# Patient Record
Sex: FEMALE | Race: BLACK OR AFRICAN AMERICAN
[De-identification: names, ages, dates, MRNs, and addresses within clinical notes are randomized per-mention and may not be internally consistent; named-entity substitution may affect disease eponyms.]

---

## 2018-01-19 ENCOUNTER — HOSPITAL ENCOUNTER (OUTPATIENT)
Dept: HOSPITAL 17 - PHED | Age: 73
Setting detail: OBSERVATION
LOS: 3 days | Discharge: HOME HEALTH | End: 2018-01-22
Attending: HOSPITALIST | Admitting: HOSPITALIST
Payer: MEDICARE

## 2018-01-19 VITALS
HEART RATE: 106 BPM | DIASTOLIC BLOOD PRESSURE: 67 MMHG | SYSTOLIC BLOOD PRESSURE: 108 MMHG | TEMPERATURE: 99.7 F | OXYGEN SATURATION: 97 % | RESPIRATION RATE: 20 BRPM

## 2018-01-19 VITALS
HEART RATE: 92 BPM | SYSTOLIC BLOOD PRESSURE: 115 MMHG | OXYGEN SATURATION: 94 % | RESPIRATION RATE: 20 BRPM | DIASTOLIC BLOOD PRESSURE: 67 MMHG

## 2018-01-19 VITALS
SYSTOLIC BLOOD PRESSURE: 147 MMHG | DIASTOLIC BLOOD PRESSURE: 84 MMHG | TEMPERATURE: 99.1 F | RESPIRATION RATE: 16 BRPM | HEART RATE: 100 BPM | OXYGEN SATURATION: 97 %

## 2018-01-19 VITALS
SYSTOLIC BLOOD PRESSURE: 121 MMHG | HEART RATE: 98 BPM | OXYGEN SATURATION: 99 % | DIASTOLIC BLOOD PRESSURE: 78 MMHG | TEMPERATURE: 98.6 F | RESPIRATION RATE: 20 BRPM

## 2018-01-19 VITALS
SYSTOLIC BLOOD PRESSURE: 104 MMHG | DIASTOLIC BLOOD PRESSURE: 60 MMHG | RESPIRATION RATE: 20 BRPM | OXYGEN SATURATION: 98 % | HEART RATE: 109 BPM | TEMPERATURE: 97.8 F

## 2018-01-19 VITALS — OXYGEN SATURATION: 96 %

## 2018-01-19 VITALS
HEART RATE: 102 BPM | RESPIRATION RATE: 20 BRPM | SYSTOLIC BLOOD PRESSURE: 114 MMHG | TEMPERATURE: 99.8 F | OXYGEN SATURATION: 98 % | DIASTOLIC BLOOD PRESSURE: 65 MMHG

## 2018-01-19 VITALS — DIASTOLIC BLOOD PRESSURE: 58 MMHG | SYSTOLIC BLOOD PRESSURE: 106 MMHG

## 2018-01-19 VITALS — HEART RATE: 102 BPM

## 2018-01-19 VITALS — HEIGHT: 64 IN | WEIGHT: 182.1 LBS | BODY MASS INDEX: 31.09 KG/M2

## 2018-01-19 DIAGNOSIS — Z79.899: ICD-10-CM

## 2018-01-19 DIAGNOSIS — R73.9: ICD-10-CM

## 2018-01-19 DIAGNOSIS — E86.0: ICD-10-CM

## 2018-01-19 DIAGNOSIS — I10: ICD-10-CM

## 2018-01-19 DIAGNOSIS — N20.0: ICD-10-CM

## 2018-01-19 DIAGNOSIS — D63.8: ICD-10-CM

## 2018-01-19 DIAGNOSIS — G93.41: ICD-10-CM

## 2018-01-19 DIAGNOSIS — B96.4: ICD-10-CM

## 2018-01-19 DIAGNOSIS — K59.00: ICD-10-CM

## 2018-01-19 DIAGNOSIS — R94.31: ICD-10-CM

## 2018-01-19 DIAGNOSIS — E78.00: ICD-10-CM

## 2018-01-19 DIAGNOSIS — R60.9: ICD-10-CM

## 2018-01-19 DIAGNOSIS — D25.9: ICD-10-CM

## 2018-01-19 DIAGNOSIS — R11.0: ICD-10-CM

## 2018-01-19 DIAGNOSIS — N39.0: Primary | ICD-10-CM

## 2018-01-19 LAB
% SATURATION IRON PROFILE: 11.6 % (ref 20–50)
ALBUMIN SERPL-MCNC: 2.4 GM/DL (ref 3.4–5)
ALP SERPL-CCNC: 82 U/L (ref 45–117)
ALT SERPL-CCNC: 14 U/L (ref 10–53)
APAP SERPL-MCNC: (no result) MCG/ML (ref 10–30)
AST SERPL-CCNC: 24 U/L (ref 15–37)
BACTERIA #/AREA URNS HPF: (no result) /HPF
BASOPHILS # BLD AUTO: 0 TH/MM3 (ref 0–0.2)
BASOPHILS NFR BLD: 0.4 % (ref 0–2)
BILIRUB SERPL-MCNC: 0.4 MG/DL (ref 0.2–1)
BUN SERPL-MCNC: 17 MG/DL (ref 7–18)
CALCIUM SERPL-MCNC: 10.6 MG/DL (ref 8.5–10.1)
CHLORIDE SERPL-SCNC: 106 MEQ/L (ref 98–107)
COLOR UR: YELLOW
CREAT SERPL-MCNC: 0.92 MG/DL (ref 0.5–1)
EOSINOPHIL # BLD: 0.1 TH/MM3 (ref 0–0.4)
EOSINOPHIL NFR BLD: 0.8 % (ref 0–4)
ERYTHROCYTE [DISTWIDTH] IN BLOOD BY AUTOMATED COUNT: 16.2 % (ref 11.6–17.2)
FERRITIN SERPL-MCNC: 703 NG/ML (ref 8–252)
GFR SERPLBLD BASED ON 1.73 SQ M-ARVRAT: 72 ML/MIN (ref 89–?)
GLUCOSE SERPL-MCNC: 147 MG/DL (ref 74–106)
GLUCOSE UR STRIP-MCNC: (no result) MG/DL
HCO3 BLD-SCNC: 28.1 MEQ/L (ref 21–32)
HCT VFR BLD CALC: 28.6 % (ref 35–46)
HGB BLD-MCNC: 8.9 GM/DL (ref 11.6–15.3)
HGB UR QL STRIP: (no result)
IRON (FE): 23 MCG/DL (ref 50–170)
KETONES UR STRIP-MCNC: (no result) MG/DL
LIPASE: 123 U/L (ref 73–393)
LYMPHOCYTES # BLD AUTO: 1 TH/MM3 (ref 1–4.8)
LYMPHOCYTES NFR BLD AUTO: 12.2 % (ref 9–44)
MCH RBC QN AUTO: 23 PG (ref 27–34)
MCHC RBC AUTO-ENTMCNC: 31.1 % (ref 32–36)
MCV RBC AUTO: 74.2 FL (ref 80–100)
MONOCYTE #: 0.6 TH/MM3 (ref 0–0.9)
MONOCYTES NFR BLD: 8.2 % (ref 0–8)
NEUTROPHILS # BLD AUTO: 6.2 TH/MM3 (ref 1.8–7.7)
NEUTROPHILS NFR BLD AUTO: 78.4 % (ref 16–70)
NITRITE UR QL STRIP: (no result)
PLATELET # BLD: 374 TH/MM3 (ref 150–450)
PMV BLD AUTO: 7.2 FL (ref 7–11)
PROT SERPL-MCNC: 7.7 GM/DL (ref 6.4–8.2)
RBC # BLD AUTO: 3.86 MIL/MM3 (ref 4–5.3)
RBC #/AREA URNS HPF: (no result) /HPF (ref 0–3)
SODIUM SERPL-SCNC: 140 MEQ/L (ref 136–145)
SP GR UR STRIP: 1.01 (ref 1–1.03)
SQUAMOUS #/AREA URNS HPF: (no result) /HPF (ref 0–5)
TIBC SERPL-MCNC: 199 MCG/DL (ref 250–450)
TROPONIN I SERPL-MCNC: (no result) NG/ML (ref 0.02–0.05)
URINE LEUKOCYTE ESTERASE: (no result)
WBC # BLD AUTO: 7.9 TH/MM3 (ref 4–11)

## 2018-01-19 PROCEDURE — 84443 ASSAY THYROID STIM HORMONE: CPT

## 2018-01-19 PROCEDURE — 93970 EXTREMITY STUDY: CPT

## 2018-01-19 PROCEDURE — 83690 ASSAY OF LIPASE: CPT

## 2018-01-19 PROCEDURE — 83540 ASSAY OF IRON: CPT

## 2018-01-19 PROCEDURE — 83550 IRON BINDING TEST: CPT

## 2018-01-19 PROCEDURE — 51702 INSERT TEMP BLADDER CATH: CPT

## 2018-01-19 PROCEDURE — 87086 URINE CULTURE/COLONY COUNT: CPT

## 2018-01-19 PROCEDURE — 97110 THERAPEUTIC EXERCISES: CPT

## 2018-01-19 PROCEDURE — 96361 HYDRATE IV INFUSION ADD-ON: CPT

## 2018-01-19 PROCEDURE — G0378 HOSPITAL OBSERVATION PER HR: HCPCS

## 2018-01-19 PROCEDURE — 82948 REAGENT STRIP/BLOOD GLUCOSE: CPT

## 2018-01-19 PROCEDURE — 74177 CT ABD & PELVIS W/CONTRAST: CPT

## 2018-01-19 PROCEDURE — 96376 TX/PRO/DX INJ SAME DRUG ADON: CPT

## 2018-01-19 PROCEDURE — 74018 RADEX ABDOMEN 1 VIEW: CPT

## 2018-01-19 PROCEDURE — 87186 SC STD MICRODIL/AGAR DIL: CPT

## 2018-01-19 PROCEDURE — 80048 BASIC METABOLIC PNL TOTAL CA: CPT

## 2018-01-19 PROCEDURE — 84484 ASSAY OF TROPONIN QUANT: CPT

## 2018-01-19 PROCEDURE — 82550 ASSAY OF CK (CPK): CPT

## 2018-01-19 PROCEDURE — 93005 ELECTROCARDIOGRAM TRACING: CPT

## 2018-01-19 PROCEDURE — 96366 THER/PROPH/DIAG IV INF ADDON: CPT

## 2018-01-19 PROCEDURE — 71046 X-RAY EXAM CHEST 2 VIEWS: CPT

## 2018-01-19 PROCEDURE — 97166 OT EVAL MOD COMPLEX 45 MIN: CPT

## 2018-01-19 PROCEDURE — 99285 EMERGENCY DEPT VISIT HI MDM: CPT

## 2018-01-19 PROCEDURE — 97535 SELF CARE MNGMENT TRAINING: CPT

## 2018-01-19 PROCEDURE — 87077 CULTURE AEROBIC IDENTIFY: CPT

## 2018-01-19 PROCEDURE — 96365 THER/PROPH/DIAG IV INF INIT: CPT

## 2018-01-19 PROCEDURE — 80053 COMPREHEN METABOLIC PANEL: CPT

## 2018-01-19 PROCEDURE — 96367 TX/PROPH/DG ADDL SEQ IV INF: CPT

## 2018-01-19 PROCEDURE — 82552 ASSAY OF CPK IN BLOOD: CPT

## 2018-01-19 PROCEDURE — 96372 THER/PROPH/DIAG INJ SC/IM: CPT

## 2018-01-19 PROCEDURE — 97530 THERAPEUTIC ACTIVITIES: CPT

## 2018-01-19 PROCEDURE — 97162 PT EVAL MOD COMPLEX 30 MIN: CPT

## 2018-01-19 PROCEDURE — 83036 HEMOGLOBIN GLYCOSYLATED A1C: CPT

## 2018-01-19 PROCEDURE — 81001 URINALYSIS AUTO W/SCOPE: CPT

## 2018-01-19 PROCEDURE — 80307 DRUG TEST PRSMV CHEM ANLYZR: CPT

## 2018-01-19 PROCEDURE — 82728 ASSAY OF FERRITIN: CPT

## 2018-01-19 PROCEDURE — 85025 COMPLETE CBC W/AUTO DIFF WBC: CPT

## 2018-01-19 PROCEDURE — 70450 CT HEAD/BRAIN W/O DYE: CPT

## 2018-01-19 RX ADMIN — HEPARIN SODIUM SCH UNITS: 10000 INJECTION, SOLUTION INTRAVENOUS; SUBCUTANEOUS at 20:59

## 2018-01-19 RX ADMIN — INSULIN ASPART SCH: 100 INJECTION, SOLUTION INTRAVENOUS; SUBCUTANEOUS at 21:00

## 2018-01-19 RX ADMIN — INSULIN ASPART SCH: 100 INJECTION, SOLUTION INTRAVENOUS; SUBCUTANEOUS at 17:00

## 2018-01-19 RX ADMIN — PHENYTOIN SODIUM SCH MLS/HR: 50 INJECTION INTRAMUSCULAR; INTRAVENOUS at 04:51

## 2018-01-19 RX ADMIN — PHENYTOIN SODIUM SCH MLS/HR: 50 INJECTION INTRAMUSCULAR; INTRAVENOUS at 05:15

## 2018-01-19 RX ADMIN — Medication SCH ML: at 20:59

## 2018-01-19 RX ADMIN — HEPARIN SODIUM SCH UNITS: 10000 INJECTION, SOLUTION INTRAVENOUS; SUBCUTANEOUS at 14:00

## 2018-01-19 RX ADMIN — STANDARDIZED SENNA CONCENTRATE AND DOCUSATE SODIUM SCH TAB: 8.6; 5 TABLET, FILM COATED ORAL at 20:59

## 2018-01-19 RX ADMIN — Medication SCH ML: at 09:00

## 2018-01-19 RX ADMIN — INSULIN ASPART SCH: 100 INJECTION, SOLUTION INTRAVENOUS; SUBCUTANEOUS at 12:00

## 2018-01-19 NOTE — RADRPT
EXAM DATE/TIME:  01/19/2018 04:09 

 

[facility] COMPARISON:     

No previous studies available for comparison.

 

 

INDICATIONS :     

Altered mental status and generalized weakness

                      

 

RADIATION DOSE:     

57.79 CTDIvol (mGy) 

 

 

 

MEDICAL HISTORY :     

Hypertension.  

 

SURGICAL HISTORY :      

None. 

 

ENCOUNTER:      

Initial

 

ACUITY:      

1 day

 

PAIN SCALE:      

0/10

 

LOCATION:        

cranial 

 

TECHNIQUE:     

Multiple contiguous axial images were obtained of the head.  Using automated exposure control and adj
ustment of the mA and/or kV according to patient size, radiation dose was kept as low as reasonably a
chievable to obtain optimal diagnostic quality images.   DICOM format image data is available electro
nically for review and comparison.  

 

FINDINGS:     

 

CEREBRUM:     

Mild to moderate diffuse volume loss. The ventricles are normal for age.  No evidence of midline shif
t, mass lesion, hemorrhage or acute infarction.  No extra-axial fluid collections are seen.

 

POSTERIOR FOSSA:     

The cerebellum and brainstem are intact.  The 4th ventricle is midline.  The cerebellopontine angle i
s unremarkable.

 

EXTRACRANIAL:     

The visualized portion of the orbits is intact.

 

SKULL:     

The calvaria is intact.  No evidence of skull fracture.

 

CONCLUSION:     

1. Senescent changes without acute intracranial abnormality.

 

 

 

 Mario Del Rosario MD on January 19, 2018 at 4:28           

Board Certified Radiologist.

 This report was verified electronically.

## 2018-01-19 NOTE — PD
HPI


Chief Complaint:  General Weakness


Time Seen by Provider:  03:17


Travel History


International Travel<30 days:  No


Contact w/Intl Traveler<30days:  No


Traveled to known affect area:  No





History of Present Illness


HPI


 The patient is a 72-year-old female that has a history of urinary tract 

infections, specifically pyelonephritis, who complains of dysuria frequency and 

urgency.  She is a poor historian and said she has had this problem since age 

3.  She also states she has flank pain and then she denies flank pain.  She 

does not know if she has had a fever or not.





PFSH


Past Medical History


High Cholesterol:  Yes


Diminished Hearing:  No


Hypertension:  Yes


Menopausal:  Yes


Tubal Ligation:  Yes





Social History


Alcohol Use:  No


Tobacco Use:  No


Substance Use:  No





Allergies-Medications


(Allergen,Severity, Reaction):  


Coded Allergies:  


     No Known Allergies (Unverified  Adverse Reaction, Unknown, 1/19/18)


Reported Meds & Prescriptions





Reported Meds & Active Scripts


Active


Reported


Lisinopril 20 Mg Tab 20 Mg PO DAILY


Norco (Hydrocodone-Acetaminophen) 5 Mg-325 Mg Tab 1 Tab PO Q4H PRN


Atorvastatin (Atorvastatin Calcium) 20 Mg Tab 20 Mg PO HS


Amlodipine (Amlodipine Besylate) 5 Mg Tab 5 Mg PO DAILY








Review of Systems


ROS Limitations:  Poor Historian





Physical Exam


Narrative


GENERAL: Well-nourished, well-developed patient.The patient is somewhat 

lethargic and answers questions slowly.  She does not know what state she is in 

but says she is in [place].  She does know the president.  It took her a long 

time to think of the year but she ultimately got that right.  Her vital signs 

show pulse of 100, blood pressure 147/84 but otherwise normal.  The patient 

appears moderately dehydrated.  She is incontinent of urine.  SHe smells of 

urine.


SKIN: Focused skin assessment warm/dry.  No skin rash is seen.  The patient 

exhibits suboptimal hygiene.


HEAD: Normocephalic.


EYES: No scleral icterus. No injection or drainage. 


NECK: Supple, trachea midline. No JVD or lymphadenopathy.


CARDIOVASCULAR: Regular rate and rhythm without murmurs, gallops, or rubs. 


RESPIRATORY: Breath sounds equal bilaterally. No accessory muscle use.


GASTROINTESTINAL: Abdomen soft, non-tender, nondistended.  No guarding or 

rebound is present.


MUSCULOSKELETAL: No cyanosis, or edema. 


BACK: Nontender without obvious deformity. No CVA tenderness. 


ENT: The tongue appears somewhat dry, tympanic membranes are clear.  The throat 

is clear.


RECTAL EXAM: No masses or tenderness, stool is brown and guaiac-negative.





Data


Data


Last Documented VS





Vital Signs








  Date Time  Temp Pulse Resp B/P (MAP) Pulse Ox O2 Delivery O2 Flow Rate FiO2


 


1/19/18 03:38  87 20  97   


 


1/19/18 02:55      Room Air  


 


1/19/18 02:54 99.1   147/84 (105)    








Orders





 Orders


Complete Blood Count With Diff (1/19/18 02:53)


Comprehensive Metabolic Panel (1/19/18 02:53)


Urinalysis - C+S If Indicated (1/19/18 02:53)


Iv Access Insert/Monitor (1/19/18 02:53)


Oxygen Administration (1/19/18 02:53)


Oximetry (1/19/18 02:53)


Urine Culture (1/19/18 02:55)


Lipase (1/19/18 02:53)


Thyroid Stimulating Hormone (1/19/18 02:53)


Electrocardiogram (1/19/18 03:53)


Chest, Pa & Lat (1/19/18 03:53)


Ct Brain W/O Iv Contrast(Rout) (1/19/18 03:53)


Urinary Catheter Insert/Apply (1/19/18 03:53)


Drug Screen, Random Urine (1/19/18 03:53)


Tylenol (Acetaminophen) (1/19/18 03:20)


Alcohol (Ethanol) (1/19/18 03:20)


Creatine Kinase (Cpk) (1/19/18 03:20)


Troponin I (1/19/18 03:20)


CKMB (1/19/18 03:20)


CKMB% (1/19/18 03:20)


Levofloxacin 750 Mg Premix Inj (Levaquin (1/19/18 04:30)


Sodium Chlor 0.9% 1000 Ml Inj (Ns 1000 M (1/19/18 04:45)





Labs





Laboratory Tests








Test


  1/19/18


02:55 1/19/18


03:20


 


Urine Color YELLOW  


 


Urine Turbidity CLOUDY  


 


Urine pH 8.5  


 


Urine Specific Gravity 1.013  


 


Urine Protein 100 mg/dL  


 


Urine Glucose (UA) NEG mg/dL  


 


Urine Ketones NEG mg/dL  


 


Urine Occult Blood MOD  


 


Urine Nitrite NEG  


 


Urine Bilirubin NEG  


 


Urine Leukocyte Esterase LARGE  


 


Urine RBC 4-9 /hpf  


 


Urine WBC 20-24 /hpf  


 


Urine Squamous Epithelial


Cells 0-5 /hpf 


  


 


 


Urine Bacteria MANY /hpf  


 


Microscopic Urinalysis Comment


  CATH-CULTURE


IND 


 


 


Urine Opiates Screen NEG  


 


Urine Barbiturates Screen NEG  


 


Urine Amphetamines Screen NEG  


 


Urine Benzodiazepines Screen NEG  


 


Urine Cocaine Screen NEG  


 


Urine Cannabinoids Screen NEG  


 


White Blood Count  7.9 TH/MM3 


 


Red Blood Count  3.86 MIL/MM3 


 


Hemoglobin  8.9 GM/DL 


 


Hematocrit  28.6 % 


 


Mean Corpuscular Volume  74.2 FL 


 


Mean Corpuscular Hemoglobin  23.0 PG 


 


Mean Corpuscular Hemoglobin


Concent 


  31.1 % 


 


 


Red Cell Distribution Width  16.2 % 


 


Platelet Count  374 TH/MM3 


 


Mean Platelet Volume  7.2 FL 


 


Neutrophils (%) (Auto)  78.4 % 


 


Lymphocytes (%) (Auto)  12.2 % 


 


Monocytes (%) (Auto)  8.2 % 


 


Eosinophils (%) (Auto)  0.8 % 


 


Basophils (%) (Auto)  0.4 % 


 


Neutrophils # (Auto)  6.2 TH/MM3 


 


Lymphocytes # (Auto)  1.0 TH/MM3 


 


Monocytes # (Auto)  0.6 TH/MM3 


 


Eosinophils # (Auto)  0.1 TH/MM3 


 


Basophils # (Auto)  0.0 TH/MM3 


 


CBC Comment  AUTO DIFF 


 


Differential Comment


  


  AUTO DIFF


CONFIRMED


 


Platelet Estimate  NORMAL 


 


Platelet Morphology Comment  NORMAL 


 


Blood Urea Nitrogen  17 MG/DL 


 


Creatinine  0.92 MG/DL 


 


Random Glucose  147 MG/DL 


 


Total Protein  7.7 GM/DL 


 


Albumin  2.4 GM/DL 


 


Calcium Level  10.6 MG/DL 


 


Alkaline Phosphatase  82 U/L 


 


Aspartate Amino Transf


(AST/SGOT) 


  24 U/L 


 


 


Alanine Aminotransferase


(ALT/SGPT) 


  14 U/L 


 


 


Total Bilirubin  0.4 MG/DL 


 


Sodium Level  140 MEQ/L 


 


Potassium Level  3.8 MEQ/L 


 


Chloride Level  106 MEQ/L 


 


Carbon Dioxide Level  28.1 MEQ/L 


 


Anion Gap  6 MEQ/L 


 


Estimat Glomerular Filtration


Rate 


  72 ML/MIN 


 


 


Total Creatine Kinase  470 U/L 


 


Creatine Kinase MB  2.9 NG/ML 


 


Creatine Kinase MB %  0.6 % 


 


Troponin I


  


  LESS THAN 0.02


NG/ML


 


Lipase  123 U/L 


 


Thyroid Stimulating Hormone


3rd Gen 


  0.964 uIU/ML 


 


 


Ethyl Alcohol Level


  


  LESS THAN 3


MG/DL











MDM


Medical Decision Making


Medical Screen Exam Complete:  Yes


Emergency Medical Condition:  Yes


Medical Record Reviewed:  Yes


Interpretation(s)


EKG shows sinus rhythm with a rate of 90 and no acute ST elevation or 

depression.  The CT scanning of the brain shows senescent changes without acute 

intercranial abnormality.  The complete metabolic profile shows a glucose of 147

, it GFR of 72, calcium 10.6 and albumin of 2.4 but is otherwise unremarkable.  

The TSH is normal and the troponin I is normal.


Differential Diagnosis


Urinary tract infection-pyelonephritis versus cystitis, nephrolithiasis, 

electrolyte disorder, cholecystitis, appendicitis, colitis, acute coronary 

syndrome, dehydration, altered mental status, anemia, electrolyte disorder, hypo

-/hyperglycemia


Narrative Course


The patient has altered mental status.  This may be due to your her urinary 

tract infection.  She also has mild dehydration and anemia.  A rectal exam was 

done which showed no blood in the stool however.  The patient is too weak and 

somewhat confused and will be admitted to Dr. Steven vivas.





Sepsis Criteria


SIRS Criteria (2 or more):  Heart rate over 90


Sepsis Criteria (SIRS+source):  Infect source susp/known





Diagnosis





 Primary Impression:  


 Altered mental status


 Additional Impressions:  


 Urinary tract infection


 Mild dehydration


 Anemia





Admitting Information


Admitting Physician Requests:  Admit











Nir Hudson MD Jan 19, 2018 03:31

## 2018-01-19 NOTE — RADRPT
EXAM DATE/TIME:  01/19/2018 16:36 

 

[facility] COMPARISON:     

CHEST PA & LAT, January 19, 2018, 4:17.

 

                     

INDICATIONS :     

Abdominal distention. 

                     

 

MEDICAL HISTORY :            

Hypercholesterolemia. Hypertension. Pylonephritis. Syncope. Chronic UTIs, Back pain   

 

SURGICAL HISTORY :     

Tubal ligation.   

 

ENCOUNTER:     

Initial                                        

 

ACUITY:     

1 day      

 

PAIN SCORE:     

2/10

 

LOCATION:     

Bilateral  abdomen

 

FINDINGS:     

The examination demonstrates a staghorn calculus filling the collecting system of the right kidney.

 

There is some residual oral contrast within small bowel versus a heterogeneously calcified mass in th
e lower and mid abdomen. If the patient has not had prior oral contrast CT imaging through the abdome
n is warranted for further assessment.

 

There is mild gaseous distention of the stomach. There is gas and stool down to the rectum. No findin
gs to indicate bowel obstruction are seen.

 

CONCLUSION:     

1. Staghorn calculus filling the right kidney.

2. Possible calcified mass in the midabdomen and pelvis please see above discussion. 

 

 

 Antonio Santos MD on January 19, 2018 at 17:00           

Board Certified Radiologist.

 This report was verified electronically.

## 2018-01-19 NOTE — HHI.HP
__________________________________________________





HPI


Service


[facility]


Primary Care Physician


Juancho Rosales, DO


Admission Diagnosis





altered mental status, urinary tract infection, mild dehydration, an


Diagnoses:  


Chief Complaint:  


altered Mental status


Travel History


International Travel<30 Days:  No


Contact w/Intl Traveler <30 Da:  No


Traveled to Known Affected Are:  No


History of Present Illness


This is a 71-year-old female with past medical history of hypertension, 

hyperlipidemia, previous pyelonephritis who was brought in because of complaint 

of dysuria and urgency.  The patient is a very poor historian but she states 

she has had this problem since age 3.  The patient denies any chest pain, 

shortness of breath, states she has not had fevers but felt warm at home but 

denies chills.  As per ED medical records the patient was lethargic when 

examined initially.  The patient also complains of bilateral lower extremity 

swelling however she states that her right lower extremity is more swollen than 

the left.  She has a state that she has been in bed for the past 2 days with 

generalized weakness unable to ambulate.





Review of Systems


As per history of present illness, other systems reviewed and negative.





Past Family Social History


Past Medical History


Hypertension, hyperlipidemia, pyelonephritis.


Past Surgical History


Tubal ligation


Reported Medications





Reported Meds & Active Scripts


Active


Reported


Lisinopril 20 Mg Tab 20 Mg PO DAILY


Norco (Hydrocodone-Acetaminophen) 5 Mg-325 Mg Tab 1 Tab PO Q4H PRN


Atorvastatin (Atorvastatin Calcium) 20 Mg Tab 20 Mg PO HS


Amlodipine (Amlodipine Besylate) 5 Mg Tab 5 Mg PO DAILY


Allergies:  


Coded Allergies:  


     No Known Allergies (Unverified  Allergy, Unknown, 18)


Active Ordered Medications





Current Medications








 Medications


  (Trade)  Dose


 Ordered  Sig/[place]


 Route  Start Time


 Stop Time Status Last Admin


 


  (NS Flush)  2 ml  UNSCH  PRN


 IV FLUSH  18 05:15


     


 


 


  (NS Flush)  2 ml  BID


 IV FLUSH  18 09:00


     


 


 


  (Narcan Inj)  0.4 mg  UNSCH  PRN


 IV PUSH  18 05:15


     


 


 


 Ceftriaxone


 Sodium 1000 mg/


 Sodium Chloride  100 ml @ 


 200 mls/hr  Q24H


 IV  18 09:00


    18 09:32


 








Family History


Unable to obtain due to patient's mental status.


Social History


Patient denies smoking, drinking alcohol, use of illicit drugs.





Physical Exam


Vital Signs





Vital Signs








  Date Time  Temp Pulse Resp B/P (MAP) Pulse Ox O2 Delivery O2 Flow Rate FiO2


 


18 08:00 98.6 98 20 121/78 (92) 99   


 


18 06:40  86 20 106/58 (74) 96   


 


18 03:57  92 20 115/67 (83) 94 Nasal Cannula 2.00 


 


18 03:38  87 20  97   


 


18 02:55     96 Room Air  


 


18 02:55     96 Room Air  


 


18 02:54 99.1 100 16 147/84 (105) 97   








Physical Exam


GENERAL: This is a well-nourished, well-developed patient, in no apparent 

distress.


SKIN: No rashes, ecchymoses or lesions. Cool and dry.


HEAD: Atraumatic. Normocephalic. No temporal or scalp tenderness.


EYES: Pupils equal round and reactive. Extraocular motions intact. No scleral 

icterus. No injection or drainage. 


ENT: Nose without bleeding, purulent drainage or septal hematoma. Throat 

without erythema, tonsillar hypertrophy or exudate. Uvula midline. Airway 

patent.


NECK: Trachea midline. No JVD or lymphadenopathy. Supple, nontender, no 

meningeal signs.


CARDIOVASCULAR: Regular rate and rhythm without murmurs, gallops, or rubs. 


RESPIRATORY: Clear to auscultation. Breath sounds equal bilaterally. No wheezes

, rales, or rhonchi.  


GASTROINTESTINAL: Abdomen soft, tender to palpation of suprapubic region, 

nondistended. No hepato-splenomegaly, or palpable masses. No guarding.  Right 

CVA tenderness present.


MUSCULOSKELETAL: Extremities without clubbing, cyanosis, or edema. No joint 

tenderness, effusion, or edema noted. No calf tenderness. Negative Homans sign 

bilaterally.


NEUROLOGICAL: Awake and alert oriented 3, however slow answering questions. 

Cranial nerves II through XII intact.  Motor and sensory grossly within normal 

limits.  3 out of 5 muscle strength in all muscle groups.  Normal speech.


Laboratory





Laboratory Tests








Test


  18


02:55 18


03:20


 


Urine Color YELLOW  


 


Urine Turbidity CLOUDY  


 


Urine pH 8.5  


 


Urine Specific Gravity 1.013  


 


Urine Protein 100  


 


Urine Glucose (UA) NEG  


 


Urine Ketones NEG  


 


Urine Occult Blood MOD  


 


Urine Nitrite NEG  


 


Urine Bilirubin NEG  


 


Urine Leukocyte Esterase LARGE  


 


Urine RBC 4-9  


 


Urine WBC 20-24  


 


Urine Squamous Epithelial


Cells 0-5 


  


 


 


Urine Bacteria MANY  


 


Microscopic Urinalysis Comment


  CATH-CULTURE


IND 


 


 


Urine Opiates Screen NEG  


 


Urine Barbiturates Screen NEG  


 


Urine Amphetamines Screen NEG  


 


Urine Benzodiazepines Screen NEG  


 


Urine Cocaine Screen NEG  


 


Urine Cannabinoids Screen NEG  


 


White Blood Count  7.9 


 


Red Blood Count  3.86 


 


Hemoglobin  8.9 


 


Hematocrit  28.6 


 


Mean Corpuscular Volume  74.2 


 


Mean Corpuscular Hemoglobin  23.0 


 


Mean Corpuscular Hemoglobin


Concent 


  31.1 


 


 


Red Cell Distribution Width  16.2 


 


Platelet Count  374 


 


Mean Platelet Volume  7.2 


 


Neutrophils (%) (Auto)  78.4 


 


Lymphocytes (%) (Auto)  12.2 


 


Monocytes (%) (Auto)  8.2 


 


Eosinophils (%) (Auto)  0.8 


 


Basophils (%) (Auto)  0.4 


 


Neutrophils # (Auto)  6.2 


 


Lymphocytes # (Auto)  1.0 


 


Monocytes # (Auto)  0.6 


 


Eosinophils # (Auto)  0.1 


 


Basophils # (Auto)  0.0 


 


CBC Comment  AUTO DIFF 


 


Differential Comment


  


  AUTO DIFF


CONFIRMED


 


Platelet Estimate  NORMAL 


 


Platelet Morphology Comment  NORMAL 


 


Blood Urea Nitrogen  17 


 


Creatinine  0.92 


 


Random Glucose  147 


 


Total Protein  7.7 


 


Albumin  2.4 


 


Calcium Level  10.6 


 


Alkaline Phosphatase  82 


 


Aspartate Amino Transf


(AST/SGOT) 


  24 


 


 


Alanine Aminotransferase


(ALT/SGPT) 


  14 


 


 


Total Bilirubin  0.4 


 


Sodium Level  140 


 


Potassium Level  3.8 


 


Chloride Level  106 


 


Carbon Dioxide Level  28.1 


 


Anion Gap  6 


 


Estimat Glomerular Filtration


Rate 


  72 


 


 


Total Creatine Kinase  470 


 


Creatine Kinase MB  2.9 


 


Creatine Kinase MB %  0.6 


 


Troponin I  LESS THAN 0.02 


 


Lipase  123 


 


Thyroid Stimulating Hormone


3rd Gen 


  0.964 


 


 


Acetaminophen Level  LESS THAN 2.0 


 


Ethyl Alcohol Level  LESS THAN 3 














 Date/Time


Source Procedure


Growth Status


 


 


 18 02:55


Urine Catheterized Urine Urine Culture


Pending Received








Result Diagram:  


180                                                                   

             18





Imaging





Last Impressions








Head CT 18 Signed





Impressions: 





 Service Date/Time:  2018 04:09 - CONCLUSION:  1. Senescent 





 changes without acute intracranial abnormality.     Mario Del Rosario MD 


 


Chest X-Ray 18 Signed





Impressions: 





 Service Date/Time:  2018 04:17 - CONCLUSION:  1. No acute 





 cardiopulmonary disease.     Mario Del Rosario MD 








Reviewed by me Caprini VTE Risk Assessment


Caprini VTE Risk Assessment:  Mod/High Risk (score >= 2)


Caprini Risk Assessment Model











 Point Value = 1          Point Value = 2  Point Value = 3  Point Value = 5


 


Age 41-60


Minor surgery


BMI > 25 kg/m2


Swollen legs


Varicose veins


Pregnancy or postpartum


History of unexplained or recurrent


   spontaneous 


Oral contraceptives or hormone


   replacement


Sepsis (< 1 month)


Serious lung disease, including


   pneumonia (< 1 month)


Abnormal pulmonary function


Acute myocardial infarction


Congestive heart failure (< 1 month)


History of inflammatory bowel disease


Medical patient at bed rest Age 61-74


Arthroscopic surgery


Major open surgery (> 45 min)


Laparoscopic surgery (> 45 min)


Malignancy


Confined to bed (> 72 hours)


Immobilizing plaster cast


Central venous access Age >= 75


History of VTE


Family history of VTE


Factor V Leiden


Prothrombin 12259T


Lupus anticoagulant


Anticardiolipin antibodies


Elevated serum homocysteine


Heparin-induced thrombocytopenia


Other congenital or acquired


   thrombophilia Stroke (< 1 month)


Elective arthroplasty


Hip, pelvis, or leg fracture


Acute spinal cord injury (< 1 month)








Prophylaxis Regimen











   Total Risk


Factor Score Risk Level Prophylaxis Regimen


 


0-1      Low Early ambulation


 


2 Moderate Order ONE of the following:


*Sequential Compression Device (SCD)


*Heparin 5000 units SQ BID


 


3-4 Higher Order ONE of the following medications:


*Heparin 5000 units SQ TID


*Enoxaparin/Lovenox 40 mg SQ daily (WT < 150 kg, CrCl > 30 mL/min)


*Enoxaparin/Lovenox 30 mg SQ daily (WT < 150 kg, CrCl > 10-29 mL/min)


*Enoxaparin/Lovenox 30 mg SQ BID (WT < 150 kg, CrCl > 30 mL/min)


AND/OR


*Sequential Compression Device (SCD)


 


5 or more Highest Order ONE of the following medications:


*Heparin 5000 units SQ TID (Preferred with Epidurals)


*Enoxaparin/Lovenox 40 mg SQ daily (WT < 150 kg, CrCl > 30 mL/min)


*Enoxaparin/Lovenox 30 mg SQ daily (WT < 150 kg, CrCl > 10-29 mL/min)


*Enoxaparin/Lovenox 30 mg SQ BID (WT < 150 kg, CrCl > 30 mL/min)


AND


*Sequential Compression Device (SCD)











Assessment and Plan


Problem List:  


(1) Encephalopathy acute


ICD Code:  G93.40 - Encephalopathy, unspecified


Plan:  Patient presented lethargic and with altered mental status.


Change in mental status likely secondary to metabolic encephalopathy secondary 

to urinary tract infection.


Urine analysis positive, urine culture pending.


Start IV Rocephin in the emergency department.  I will continue.





(2) Urinary tract infection


ICD Code:  N39.0 - Urinary tract infection, site not specified


Status:  Acute


Plan:  Patient has had.  His episodes of a urinary tract infection.


Continue Rocephin.





(3) Anemia


ICD Code:  D64.9 - Anemia, unspecified


Status:  Acute


Plan:  Check iron studies


Monitor hemoglobin


Check stool guaiac.





(4) Hyperglycemia


ICD Code:  R73.9 - Hyperglycemia, unspecified


Plan:  No reported history of diabetes mellitus.


At the secondary to stress and infection.


Place on SSI with insulin NovoLog and check hemoglobin A1c.





(5) Edema


ICD Code:  R60.9 - Edema, unspecified


Plan:  Edema bilateral lower extremities.


Check venous Doppler to rule out DVT since patient has been in bed for the past 

couple days.





Assessment and Plan


DVT prophylaxis: SCDs, heparin subcutaneously.


Discussed Condition With


Patient, RN.





Problem Qualifiers





(1) Anemia:  


Qualified Codes:  D64.9 - Anemia, unspecified








Norberto Maza MD 2018 10:22

## 2018-01-19 NOTE — EKG
Date Performed: 01/19/2018       Time Performed: 03:59:09

 

PTAGE:      72 years

 

EKG:      Sinus rhythm 

 

. Lead V3 is not available. NONSPECIFIC T-WAVE ABNORMALITY BORDERLINE ECG WARNING: DATA QUALITY MAY A
FFECT INTERPRETATION 

 

NO PREVIOUS TRACING            

 

DOCTOR:   Mike Pike  Interpretating Date/Time  01/19/2018 14:00:16

## 2018-01-19 NOTE — RADRPT
EXAM DATE/TIME:  01/19/2018 11:52 

 

[facility] COMPARISON:     

No previous studies available for comparison.

        

 

 

INDICATIONS :                

Bilateral leg swelling.

            

 

MEDICAL HISTORY :     

Hypercholesterolemia. Hypertension.  Pylonephritis. Syncope. Chronic UTIs. Back pain.

 

SURGICAL HISTORY :     

Tubal ligation. 

 

ENCOUNTER:     

Initial

 

ACUITY:     

2 day

 

PAIN SCORE:      

2/10

 

LOCATION:      

Bilateral  legs.

                       

 

TECHNIQUE:     

Venous ultrasound of the left and right leg was performed from the inguinal ligament to the proximal 
calf.  Real-time, color Doppler and spectral tracing, compression and augmentation techniques were us
ed.  

 

FINDINGS:     

 

RIGHT LEG:     

There is normal compressibility of the deep venous system from the inguinal region to the proximal ca
lf.  No echogenic clot is seen in the lumen of the common femoral, femoral, popliteal, and posterior 
tibial veins.  There is a normal response of the venous system to proximal and distal augmentation an
d respiration.  

 

LEFT LEG:     

There is normal compressibility of the deep venous system from the inguinal region to the proximal ca
lf.  No echogenic clot is seen in the lumen of the common femoral, femoral, popliteal, and posterior 
tibial veins.  There is a normal response of the venous system to proximal and distal augmentation an
d respiration.  

 

CONCLUSION:     

Negative exam with no evidence of deep venous thrombosis.

 

 

 

 Kamar Zimmeramn MD on January 19, 2018 at 12:49           

Board Certified Radiologist.

 This report was verified electronically.

## 2018-01-19 NOTE — RADRPT
EXAM DATE/TIME:  01/19/2018 04:17 

 

[facility] COMPARISON:     

No previous studies available for comparison.

 

                     

INDICATIONS :     

Syncopal episode today

                     

 

MEDICAL HISTORY :     

Hypertension.          

 

SURGICAL HISTORY :     

None.   

 

ENCOUNTER:     

Initial                                        

 

ACUITY:     

1 day      

 

PAIN SCORE:     

Non-responsive.

 

LOCATION:     

Bilateral chest 

 

FINDINGS:     

PA and wheelchair lateral views of the chest demonstrate the lungs to be symmetrically aerated withou
t evidence of mass, infiltrate or effusion.  The cardiomediastinal contours are unremarkable.  Osseou
s structures are intact.

 

CONCLUSION:     

1. No acute cardiopulmonary disease.

 

 

 

 Mario Del Rosario MD on January 19, 2018 at 5:09           

Board Certified Radiologist.

 This report was verified electronically.

## 2018-01-20 VITALS
HEART RATE: 99 BPM | SYSTOLIC BLOOD PRESSURE: 115 MMHG | DIASTOLIC BLOOD PRESSURE: 70 MMHG | TEMPERATURE: 98.6 F | OXYGEN SATURATION: 96 % | RESPIRATION RATE: 20 BRPM

## 2018-01-20 VITALS
TEMPERATURE: 99 F | SYSTOLIC BLOOD PRESSURE: 130 MMHG | DIASTOLIC BLOOD PRESSURE: 76 MMHG | RESPIRATION RATE: 18 BRPM | HEART RATE: 96 BPM | OXYGEN SATURATION: 100 %

## 2018-01-20 VITALS
TEMPERATURE: 99 F | SYSTOLIC BLOOD PRESSURE: 115 MMHG | OXYGEN SATURATION: 98 % | RESPIRATION RATE: 20 BRPM | HEART RATE: 94 BPM | DIASTOLIC BLOOD PRESSURE: 62 MMHG

## 2018-01-20 VITALS
DIASTOLIC BLOOD PRESSURE: 72 MMHG | HEART RATE: 88 BPM | RESPIRATION RATE: 20 BRPM | TEMPERATURE: 99.2 F | OXYGEN SATURATION: 97 % | SYSTOLIC BLOOD PRESSURE: 106 MMHG

## 2018-01-20 VITALS
RESPIRATION RATE: 20 BRPM | TEMPERATURE: 99.5 F | SYSTOLIC BLOOD PRESSURE: 124 MMHG | DIASTOLIC BLOOD PRESSURE: 63 MMHG | OXYGEN SATURATION: 97 % | HEART RATE: 90 BPM

## 2018-01-20 VITALS
OXYGEN SATURATION: 98 % | SYSTOLIC BLOOD PRESSURE: 102 MMHG | HEART RATE: 91 BPM | RESPIRATION RATE: 20 BRPM | TEMPERATURE: 97.4 F | DIASTOLIC BLOOD PRESSURE: 58 MMHG

## 2018-01-20 LAB
BASOPHILS # BLD AUTO: 0 TH/MM3 (ref 0–0.2)
BASOPHILS NFR BLD: 0.1 % (ref 0–2)
BUN SERPL-MCNC: 15 MG/DL (ref 7–18)
CALCIUM SERPL-MCNC: 9.9 MG/DL (ref 8.5–10.1)
CHLORIDE SERPL-SCNC: 107 MEQ/L (ref 98–107)
CREAT SERPL-MCNC: 0.93 MG/DL (ref 0.5–1)
EOSINOPHIL # BLD: 0.1 TH/MM3 (ref 0–0.4)
EOSINOPHIL NFR BLD: 2.3 % (ref 0–4)
ERYTHROCYTE [DISTWIDTH] IN BLOOD BY AUTOMATED COUNT: 16.7 % (ref 11.6–17.2)
GFR SERPLBLD BASED ON 1.73 SQ M-ARVRAT: 72 ML/MIN (ref 89–?)
GLUCOSE SERPL-MCNC: 137 MG/DL (ref 74–106)
HCO3 BLD-SCNC: 26.8 MEQ/L (ref 21–32)
HCT VFR BLD CALC: 27 % (ref 35–46)
HGB BLD-MCNC: 8.2 GM/DL (ref 11.6–15.3)
LYMPHOCYTES # BLD AUTO: 1.5 TH/MM3 (ref 1–4.8)
LYMPHOCYTES NFR BLD AUTO: 23.3 % (ref 9–44)
MCH RBC QN AUTO: 22.9 PG (ref 27–34)
MCHC RBC AUTO-ENTMCNC: 30.5 % (ref 32–36)
MCV RBC AUTO: 75.1 FL (ref 80–100)
MONOCYTE #: 0.6 TH/MM3 (ref 0–0.9)
MONOCYTES NFR BLD: 9.6 % (ref 0–8)
NEUTROPHILS # BLD AUTO: 4.1 TH/MM3 (ref 1.8–7.7)
NEUTROPHILS NFR BLD AUTO: 64.7 % (ref 16–70)
PLATELET # BLD: 339 TH/MM3 (ref 150–450)
PMV BLD AUTO: 7.6 FL (ref 7–11)
RBC # BLD AUTO: 3.6 MIL/MM3 (ref 4–5.3)
SODIUM SERPL-SCNC: 141 MEQ/L (ref 136–145)
WBC # BLD AUTO: 6.3 TH/MM3 (ref 4–11)

## 2018-01-20 RX ADMIN — INSULIN ASPART SCH: 100 INJECTION, SOLUTION INTRAVENOUS; SUBCUTANEOUS at 20:45

## 2018-01-20 RX ADMIN — Medication SCH ML: at 20:51

## 2018-01-20 RX ADMIN — HYDROCODONE BITARTRATE AND ACETAMINOPHEN PRN TAB: 5; 325 TABLET ORAL at 20:50

## 2018-01-20 RX ADMIN — INSULIN ASPART SCH: 100 INJECTION, SOLUTION INTRAVENOUS; SUBCUTANEOUS at 12:08

## 2018-01-20 RX ADMIN — HYDROCODONE BITARTRATE AND ACETAMINOPHEN PRN TAB: 5; 325 TABLET ORAL at 13:11

## 2018-01-20 RX ADMIN — INSULIN ASPART SCH: 100 INJECTION, SOLUTION INTRAVENOUS; SUBCUTANEOUS at 08:48

## 2018-01-20 RX ADMIN — STANDARDIZED SENNA CONCENTRATE AND DOCUSATE SODIUM SCH TAB: 8.6; 5 TABLET, FILM COATED ORAL at 20:48

## 2018-01-20 RX ADMIN — Medication SCH ML: at 07:40

## 2018-01-20 RX ADMIN — HEPARIN SODIUM SCH UNITS: 10000 INJECTION, SOLUTION INTRAVENOUS; SUBCUTANEOUS at 13:08

## 2018-01-20 RX ADMIN — HEPARIN SODIUM SCH UNITS: 10000 INJECTION, SOLUTION INTRAVENOUS; SUBCUTANEOUS at 04:53

## 2018-01-20 RX ADMIN — INSULIN ASPART SCH: 100 INJECTION, SOLUTION INTRAVENOUS; SUBCUTANEOUS at 17:25

## 2018-01-20 RX ADMIN — STANDARDIZED SENNA CONCENTRATE AND DOCUSATE SODIUM SCH TAB: 8.6; 5 TABLET, FILM COATED ORAL at 07:39

## 2018-01-20 RX ADMIN — HEPARIN SODIUM SCH UNITS: 10000 INJECTION, SOLUTION INTRAVENOUS; SUBCUTANEOUS at 20:48

## 2018-01-20 NOTE — RADRPT
EXAM DATE/TIME:  01/20/2018 15:46 

 

[facility] COMPARISON:     

No previous studies available for comparison.

 

 

INDICATIONS :     

Dysuria, UTI, dehydration, evaluate for a mass.Abdominal distention.

                     

 

IV CONTRAST:     

100 cc Omnipaque 350 (iohexol) IV 

 

 

ORAL CONTRAST:      

Partial prescribed oral contrast ingested.

                     

 

RADIATION DOSE:     

18.53 CTDIvol (mGy) 

 

 

MEDICAL HISTORY :     

Cardiovascular disease. Hypertension. Hypercholesterolemia.Hyperlipedema, syncope, pylonephritis

 

SURGICAL HISTORY :      

Tubal ligation. 

 

ENCOUNTER:      

Initial

 

ACUITY:      

3 days

 

PAIN SCALE:      

0/10

 

LOCATION:         

abdominal.

 

TECHNIQUE:     

Volumetric scanning of the abdomen and pelvis was performed.  Using automated exposure control and ad
justment of the mA and/or kV according to patient size, radiation dose was kept as low as reasonably 
achievable to obtain optimal diagnostic quality images.  DICOM format image data is available electro
nically for review and comparison.  

 

FINDINGS:     

 

LOWER LUNGS:     

The visualized lower lungs are clear.

 

LIVER:     

Homogeneous density without lesion.  There is no dilation of the biliary tree.  No calcified gallston
es. Numerous subcentimeter low-density lesions.

 

SPLEEN:     

Normal size without lesion.

 

PANCREAS:     

Within normal limits.

 

KIDNEYS:     

Normal in size and shape.  There is no mass, stone or hydronephrosis. A large right-sided staghorn ca
lculus measuring 5.9 x 3.5 cm. Several punctate left-sided renal calculi. Numerous low-density lesion
s within the both kidneys likely cysts. Indeterminate intermediate density lesion left mid kidney armani
sures 4.4 x 4.1 cm. Similar lesion upper pole right kidney measures 10.9 cm.

 

ADRENAL GLANDS:     

Within normal limits.

 

VASCULAR:     

There is no aortic aneurysm.

 

BOWEL/MESENTERY:     

The stomach, small bowel, and colon demonstrate no acute abnormality.  There is no free intraperitone
al air or fluid.

 

ABDOMINAL WALL:     

Within normal limits.

 

RETROPERITONEUM:     

There is no lymphadenopathy. 

 

BLADDER:     

No wall thickening or mass. 

 

REPRODUCTIVE:     

Multiple large uterine leiomyomas some of which are exophytic in location extending superiorly into t
he lower abdomen.

 

INGUINAL:     

There is no lymphadenopathy or hernia. 

 

MUSCULOSKELETAL:     

Within normal limits for patient age. 

 

CONCLUSION:     

1. Large staghorn calculus right kidney.

2. Numerous low-density lesions both kidneys likely cysts.

3. Intermediate attenuation lesion left mid kidney and right upper pole measuring 2.9 cm is indetermi
kezia measuring 4.4 cm. Solid lesions cannot be excluded. Nonemergent outpatient contrasted MRI is rec
ommended.

4. Numerous large masses projecting from the uterus into the lower abdomen likely calcified leiomyoma
s.

 

 

 

 Urban Hdez MD on January 20, 2018 at 15:56           

Board Certified Radiologist.

 This report was verified electronically.

## 2018-01-20 NOTE — HHI.PR
Subjective


Remarks


Patient seen today in follow-up for urinary tract infection and for weakness.  

Patient will benefit from rehabilitation at discharge.  Care plan discussed 

with nursing team, family and case management.  Patient's at baseline mentally 

per family.





Objective


Vitals





Vital Signs








  Date Time  Temp Pulse Resp B/P (MAP) Pulse Ox O2 Delivery O2 Flow Rate FiO2


 


1/20/18 08:45   18     


 


1/20/18 08:00 99.2 88 20 106/72 (83) 97   


 


1/20/18 04:00 99.0 94 20 115/62 (79) 98   


 


1/20/18 00:00 97.4 91 20 102/58 (73) 98   


 


1/19/18 20:30  102      


 


1/19/18 20:00 99.8 102 20 114/65 (81) 98   


 


1/19/18 15:58 99.7 106 20 108/67 (81) 97   


 


1/19/18 12:00 97.8 109 20 104/60 (75) 98   














I/O      


 


 1/19/18 1/19/18 1/19/18 1/20/18 1/20/18 1/20/18





 07:00 15:00 23:00 07:00 15:00 23:00


 


Intake Total  1520 ml 100 ml 1720 ml  


 


Output Total  825 ml    


 


Balance  695 ml 100 ml 1720 ml  


 


      


 


Intake Oral  1520 ml  720 ml  


 


IV Total   100 ml 1000 ml  


 


Output Urine Total  825 ml    


 


# Voids    5  


 


# Bowel Movements   1   








Result Diagram:  


1/20/18 0615                                                                   

             1/20/18 0615





Imaging





Last Impressions








Head CT 1/19/18 0353 Signed





Impressions: 





 Service Date/Time:  Friday, January 19, 2018 04:09 - CONCLUSION:  1. Senescent 





 changes without acute intracranial abnormality.     Mario Del Rosario MD 


 


Chest X-Ray 1/19/18 0353 Signed





Impressions: 





 Service Date/Time:  Friday, January 19, 2018 04:17 - CONCLUSION:  1. No acute 





 cardiopulmonary disease.     Mario Del Rosario MD 


 


Lower Extremity Ultrasound 1/19/18 0000 Signed





Impressions: 





 Service Date/Time:  Friday, January 19, 2018 11:52 - CONCLUSION:  Negative 

exam 





 with no evidence of deep venous thrombosis.     Kamar Zimmerman MD 


 


Abdomen X-Ray 1/19/18 0000 Signed





Impressions: 





 Service Date/Time:  Friday, January 19, 2018 16:36 - CONCLUSION:  1. Staghorn 





 calculus filling the right kidney. 2. Possible calcified mass in the 

midabdomen 





 and pelvis please see above discussion.     Antonio Santos MD 








Objective Remarks


GENERAL: This is a well-nourished, well-developed patient, in no apparent 

distress.


CARDIOVASCULAR: Regular rate and rhythm without murmurs, gallops, or rubs. 


RESPIRATORY: Clear to auscultation. Breath sounds equal bilaterally. No wheezes

, rales, or rhonchi.  


GASTROINTESTINAL: Abdomen soft, non-tender, nondistended. Normal active bowel 

sounds


MUSCULOSKELETAL: Extremities without clubbing, cyanosis, there is +2 edema


NEURO:  Burn on the left leg resolved Alert & Oriented x4 to person, place, time

, situation.  3 out of 5 muscle strength bilaterally





A/P


Problem List:  


(1) Encephalopathy acute


ICD Code:  G93.40 - Encephalopathy, unspecified


Plan:  Resolved, patient at baseline at this time as per family in the room.  

She would like to consider rehabilitation.





(2) Urinary tract infection


ICD Code:  N39.0 - Urinary tract infection, site not specified


Status:  Acute


Plan:  Cultures pending, continue empiric Rocephin





(3) Anemia


ICD Code:  D64.9 - Anemia, unspecified


Status:  Acute


Plan:  Anemia of chronic disease


Abdominal images warranted further imaging of the abdomen and pelvis his 

abdomen has been more distended than usual


CT abdomen pelvis with contrast pending





(4) Hyperglycemia


ICD Code:  R73.9 - Hyperglycemia, unspecified


Plan:  I will continue to follow on sliding scale with insulin, hemoglobin A1c 

pending





(5) Edema


ICD Code:  R60.9 - Edema, unspecified


Plan:  Negative ultrasound for DVT





Discharge Planning


Likely discharge in a.m. to rehabilitation pending rehabilitation eval and CT 

abdomen pelvis





Problem Qualifiers





(1) Anemia:  


Qualified Codes:  D64.9 - Anemia, unspecified








Bren Livingston MD Jan 20, 2018 11:40

## 2018-01-21 VITALS
SYSTOLIC BLOOD PRESSURE: 133 MMHG | TEMPERATURE: 100 F | HEART RATE: 93 BPM | DIASTOLIC BLOOD PRESSURE: 85 MMHG | OXYGEN SATURATION: 97 % | RESPIRATION RATE: 18 BRPM

## 2018-01-21 VITALS
DIASTOLIC BLOOD PRESSURE: 60 MMHG | TEMPERATURE: 97.4 F | HEART RATE: 80 BPM | SYSTOLIC BLOOD PRESSURE: 114 MMHG | OXYGEN SATURATION: 97 % | RESPIRATION RATE: 20 BRPM

## 2018-01-21 VITALS
HEART RATE: 97 BPM | DIASTOLIC BLOOD PRESSURE: 75 MMHG | RESPIRATION RATE: 17 BRPM | TEMPERATURE: 98.5 F | SYSTOLIC BLOOD PRESSURE: 119 MMHG | OXYGEN SATURATION: 96 %

## 2018-01-21 VITALS
OXYGEN SATURATION: 98 % | SYSTOLIC BLOOD PRESSURE: 134 MMHG | HEART RATE: 88 BPM | DIASTOLIC BLOOD PRESSURE: 76 MMHG | TEMPERATURE: 98.3 F | RESPIRATION RATE: 18 BRPM

## 2018-01-21 VITALS
SYSTOLIC BLOOD PRESSURE: 121 MMHG | HEART RATE: 89 BPM | DIASTOLIC BLOOD PRESSURE: 79 MMHG | TEMPERATURE: 99.8 F | OXYGEN SATURATION: 96 % | RESPIRATION RATE: 20 BRPM

## 2018-01-21 LAB
BASOPHILS # BLD AUTO: 0 TH/MM3 (ref 0–0.2)
BASOPHILS NFR BLD: 0.4 % (ref 0–2)
BUN SERPL-MCNC: 10 MG/DL (ref 7–18)
CALCIUM SERPL-MCNC: 10.4 MG/DL (ref 8.5–10.1)
CHLORIDE SERPL-SCNC: 105 MEQ/L (ref 98–107)
CREAT SERPL-MCNC: 0.8 MG/DL (ref 0.5–1)
EOSINOPHIL # BLD: 0.2 TH/MM3 (ref 0–0.4)
EOSINOPHIL NFR BLD: 3.6 % (ref 0–4)
ERYTHROCYTE [DISTWIDTH] IN BLOOD BY AUTOMATED COUNT: 16.5 % (ref 11.6–17.2)
GFR SERPLBLD BASED ON 1.73 SQ M-ARVRAT: 85 ML/MIN (ref 89–?)
GLUCOSE SERPL-MCNC: 125 MG/DL (ref 74–106)
HBA1C MFR BLD: 5.1 % (ref 4.3–6)
HCO3 BLD-SCNC: 27.3 MEQ/L (ref 21–32)
HCT VFR BLD CALC: 26.4 % (ref 35–46)
HGB BLD-MCNC: 8.3 GM/DL (ref 11.6–15.3)
LYMPHOCYTES # BLD AUTO: 1.5 TH/MM3 (ref 1–4.8)
LYMPHOCYTES NFR BLD AUTO: 25.9 % (ref 9–44)
MCH RBC QN AUTO: 23.3 PG (ref 27–34)
MCHC RBC AUTO-ENTMCNC: 31.6 % (ref 32–36)
MCV RBC AUTO: 73.7 FL (ref 80–100)
MONOCYTE #: 0.5 TH/MM3 (ref 0–0.9)
MONOCYTES NFR BLD: 9 % (ref 0–8)
NEUTROPHILS # BLD AUTO: 3.8 TH/MM3 (ref 1.8–7.7)
NEUTROPHILS NFR BLD AUTO: 61.1 % (ref 16–70)
PLATELET # BLD: 330 TH/MM3 (ref 150–450)
PMV BLD AUTO: 7.9 FL (ref 7–11)
RBC # BLD AUTO: 3.58 MIL/MM3 (ref 4–5.3)
SODIUM SERPL-SCNC: 140 MEQ/L (ref 136–145)
WBC # BLD AUTO: 6 TH/MM3 (ref 4–11)

## 2018-01-21 RX ADMIN — HYDROCODONE BITARTRATE AND ACETAMINOPHEN PRN TAB: 5; 325 TABLET ORAL at 10:48

## 2018-01-21 RX ADMIN — INSULIN ASPART SCH: 100 INJECTION, SOLUTION INTRAVENOUS; SUBCUTANEOUS at 21:08

## 2018-01-21 RX ADMIN — Medication SCH ML: at 21:09

## 2018-01-21 RX ADMIN — HEPARIN SODIUM SCH UNITS: 10000 INJECTION, SOLUTION INTRAVENOUS; SUBCUTANEOUS at 05:29

## 2018-01-21 RX ADMIN — INSULIN ASPART SCH: 100 INJECTION, SOLUTION INTRAVENOUS; SUBCUTANEOUS at 13:36

## 2018-01-21 RX ADMIN — HYDROCODONE BITARTRATE AND ACETAMINOPHEN PRN TAB: 5; 325 TABLET ORAL at 03:50

## 2018-01-21 RX ADMIN — Medication SCH ML: at 07:50

## 2018-01-21 RX ADMIN — HEPARIN SODIUM SCH UNITS: 10000 INJECTION, SOLUTION INTRAVENOUS; SUBCUTANEOUS at 13:35

## 2018-01-21 RX ADMIN — STANDARDIZED SENNA CONCENTRATE AND DOCUSATE SODIUM SCH TAB: 8.6; 5 TABLET, FILM COATED ORAL at 07:50

## 2018-01-21 RX ADMIN — HEPARIN SODIUM SCH UNITS: 10000 INJECTION, SOLUTION INTRAVENOUS; SUBCUTANEOUS at 21:09

## 2018-01-21 RX ADMIN — INSULIN ASPART SCH: 100 INJECTION, SOLUTION INTRAVENOUS; SUBCUTANEOUS at 07:50

## 2018-01-21 RX ADMIN — STANDARDIZED SENNA CONCENTRATE AND DOCUSATE SODIUM SCH TAB: 8.6; 5 TABLET, FILM COATED ORAL at 21:09

## 2018-01-21 RX ADMIN — INSULIN ASPART SCH: 100 INJECTION, SOLUTION INTRAVENOUS; SUBCUTANEOUS at 17:57

## 2018-01-21 NOTE — HHI.PR
Subjective


Remarks


Patient seen and evaluated today in follow-up for weakness with urinary tract 

infection.  Good bowel movement overnight.


Abdominal discomfort is improved.


I did discuss with the patient findings of CT scan





Objective


Vitals





Vital Signs








  Date Time  Temp Pulse Resp B/P (MAP) Pulse Ox O2 Delivery O2 Flow Rate FiO2


 


1/21/18 08:00 98.3 88 18 134/76 (95) 98   


 


1/21/18 00:00 97.4 80 20 114/60 (78) 97   


 


1/20/18 20:00 99.5 90 20 124/63 (83) 97   


 


1/20/18 16:00 99.0 96 18 130/76 (94) 100   


 


1/20/18 14:19   18     


 


1/20/18 12:00 98.6 99 20 115/70 (85) 96   














I/O      


 


 1/20/18 1/20/18 1/20/18 1/21/18 1/21/18 1/21/18





 07:00 15:00 23:00 07:00 15:00 23:00


 


Intake Total 1720 ml 625 ml  120 ml  


 


Output Total   250 ml   


 


Balance 1720 ml 625 ml -250 ml 120 ml  


 


      


 


Intake Oral 720 ml 625 ml  120 ml  


 


IV Total 1000 ml     


 


Output Urine Total   250 ml   


 


# Voids 5 2 2 4  


 


# Bowel Movements  0    








Result Diagram:  


1/21/18 0630                                                                   

             1/21/18 0630





Imaging





Last Impressions








Abdomen/Pelvis CT 1/20/18 0000 Signed





Impressions: 





 Service Date/Time:  Saturday, January 20, 2018 15:46 - CONCLUSION:  1. Large 





 staghorn calculus right kidney. 2. Numerous low-density lesions both kidneys 





 likely cysts. 3. Intermediate attenuation lesion left mid kidney and right 

upper 





 pole measuring 2.9 cm is indeterminate measuring 4.4 cm. Solid lesions cannot 

be 





 excluded. Nonemergent outpatient contrasted MRI is recommended. 4. Numerous 





 large masses projecting from the uterus into the lower abdomen likely 

calcified 





 leiomyomas.     Urban Hdez MD 


 


Head CT 1/19/18 0353 Signed





Impressions: 





 Service Date/Time:  Friday, January 19, 2018 04:09 - CONCLUSION:  1. Senescent 





 changes without acute intracranial abnormality.     Mario Del Rosario MD 


 


Chest X-Ray 1/19/18 0353 Signed





Impressions: 





 Service Date/Time:  Friday, January 19, 2018 04:17 - CONCLUSION:  1. No acute 





 cardiopulmonary disease.     Mario Del Rosario MD 


 


Lower Extremity Ultrasound 1/19/18 0000 Signed





Impressions: 





 Service Date/Time:  Friday, January 19, 2018 11:52 - CONCLUSION:  Negative 

exam 





 with no evidence of deep venous thrombosis.     Kamar Zimmerman MD 


 


Abdomen X-Ray 1/19/18 0000 Signed





Impressions: 





 Service Date/Time:  Friday, January 19, 2018 16:36 - CONCLUSION:  1. Staghorn 





 calculus filling the right kidney. 2. Possible calcified mass in the 

midabdomen 





 and pelvis please see above discussion.     Antonio Santos MD 








Objective Remarks


GENERAL: This is a well-nourished, well-developed patient, in no apparent 

distress.


CARDIOVASCULAR: Regular rate and rhythm without murmurs, gallops, or rubs. 


RESPIRATORY: Clear to auscultation. Breath sounds equal bilaterally. No wheezes

, rales, or rhonchi.  


GASTROINTESTINAL: Abdomen soft, non-tender, nondistended. Normal active bowel 

sounds


MUSCULOSKELETAL: Extremities without clubbing, cyanosis, there is +2 edema


NEURO:  Burn on the left leg resolved Alert & Oriented x4 to person, place, time

, situation.  3 out of 5 muscle strength bilaterally





A/P


Problem List:  


(1) Encephalopathy acute


ICD Code:  G93.40 - Encephalopathy, unspecified


Plan:  Resolved, patient at baseline at this time as per family in the room. 


Likely due to dehydration and urinary tract infection





(2) Urinary tract infection


ICD Code:  N39.0 - Urinary tract infection, site not specified


Status:  Acute


Plan:  Continue empiric Rocephin, gram-negative rods pending in cultures





(3) Anemia


ICD Code:  D64.9 - Anemia, unspecified


Status:  Acute


Plan:  Anemia of chronic disease


Abdominal images warranted further imaging of the abdomen and pelvis his 

abdomen has been more distended than usual


CT abdomen pelvis with contrast shows calcified fibroids and constipation with 

regulations for nonemergent MRI to assess renal findings





(4) Hyperglycemia


ICD Code:  R73.9 - Hyperglycemia, unspecified


Plan:  I will continue to follow on sliding scale with insulin, hemoglobin A1c 

pending





(5) Edema


ICD Code:  R60.9 - Edema, unspecified


Plan:  Negative ultrasound for DVT





Discharge Planning


Likely discharge to skilled nursing facility in am when arrangements made





Problem Qualifiers





(1) Anemia:  


Qualified Codes:  D64.9 - Anemia, unspecified








Bren Livingston MD Jan 21, 2018 09:17

## 2018-01-22 VITALS
DIASTOLIC BLOOD PRESSURE: 86 MMHG | OXYGEN SATURATION: 99 % | SYSTOLIC BLOOD PRESSURE: 148 MMHG | TEMPERATURE: 98 F | RESPIRATION RATE: 12 BRPM | HEART RATE: 88 BPM

## 2018-01-22 VITALS
SYSTOLIC BLOOD PRESSURE: 157 MMHG | HEART RATE: 87 BPM | RESPIRATION RATE: 14 BRPM | TEMPERATURE: 97.6 F | OXYGEN SATURATION: 100 % | DIASTOLIC BLOOD PRESSURE: 95 MMHG

## 2018-01-22 VITALS
DIASTOLIC BLOOD PRESSURE: 80 MMHG | TEMPERATURE: 98.1 F | RESPIRATION RATE: 18 BRPM | OXYGEN SATURATION: 95 % | HEART RATE: 92 BPM | SYSTOLIC BLOOD PRESSURE: 122 MMHG

## 2018-01-22 VITALS — RESPIRATION RATE: 18 BRPM

## 2018-01-22 RX ADMIN — HYDROCODONE BITARTRATE AND ACETAMINOPHEN PRN TAB: 5; 325 TABLET ORAL at 04:16

## 2018-01-22 RX ADMIN — HYDROCODONE BITARTRATE AND ACETAMINOPHEN PRN TAB: 5; 325 TABLET ORAL at 13:06

## 2018-01-22 RX ADMIN — STANDARDIZED SENNA CONCENTRATE AND DOCUSATE SODIUM SCH TAB: 8.6; 5 TABLET, FILM COATED ORAL at 08:12

## 2018-01-22 RX ADMIN — INSULIN ASPART SCH: 100 INJECTION, SOLUTION INTRAVENOUS; SUBCUTANEOUS at 12:31

## 2018-01-22 RX ADMIN — HEPARIN SODIUM SCH UNITS: 10000 INJECTION, SOLUTION INTRAVENOUS; SUBCUTANEOUS at 05:11

## 2018-01-22 RX ADMIN — HEPARIN SODIUM SCH UNITS: 10000 INJECTION, SOLUTION INTRAVENOUS; SUBCUTANEOUS at 13:06

## 2018-01-22 RX ADMIN — Medication SCH ML: at 08:12

## 2018-01-22 RX ADMIN — INSULIN ASPART SCH: 100 INJECTION, SOLUTION INTRAVENOUS; SUBCUTANEOUS at 08:12

## 2018-01-22 NOTE — HHI.PR
Subjective


Remarks


Patient more alert and oriented. 


No fever or chills.


Back at baseline mentation.


Had some nausea but did not vomit, tolerates food.  Has constipation.  No 

abdominal pain.





Objective


Vitals





Vital Signs








  Date Time  Temp Pulse Resp B/P (MAP) Pulse Ox O2 Delivery O2 Flow Rate FiO2


 


1/22/18 08:00 98.0 88 12 148/86 (106) 99   


 


1/22/18 00:00 98.1 92 18 122/80 (94) 95   


 


1/21/18 20:00 98.5 97 17 119/75 (90) 96   


 


1/21/18 16:00 99.8 89 20 121/79 (93) 96   


 


1/21/18 12:00 100.0 93 18 133/85 (101) 97   














I/O      


 


 1/21/18 1/21/18 1/21/18 1/22/18 1/22/18 1/22/18





 07:00 15:00 23:00 07:00 15:00 23:00


 


Intake Total 120 ml 870 ml  280 ml  


 


Balance 120 ml 870 ml  280 ml  


 


      


 


Intake Oral 120 ml 870 ml  180 ml  


 


IV Total    100 ml  


 


# Voids 4 4    


 


# Bowel Movements  0    








Result Diagram:  


1/21/18 0630                                                                   

             1/21/18 0630





Imaging





Last Impressions








Abdomen/Pelvis CT 1/20/18 0000 Signed





Impressions: 





 Service Date/Time:  Saturday, January 20, 2018 15:46 - CONCLUSION:  1. Large 





 staghorn calculus right kidney. 2. Numerous low-density lesions both kidneys 





 likely cysts. 3. Intermediate attenuation lesion left mid kidney and right 

upper 





 pole measuring 2.9 cm is indeterminate measuring 4.4 cm. Solid lesions cannot 

be 





 excluded. Nonemergent outpatient contrasted MRI is recommended. 4. Numerous 





 large masses projecting from the uterus into the lower abdomen likely 

calcified 





 leiomyomas.     Urban Hdez MD 


 


Head CT 1/19/18 0353 Signed





Impressions: 





 Service Date/Time:  Friday, January 19, 2018 04:09 - CONCLUSION:  1. Senescent 





 changes without acute intracranial abnormality.     Mario Del Rosario MD 


 


Chest X-Ray 1/19/18 0353 Signed





Impressions: 





 Service Date/Time:  Friday, January 19, 2018 04:17 - CONCLUSION:  1. No acute 





 cardiopulmonary disease.     Mario Del Rosario MD 


 


Lower Extremity Ultrasound 1/19/18 0000 Signed





Impressions: 





 Service Date/Time:  Friday, January 19, 2018 11:52 - CONCLUSION:  Negative 

exam 





 with no evidence of deep venous thrombosis.     Kamar Zimmerman MD 


 


Abdomen X-Ray 1/19/18 0000 Signed





Impressions: 





 Service Date/Time:  Friday, January 19, 2018 16:36 - CONCLUSION:  1. Staghorn 





 calculus filling the right kidney. 2. Possible calcified mass in the 

midabdomen 





 and pelvis please see above discussion.     Antonio Santos MD 








Objective Remarks


GENERAL: This is a well-nourished, well-developed patient, in no apparent 

distress.


CARDIOVASCULAR: Regular rate and rhythm without murmurs, gallops, or rubs. 


RESPIRATORY: Clear to auscultation. Breath sounds equal bilaterally. No wheezes

, rales, or rhonchi.  


GASTROINTESTINAL: Abdomen soft, non-tender, nondistended. Normal active bowel 

sounds


MUSCULOSKELETAL: Extremities without clubbing, cyanosis, there is +2 edema


NEURO:  Burn on the left leg resolved Alert & Oriented x4 to person, place, time

, situation.  3 out of 5 muscle strength bilaterally








A/P


Problem List:  


(1) Encephalopathy acute


ICD Code:  G93.40 - Encephalopathy, unspecified


(2) Urinary tract infection


ICD Code:  N39.0 - Urinary tract infection, site not specified


Status:  Acute


(3) Anemia


ICD Code:  D64.9 - Anemia, unspecified


Status:  Acute


(4) Hyperglycemia


ICD Code:  R73.9 - Hyperglycemia, unspecified


(5) Edema


ICD Code:  R60.9 - Edema, unspecified


Assessment and Plan


Encephalopathy acute


Resolved, patient at baseline at this time as per family in the room. 


Likely due to dehydration and urinary tract infection





Urinary tract infection


 Continue empiric Rocephin, gram-negative rods pending in cultures





Anemia


Anemia of chronic disease


Abdominal images warranted further imaging of the abdomen and pelvis his 

abdomen has been more distended than usual


CT abdomen pelvis with contrast shows calcified fibroids and constipation with 

regulations for nonemergent MRI to assess renal findings





Hyperglycemia


  I will continue to follow on sliding scale with insulin, hemoglobin A1c 

pending





Edema


Negative ultrasound for DVT





Constipation: laxatives/ stool softeners prn 





Discharge to skilled nursing facility when arrangements made





Problem Qualifiers





(1) Anemia:  


Qualified Codes:  D64.9 - Anemia, unspecified








iMra Cruz MD Jan 22, 2018 09:30

## 2018-01-22 NOTE — HHI.DS
__________________________________________________





Discharge Summary


Admission Date


Jan 19, 2018 at 05:04


Discharge Date:  Jan 22, 2018


Admitting Diagnosis





altered mental status, urinary tract infection, mild dehydration, an





(1) Encephalopathy acute


ICD Code:  G93.40 - Encephalopathy, unspecified


(2) Urinary tract infection


ICD Code:  N39.0 - Urinary tract infection, site not specified


Status:  Acute


(3) Anemia


ICD Code:  D64.9 - Anemia, unspecified


Status:  Acute


(4) Hyperglycemia


ICD Code:  R73.9 - Hyperglycemia, unspecified


(5) Edema


ICD Code:  R60.9 - Edema, unspecified


Procedures


none


Brief History - From Admission


This is a 71-year-old female with past medical history of hypertension, 

hyperlipidemia, previous pyelonephritis who was brought in because of complaint 

of dysuria and urgency.  The patient is a very poor historian but she states 

she has had this problem since age 3.  The patient denies any chest pain, 

shortness of breath, states she has not had fevers but felt warm at home but 

denies chills.  As per ED medical records the patient was lethargic when 

examined initially.  The patient also complains of bilateral lower extremity 

swelling however she states that her right lower extremity is more swollen than 

the left.  She has a state that she has been in bed for the past 2 days with 

generalized weakness unable to ambulate.


CBC/BMP:  


1/21/18 0630                                                                   

             1/21/18 0630





Significant Findings





Laboratory Tests








Test


  1/20/18


06:15 1/21/18


06:30


 


Red Blood Count


  3.60 MIL/MM3


(4.00-5.30) 3.58 MIL/MM3


(4.00-5.30)


 


Hemoglobin


  8.2 GM/DL


(11.6-15.3) 8.3 GM/DL


(11.6-15.3)


 


Hematocrit


  27.0 %


(35.0-46.0) 26.4 %


(35.0-46.0)


 


Mean Corpuscular Volume


  75.1 FL


(80.0-100.0) 73.7 FL


(80.0-100.0)


 


Mean Corpuscular Hemoglobin


  22.9 PG


(27.0-34.0) 23.3 PG


(27.0-34.0)


 


Mean Corpuscular Hemoglobin


Concent 30.5 %


(32.0-36.0) 31.6 %


(32.0-36.0)


 


Monocytes (%) (Auto)


  9.6 %


(0.0-8.0) 9.0 %


(0.0-8.0)


 


Random Glucose


  137 MG/DL


() 125 MG/DL


()


 


Estimat Glomerular Filtration


Rate 72 ML/MIN


(>89) 85 ML/MIN


(>89)


 


Calcium Level


  


  10.4 MG/DL


(8.5-10.1)








Imaging





Last Impressions








Abdomen/Pelvis CT 1/20/18 0000 Signed





Impressions: 





 Service Date/Time:  Saturday, January 20, 2018 15:46 - CONCLUSION:  1. Large 





 staghorn calculus right kidney. 2. Numerous low-density lesions both kidneys 





 likely cysts. 3. Intermediate attenuation lesion left mid kidney and right 

upper 





 pole measuring 2.9 cm is indeterminate measuring 4.4 cm. Solid lesions cannot 

be 





 excluded. Nonemergent outpatient contrasted MRI is recommended. 4. Numerous 





 large masses projecting from the uterus into the lower abdomen likely 

calcified 





 leiomyomas.     Urban Hdez MD 


 


Head CT 1/19/18 0353 Signed





Impressions: 





 Service Date/Time:  Friday, January 19, 2018 04:09 - CONCLUSION:  1. Senescent 





 changes without acute intracranial abnormality.     Mario Del Rosario MD 


 


Chest X-Ray 1/19/18 0353 Signed





Impressions: 





 Service Date/Time:  Friday, January 19, 2018 04:17 - CONCLUSION:  1. No acute 





 cardiopulmonary disease.     Mario Del Rosario MD 


 


Lower Extremity Ultrasound 1/19/18 0000 Signed





Impressions: 





 Service Date/Time:  Friday, January 19, 2018 11:52 - CONCLUSION:  Negative 

exam 





 with no evidence of deep venous thrombosis.     Kamar Zimmerman MD 


 


Abdomen X-Ray 1/19/18 0000 Signed





Impressions: 





 Service Date/Time:  Friday, January 19, 2018 16:36 - CONCLUSION:  1. Staghorn 





 calculus filling the right kidney. 2. Possible calcified mass in the 

midabdomen 





 and pelvis please see above discussion.     Antonio Santos MD 








PE at Discharge


GENERAL: This is a well-nourished, well-developed patient, in no apparent 

distress.


CARDIOVASCULAR: Regular rate and rhythm without murmurs, gallops, or rubs. 


RESPIRATORY: Clear to auscultation. Breath sounds equal bilaterally. No wheezes

, rales, or rhonchi.  


GASTROINTESTINAL: Abdomen soft, non-tender, nondistended. Normal active bowel 

sounds


MUSCULOSKELETAL: Extremities without clubbing, cyanosis, there is +2 edema


NEURO:  Burn on the left leg resolved Alert & Oriented x4 to person, place, time

, situation.  3 out of 5 muscle strength bilaterally





Hospital Course


This is a 71-year-old female with past medical history of hypertension, 

hyperlipidemia, previous pyelonephritis who was brought in because of complaint 

of dysuria and urgency.  The patient is a very poor historian but she states 

she has had this problem since age 3. 


Encephalopathy acute. Resolved, patient at baseline at this time as per family 

in the room. Likely due to dehydration and urinary tract infection. 


Urinary tract infection.  Received  empiric Rocephin, gram-negative rods, 

Proteus mirabilis pansensitive DC on cipro PO 


Anemia/ Anemia of chronic disease


Abdominal images warranted further imaging of the abdomen and pelvis his 

abdomen has been more distended than usual


CT abdomen pelvis with contrast shows calcified fibroids and constipation with 

regulations for nonemergent MRI to assess renal findings


Hyperglycemia. I will continue to follow on sliding scale with insulin, 

hemoglobin A1c pending. Resume home meds to follow up as OP 





Edema Negative ultrasound for DVT


Constipation: laxatives/ stool softeners prn 


Discharge to skilled nursing facility in stable condition to follow up as OP 

with PCP and consultants.


Pt Condition on Discharge:  Stable


Discharge Disposition:  Disch w/ Home Health Serv


Discharge Time:  > 30 minutes


Discharge Instructions


DIET: Follow Instructions for:  As Tolerated, No Restrictions


Activities you can perform:  Regular-No Restrictions


Follow up Referrals:  


PCP Follow-up - 2-3 Days





New Medications:  


Ciprofloxacin (Cipro) 500 Mg Tab


500 MG PO Q12HR for uti, #10 TAB





Sennosides-Docusate Sodium (Gnp Senna Plus 8.6-50 mg) 8.6 Mg-50 Mg Tab


1 TAB PO BID for Constipation, #60 TAB





 


Continued Medications:  


Amlodipine (Amlodipine) 5 Mg Tab


5 MG PO DAILY for Blood Pressure Management, #30 TAB 0 Refills





Atorvastatin (Atorvastatin) 20 Mg Tab


20 MG PO HS for Cholesterol Management, #30 TAB 0 Refills





Hydrocodone-Acetaminophen (Norco) 5 Mg-325 Mg Tab


1 TAB PO Q4H PRN for PAIN, #30 TAB 0 Refills (This prescription has been renewed

)





Lisinopril (Lisinopril) 20 Mg Tab


20 MG PO DAILY, #30 TAB 0 Refills

















Mira Cruz MD Jan 22, 2018 09:30

## 2018-02-12 ENCOUNTER — HOSPITAL ENCOUNTER (INPATIENT)
Dept: HOSPITAL 17 - NEPC | Age: 73
LOS: 10 days | Discharge: NURSING, REHAB OR LONG-TERM CARE | DRG: 871 | End: 2018-02-22
Attending: HOSPITALIST | Admitting: HOSPITALIST
Payer: MEDICARE

## 2018-02-12 VITALS
TEMPERATURE: 99.5 F | RESPIRATION RATE: 22 BRPM | SYSTOLIC BLOOD PRESSURE: 99 MMHG | DIASTOLIC BLOOD PRESSURE: 57 MMHG | OXYGEN SATURATION: 100 % | HEART RATE: 103 BPM

## 2018-02-12 VITALS
OXYGEN SATURATION: 100 % | DIASTOLIC BLOOD PRESSURE: 55 MMHG | SYSTOLIC BLOOD PRESSURE: 100 MMHG | TEMPERATURE: 98.9 F | HEART RATE: 105 BPM | RESPIRATION RATE: 20 BRPM

## 2018-02-12 VITALS
RESPIRATION RATE: 19 BRPM | HEART RATE: 84 BPM | TEMPERATURE: 98.8 F | DIASTOLIC BLOOD PRESSURE: 51 MMHG | SYSTOLIC BLOOD PRESSURE: 108 MMHG | OXYGEN SATURATION: 100 %

## 2018-02-12 VITALS
HEART RATE: 94 BPM | OXYGEN SATURATION: 98 % | DIASTOLIC BLOOD PRESSURE: 65 MMHG | SYSTOLIC BLOOD PRESSURE: 107 MMHG | TEMPERATURE: 97.6 F | RESPIRATION RATE: 20 BRPM

## 2018-02-12 VITALS — HEART RATE: 92 BPM

## 2018-02-12 VITALS
OXYGEN SATURATION: 100 % | DIASTOLIC BLOOD PRESSURE: 59 MMHG | HEART RATE: 98 BPM | TEMPERATURE: 99 F | RESPIRATION RATE: 20 BRPM | SYSTOLIC BLOOD PRESSURE: 99 MMHG

## 2018-02-12 VITALS
SYSTOLIC BLOOD PRESSURE: 90 MMHG | OXYGEN SATURATION: 98 % | DIASTOLIC BLOOD PRESSURE: 53 MMHG | RESPIRATION RATE: 14 BRPM | HEART RATE: 105 BPM

## 2018-02-12 VITALS
OXYGEN SATURATION: 100 % | SYSTOLIC BLOOD PRESSURE: 108 MMHG | RESPIRATION RATE: 24 BRPM | HEART RATE: 104 BPM | DIASTOLIC BLOOD PRESSURE: 59 MMHG

## 2018-02-12 VITALS
HEART RATE: 104 BPM | OXYGEN SATURATION: 99 % | RESPIRATION RATE: 18 BRPM | TEMPERATURE: 98.6 F | DIASTOLIC BLOOD PRESSURE: 55 MMHG | SYSTOLIC BLOOD PRESSURE: 90 MMHG

## 2018-02-12 VITALS
RESPIRATION RATE: 20 BRPM | HEART RATE: 96 BPM | SYSTOLIC BLOOD PRESSURE: 126 MMHG | OXYGEN SATURATION: 100 % | DIASTOLIC BLOOD PRESSURE: 75 MMHG | TEMPERATURE: 98.4 F

## 2018-02-12 VITALS
HEART RATE: 100 BPM | RESPIRATION RATE: 15 BRPM | OXYGEN SATURATION: 100 % | TEMPERATURE: 98.9 F | DIASTOLIC BLOOD PRESSURE: 57 MMHG | SYSTOLIC BLOOD PRESSURE: 101 MMHG

## 2018-02-12 VITALS — HEART RATE: 102 BPM

## 2018-02-12 VITALS
TEMPERATURE: 98.9 F | OXYGEN SATURATION: 100 % | DIASTOLIC BLOOD PRESSURE: 48 MMHG | SYSTOLIC BLOOD PRESSURE: 85 MMHG | RESPIRATION RATE: 23 BRPM | HEART RATE: 106 BPM

## 2018-02-12 VITALS — OXYGEN SATURATION: 99 % | RESPIRATION RATE: 14 BRPM

## 2018-02-12 VITALS
DIASTOLIC BLOOD PRESSURE: 53 MMHG | SYSTOLIC BLOOD PRESSURE: 100 MMHG | RESPIRATION RATE: 24 BRPM | HEART RATE: 106 BPM | OXYGEN SATURATION: 100 % | TEMPERATURE: 98.5 F

## 2018-02-12 VITALS — HEART RATE: 98 BPM

## 2018-02-12 VITALS — WEIGHT: 162.48 LBS | HEIGHT: 64 IN | BODY MASS INDEX: 27.74 KG/M2

## 2018-02-12 VITALS — HEART RATE: 88 BPM

## 2018-02-12 DIAGNOSIS — M25.551: ICD-10-CM

## 2018-02-12 DIAGNOSIS — G93.41: ICD-10-CM

## 2018-02-12 DIAGNOSIS — D69.6: ICD-10-CM

## 2018-02-12 DIAGNOSIS — N20.0: ICD-10-CM

## 2018-02-12 DIAGNOSIS — N17.9: ICD-10-CM

## 2018-02-12 DIAGNOSIS — I10: ICD-10-CM

## 2018-02-12 DIAGNOSIS — D25.9: ICD-10-CM

## 2018-02-12 DIAGNOSIS — E83.39: ICD-10-CM

## 2018-02-12 DIAGNOSIS — I25.10: ICD-10-CM

## 2018-02-12 DIAGNOSIS — A41.9: Primary | ICD-10-CM

## 2018-02-12 DIAGNOSIS — D50.9: ICD-10-CM

## 2018-02-12 DIAGNOSIS — R65.21: ICD-10-CM

## 2018-02-12 DIAGNOSIS — Z79.891: ICD-10-CM

## 2018-02-12 DIAGNOSIS — K64.8: ICD-10-CM

## 2018-02-12 DIAGNOSIS — Z87.442: ICD-10-CM

## 2018-02-12 DIAGNOSIS — N12: ICD-10-CM

## 2018-02-12 DIAGNOSIS — I82.612: ICD-10-CM

## 2018-02-12 DIAGNOSIS — E86.0: ICD-10-CM

## 2018-02-12 DIAGNOSIS — E87.0: ICD-10-CM

## 2018-02-12 DIAGNOSIS — N13.9: ICD-10-CM

## 2018-02-12 DIAGNOSIS — C54.1: ICD-10-CM

## 2018-02-12 DIAGNOSIS — E88.09: ICD-10-CM

## 2018-02-12 DIAGNOSIS — Z87.440: ICD-10-CM

## 2018-02-12 DIAGNOSIS — K57.31: ICD-10-CM

## 2018-02-12 DIAGNOSIS — R73.9: ICD-10-CM

## 2018-02-12 DIAGNOSIS — E78.00: ICD-10-CM

## 2018-02-12 DIAGNOSIS — E87.6: ICD-10-CM

## 2018-02-12 LAB
% SATURATION IRON PROFILE: 34.5 % (ref 20–50)
ALBUMIN SERPL-MCNC: 1.6 GM/DL (ref 3.4–5)
ALP SERPL-CCNC: 98 U/L (ref 45–117)
ALT SERPL-CCNC: 12 U/L (ref 10–53)
AMORPHOUS SEDIMENT, URINE: (no result)
AST SERPL-CCNC: 13 U/L (ref 15–37)
BACTERIA #/AREA URNS HPF: (no result) /HPF
BASOPHILS # BLD AUTO: 0.1 TH/MM3 (ref 0–0.2)
BASOPHILS NFR BLD: 0.1 % (ref 0–2)
BILIRUB SERPL-MCNC: 0.3 MG/DL (ref 0.2–1)
BUN SERPL-MCNC: 61 MG/DL (ref 7–18)
CALCIUM SERPL-MCNC: 8.1 MG/DL (ref 8.5–10.1)
CHLORIDE SERPL-SCNC: 121 MEQ/L (ref 98–107)
COLOR UR: YELLOW
CREAT SERPL-MCNC: 1.95 MG/DL (ref 0.5–1)
EOSINOPHIL # BLD: 0 TH/MM3 (ref 0–0.4)
EOSINOPHIL NFR BLD: 0.1 % (ref 0–4)
ERYTHROCYTE [DISTWIDTH] IN BLOOD BY AUTOMATED COUNT: 19.6 % (ref 11.6–17.2)
FERRITIN SERPL-MCNC: 2835 NG/ML (ref 8–252)
GFR SERPLBLD BASED ON 1.73 SQ M-ARVRAT: 30 ML/MIN (ref 89–?)
GLUCOSE SERPL-MCNC: 113 MG/DL (ref 74–106)
GLUCOSE UR STRIP-MCNC: (no result) MG/DL
HCO3 BLD-SCNC: 21.2 MEQ/L (ref 21–32)
HCT VFR BLD CALC: 19.2 % (ref 35–46)
HCT VFR BLD CALC: 31.1 % (ref 35–46)
HCT VFR BLD CALC: 31.2 % (ref 35–46)
HGB BLD-MCNC: 10 GM/DL (ref 11.6–15.3)
HGB BLD-MCNC: 10.4 GM/DL (ref 11.6–15.3)
HGB BLD-MCNC: 6 GM/DL (ref 11.6–15.3)
HGB UR QL STRIP: (no result)
HYALINE CASTS #/AREA URNS LPF: 25 /LPF
INR PPP: 1.2 RATIO
IRON (FE): 58 MCG/DL (ref 50–170)
KETONES UR STRIP-MCNC: (no result) MG/DL
LYMPHOCYTES # BLD AUTO: 2 TH/MM3 (ref 1–4.8)
LYMPHOCYTES NFR BLD AUTO: 5.7 % (ref 9–44)
LYMPHOCYTES: 5 % (ref 9–44)
MCH RBC QN AUTO: 24.8 PG (ref 27–34)
MCHC RBC AUTO-ENTMCNC: 31.5 % (ref 32–36)
MCV RBC AUTO: 78.6 FL (ref 80–100)
MONOCYTE #: 1.9 TH/MM3 (ref 0–0.9)
MONOCYTES NFR BLD: 5.5 % (ref 0–8)
MONOCYTES: 7 % (ref 0–8)
MUCOUS THREADS #/AREA URNS LPF: (no result) /LPF
NEUTROPHILS # BLD AUTO: 30.7 TH/MM3 (ref 1.8–7.7)
NEUTROPHILS NFR BLD AUTO: 88.6 % (ref 16–70)
NEUTS BAND # BLD MANUAL: 30.5 TH/MM3 (ref 1.8–7.7)
NEUTS BAND NFR BLD: 16 % (ref 0–6)
NEUTS SEG NFR BLD MANUAL: 72 % (ref 16–70)
NITRITE UR QL STRIP: (no result)
PLATELET # BLD: 393 TH/MM3 (ref 150–450)
PMV BLD AUTO: 7.5 FL (ref 7–11)
PROT SERPL-MCNC: 5.8 GM/DL (ref 6.4–8.2)
PROTHROMBIN TIME: 11.7 SEC (ref 9.8–11.6)
RBC # BLD AUTO: 2.44 MIL/MM3 (ref 4–5.3)
RETIC #: 133.1 MIL/L (ref 20–150)
RETICS/RBC NFR: 5.3 % (ref 0.4–3)
SODIUM SERPL-SCNC: 151 MEQ/L (ref 136–145)
SODIUM,RANDOM URINE: 40 MEQ/L
SP GR UR STRIP: 1.01 (ref 1–1.03)
SQUAMOUS #/AREA URNS HPF: 13 /HPF (ref 0–5)
TIBC SERPL-MCNC: 168 MCG/DL (ref 250–450)
TOXIC GRANULES BLD QL SMEAR: (no result)
URINE LEUKOCYTE ESTERASE: (no result)
WBC # BLD AUTO: 34.7 TH/MM3 (ref 4–11)
WHITE BLOOD CELL CLUMPS: (no result)

## 2018-02-12 PROCEDURE — 85613 RUSSELL VIPER VENOM DILUTED: CPT

## 2018-02-12 PROCEDURE — 70450 CT HEAD/BRAIN W/O DYE: CPT

## 2018-02-12 PROCEDURE — C1769 GUIDE WIRE: HCPCS

## 2018-02-12 PROCEDURE — 85044 MANUAL RETICULOCYTE COUNT: CPT

## 2018-02-12 PROCEDURE — 85730 THROMBOPLASTIN TIME PARTIAL: CPT

## 2018-02-12 PROCEDURE — 82570 ASSAY OF URINE CREATININE: CPT

## 2018-02-12 PROCEDURE — 85018 HEMOGLOBIN: CPT

## 2018-02-12 PROCEDURE — 85027 COMPLETE CBC AUTOMATED: CPT

## 2018-02-12 PROCEDURE — 84300 ASSAY OF URINE SODIUM: CPT

## 2018-02-12 PROCEDURE — C9113 INJ PANTOPRAZOLE SODIUM, VIA: HCPCS

## 2018-02-12 PROCEDURE — 99153 MOD SED SAME PHYS/QHP EA: CPT

## 2018-02-12 PROCEDURE — 86901 BLOOD TYPING SEROLOGIC RH(D): CPT

## 2018-02-12 PROCEDURE — 82728 ASSAY OF FERRITIN: CPT

## 2018-02-12 PROCEDURE — 87205 SMEAR GRAM STAIN: CPT

## 2018-02-12 PROCEDURE — 86850 RBC ANTIBODY SCREEN: CPT

## 2018-02-12 PROCEDURE — 87040 BLOOD CULTURE FOR BACTERIA: CPT

## 2018-02-12 PROCEDURE — 85007 BL SMEAR W/DIFF WBC COUNT: CPT

## 2018-02-12 PROCEDURE — 87186 SC STD MICRODIL/AGAR DIL: CPT

## 2018-02-12 PROCEDURE — 85384 FIBRINOGEN ACTIVITY: CPT

## 2018-02-12 PROCEDURE — 81001 URINALYSIS AUTO W/SCOPE: CPT

## 2018-02-12 PROCEDURE — 86900 BLOOD TYPING SEROLOGIC ABO: CPT

## 2018-02-12 PROCEDURE — 87086 URINE CULTURE/COLONY COUNT: CPT

## 2018-02-12 PROCEDURE — 83540 ASSAY OF IRON: CPT

## 2018-02-12 PROCEDURE — 85379 FIBRIN DEGRADATION QUANT: CPT

## 2018-02-12 PROCEDURE — 83735 ASSAY OF MAGNESIUM: CPT

## 2018-02-12 PROCEDURE — 87077 CULTURE AEROBIC IDENTIFY: CPT

## 2018-02-12 PROCEDURE — 76937 US GUIDE VASCULAR ACCESS: CPT

## 2018-02-12 PROCEDURE — 86920 COMPATIBILITY TEST SPIN: CPT

## 2018-02-12 PROCEDURE — BT1D1ZZ FLUOROSCOPY OF RIGHT KIDNEY, URETER AND BLADDER USING LOW OSMOLAR CONTRAST: ICD-10-PCS | Performed by: RADIOLOGY

## 2018-02-12 PROCEDURE — C1887 CATHETER, GUIDING: HCPCS

## 2018-02-12 PROCEDURE — 50433 PLMT NEPHROURETERAL CATHETER: CPT

## 2018-02-12 PROCEDURE — 36430 TRANSFUSION BLD/BLD COMPNT: CPT

## 2018-02-12 PROCEDURE — 83605 ASSAY OF LACTIC ACID: CPT

## 2018-02-12 PROCEDURE — 85597 PHOSPHOLIPID PLTLT NEUTRALIZ: CPT

## 2018-02-12 PROCEDURE — 93971 EXTREMITY STUDY: CPT

## 2018-02-12 PROCEDURE — 99152 MOD SED SAME PHYS/QHP 5/>YRS: CPT

## 2018-02-12 PROCEDURE — 80053 COMPREHEN METABOLIC PANEL: CPT

## 2018-02-12 PROCEDURE — 74176 CT ABD & PELVIS W/O CONTRAST: CPT

## 2018-02-12 PROCEDURE — 87641 MR-STAPH DNA AMP PROBE: CPT

## 2018-02-12 PROCEDURE — 83615 LACTATE (LD) (LDH) ENZYME: CPT

## 2018-02-12 PROCEDURE — 71045 X-RAY EXAM CHEST 1 VIEW: CPT

## 2018-02-12 PROCEDURE — 36556 INSERT NON-TUNNEL CV CATH: CPT

## 2018-02-12 PROCEDURE — 86403 PARTICLE AGGLUT ANTBDY SCRN: CPT

## 2018-02-12 PROCEDURE — 82948 REAGENT STRIP/BLOOD GLUCOSE: CPT

## 2018-02-12 PROCEDURE — P9016 RBC LEUKOCYTES REDUCED: HCPCS

## 2018-02-12 PROCEDURE — 86022 PLATELET ANTIBODIES: CPT

## 2018-02-12 PROCEDURE — 83550 IRON BINDING TEST: CPT

## 2018-02-12 PROCEDURE — 86146 BETA-2 GLYCOPROTEIN ANTIBODY: CPT

## 2018-02-12 PROCEDURE — 0T763DZ DILATION OF RIGHT URETER WITH INTRALUMINAL DEVICE, PERCUTANEOUS APPROACH: ICD-10-PCS | Performed by: RADIOLOGY

## 2018-02-12 PROCEDURE — 80048 BASIC METABOLIC PNL TOTAL CA: CPT

## 2018-02-12 PROCEDURE — 93005 ELECTROCARDIOGRAM TRACING: CPT

## 2018-02-12 PROCEDURE — 80202 ASSAY OF VANCOMYCIN: CPT

## 2018-02-12 PROCEDURE — C1877 STENT, NON-COAT/COV W/O DEL: HCPCS

## 2018-02-12 PROCEDURE — 96365 THER/PROPH/DIAG IV INF INIT: CPT

## 2018-02-12 PROCEDURE — 85014 HEMATOCRIT: CPT

## 2018-02-12 PROCEDURE — 86147 CARDIOLIPIN ANTIBODY EA IG: CPT

## 2018-02-12 PROCEDURE — 85610 PROTHROMBIN TIME: CPT

## 2018-02-12 PROCEDURE — 30233N1 TRANSFUSION OF NONAUTOLOGOUS RED BLOOD CELLS INTO PERIPHERAL VEIN, PERCUTANEOUS APPROACH: ICD-10-PCS | Performed by: EMERGENCY MEDICINE

## 2018-02-12 PROCEDURE — 84100 ASSAY OF PHOSPHORUS: CPT

## 2018-02-12 PROCEDURE — 02HV33Z INSERTION OF INFUSION DEVICE INTO SUPERIOR VENA CAVA, PERCUTANEOUS APPROACH: ICD-10-PCS | Performed by: INTERNAL MEDICINE

## 2018-02-12 PROCEDURE — 83010 ASSAY OF HAPTOGLOBIN QUANT: CPT

## 2018-02-12 PROCEDURE — 85060 BLOOD SMEAR INTERPRETATION: CPT

## 2018-02-12 PROCEDURE — 85025 COMPLETE CBC W/AUTO DIFF WBC: CPT

## 2018-02-12 PROCEDURE — 82140 ASSAY OF AMMONIA: CPT

## 2018-02-12 RX ADMIN — VANCOMYCIN HYDROCHLORIDE ONE MLS/HR: 1 INJECTION, SOLUTION INTRAVENOUS at 06:57

## 2018-02-12 RX ADMIN — Medication SCH ML: at 11:30

## 2018-02-12 RX ADMIN — THIAMINE HYDROCHLORIDE SCH MLS/HR: 100 INJECTION, SOLUTION INTRAMUSCULAR; INTRAVENOUS at 12:49

## 2018-02-12 RX ADMIN — STANDARDIZED SENNA CONCENTRATE AND DOCUSATE SODIUM SCH TAB: 8.6; 5 TABLET, FILM COATED ORAL at 09:00

## 2018-02-12 RX ADMIN — STANDARDIZED SENNA CONCENTRATE AND DOCUSATE SODIUM SCH TAB: 8.6; 5 TABLET, FILM COATED ORAL at 20:33

## 2018-02-12 RX ADMIN — Medication SCH ML: at 20:58

## 2018-02-12 RX ADMIN — ATORVASTATIN CALCIUM SCH MG: 20 TABLET, FILM COATED ORAL at 20:59

## 2018-02-12 RX ADMIN — VANCOMYCIN HYDROCHLORIDE ONE MLS/HR: 1 INJECTION, SOLUTION INTRAVENOUS at 03:45

## 2018-02-12 RX ADMIN — Medication SCH ML: at 10:07

## 2018-02-12 RX ADMIN — CHLORHEXIDINE GLUCONATE SCH PACK: 500 CLOTH TOPICAL at 19:29

## 2018-02-12 RX ADMIN — CHLORHEXIDINE GLUCONATE SCH PACK: 500 CLOTH TOPICAL at 04:00

## 2018-02-12 RX ADMIN — TAZOBACTAM SODIUM AND PIPERACILLIN SODIUM SCH MLS/HR: 250; 2 INJECTION, SOLUTION INTRAVENOUS at 17:29

## 2018-02-12 RX ADMIN — PANTOPRAZOLE SODIUM SCH MG: 40 INJECTION, POWDER, FOR SOLUTION INTRAVENOUS at 10:15

## 2018-02-12 RX ADMIN — TAZOBACTAM SODIUM AND PIPERACILLIN SODIUM SCH MLS/HR: 250; 2 INJECTION, SOLUTION INTRAVENOUS at 10:04

## 2018-02-12 RX ADMIN — TAZOBACTAM SODIUM AND PIPERACILLIN SODIUM SCH MLS/HR: 250; 2 INJECTION, SOLUTION INTRAVENOUS at 20:59

## 2018-02-12 RX ADMIN — PANTOPRAZOLE SODIUM SCH MG: 40 INJECTION, POWDER, FOR SOLUTION INTRAVENOUS at 20:59

## 2018-02-12 NOTE — HHI.CCPN
Subjective


Remarks/Hospital Course


72-year-old female presents from a nursing home for altered mental status.  The 

patient is nonverbal however his weight. As per the nursing home documentation 

patient had a temperature 101.5.  They had given her Tylenol 1 hour prior to 

coming to the emergency room.  Patient has a blood pressure of 90/50.  Given 

her nonverbal self difficult to get any history out of the patient.  She is 

accompanied by her daughters, who told me that she was just recently diagnosed 

with endometrial cancer from last Pap smear.





Subjective





2/12: Transfuse 4 units PRBCs.  Currently hemodynamically stable.  Remains 

encephalopathic.  Currently afebrile.





Objective





Vital Signs








  Date Time  Temp Pulse Resp B/P (MAP) Pulse Ox O2 Delivery O2 Flow Rate FiO2


 


2/12/18 08:00 99.0 97 20 99/59 (72) 100   














Intake and Output   


 


 2/12/18 2/12/18 2/13/18





 08:00 16:00 00:00


 


Intake Total 6450 ml 400 ml 


 


Output Total 1150 ml  


 


Balance 5300 ml 400 ml 








Result Diagram:  


2/12/18 0051 2/12/18 0051





Other Results





Microbiology








 Date/Time


Source Procedure


Growth Status


 


 


 2/12/18 00:51


Blood Peripheral Aerobic Blood Culture


Pending Received


 


 2/12/18 00:51


Blood Peripheral Anaerobic Blood Culture


Pending Received





 2/12/18 00:45


Blood Peripheral Aerobic Blood Culture


Pending Received


 


 2/12/18 00:45


Blood Peripheral Anaerobic Blood Culture


Pending Received


 


 2/12/18 00:51


Urine Catheterized Urine Urine Culture


Pending Worksheet








Imaging





Last Impressions








Head CT 2/12/18 0046 Signed





Impressions: 





 Service Date/Time:  Monday, February 12, 2018 01:17 - CONCLUSION:  No 





 significant change has occurred.       Elie Francois MD 


 


Chest X-Ray 2/12/18 0046 Signed





Impressions: 





 Service Date/Time:  Monday, February 12, 2018 01:00 - CONCLUSION: No acute 





 disease.       Elie Francois MD 


 


Abdomen/Pelvis CT 2/12/18 0046 Signed





Impressions: 





 Service Date/Time:  Monday, February 12, 2018 01:19 - CONCLUSION:  Stable 





 staghorn calculus on the right and nonobstructing left renal calculi as well 

as 





 bilateral renal masses. Calcified uterine fibroids. Diverticulosis without 





 diverticulitis. There is some air in the endometrial cavity which may be 

related 





 to recent instrumentation.     Elie Francois MD 








Objective Remarks


GENERAL: 72-year-old AA female currently resting in bed in no acute distress


SKIN: Focused skin assessment warm/dry.  No rash


HEAD: Atraumatic. Normocephalic. 


EYES: Pupils equal and round 3 mm bilaterally and reactive. No scleral icterus. 

No injection or drainage. 


ENT: No nasal bleeding or discharge.  Mucous membranes pink and moist.


NECK: Trachea midline. No JVD. 


CARDIOVASCULAR: Regular rate and rhythm S1, S2.  No S4.  No murmur. 


RESPIRATORY: No accessory muscle use. Clear to auscultation. Breath sounds 

equal bilaterally. 


GASTROINTESTINAL: Abdomen soft, non-tender, nondistended.  Hypoactive bowel 

sounds are appreciated


MUSCULOSKELETAL: No obvious deformities.  No significant peripheral edema. 


NEUROLOGICAL: Awake but nonverbal. No obvious cranial nerve deficits.  Motor 

grossly within normal limits.  Patient is noncommunicative


Urinary Catheter:  Yes


Assessment to:  Continue


Ortega insert reason:  Prolonged Immobilization





A/P


Assessment and Plan


Neuro/Psych:


 


Acute encephalopathy secondary to severe sepsis/urinary tract source


Chronic narcotic use





CT brain 2/12 revealed no acute intracranial findings


Acetaminophen 650 mg p.o. every 6 hours as needed fever/pain 1 through 5


Morphine sulfate 2 mg IV every 2 hours as needed pain 6-10


On hydrocodone/acetaminophen 5/325 1 tablet every 6 hours as needed pain 

chronically





CV: 





Severe sepsis


History of hypertension


Dyslipidemia





Holding amlodipine 5 mg daily lisinopril 20 daily light of hypertension/acute 

kidney injury.  Reasonable clinically indicated


Currently on atorvastatin 20 mg p.o. daily for dyslipidemia.  Continue


Received 4 units PRBCs.


Lactate 1.6


See infectious disease for antibiotic coverage





Resp: 





Nasal cannula to maintain saturations greater than or equal to 92%


Incentive spirometry  while awake





GI: 





Hypoalbuminemia


Diverticulosis





Currently n.p.o. except for medication


Pantoprazole 40 mg IV twice daily for GI prophylaxis


Docusate sodium/senna 1 tablet twice daily for bowel regimen





:  





Right staghorn calculus


Nonobstructing left nephrolithiasis





Maintain Ortega catheter


Urology evaluation





Endo:  





Hyperglycemia of critical illness





Sliding scale insulin Novulog with Accu-Cheks every 6 hours to maintain 

euglycemia/low regimen





Renal: 





Acute kidney injury


Bilateral renal masses





Avoid nephrotoxic drugs


Aggressive crystalloid hydration switched from normal saline at 124 cc an hour 

to half normal saline 84 cc an hour with elevated sodium


Monitor BMP daily.  Follow trends


Check urine eosinophils and electrolytes and creatinine





GYN:





Bilateral uterine fibroids


Endometrial cancer





Outpatient follow-up





Heme:





Microcytic anemia


Leukocytosis





Status post 4 units PRBCs


Monitor CBC daily.  Follow trends


Check coags


Check FE/TIBC/ferritin and reticulocyte count and peripheral smear





ID:





History of pansensitive Proteus mirabilis urinary tract infection





Currently Pipracil/tazobactam 2.25 g IV every 6 hours.  Received 1 dose of 

ceftriaxone in ED





Pertinent cultures





2/12 -blood cultures 2 -pending


2/12 -UA -pending





MSK:





PT evaluate and treat





FEN:





Hypernatremia





Switch IV fluids to half-normal saline at 84 cc an hour.  Recheck in a.m.





Access


-Peripheral IV.  Central if indicated





Prophylaxis


-GI -pantoprazole


-DVT -SCD/holding foreclosure prophylaxis in light of acute anemia.  Resume 

when clinically indicated





Level 2 follow-up











Haja Cox MD Feb 12, 2018 09:39

## 2018-02-12 NOTE — PD.RAD
Post Procedure Progress Note


Pre Procedure Diagnosis:  


(1) Staghorn renal calculus


(2) UTI (urinary tract infection)


Post Procedure Diagnosis:  


(1) UTI (urinary tract infection)


(2) Staghorn renal calculus


Procedure Date:


Feb 12, 2018


Supervising Radiologist:


Rakesh Lee


Proceduralist/Assist:  Brynn Fagan, RT(R), CB Perdomo, RT(R)(CV)


Anesthesia:  Local, Analgesia, Conscious Sedation


Plan of Activity


Patient to Unit:  [facility]


Patient Condition:  Fair


See PACS Report for procedural detail/treatment





Drainage Procedure


Procedure 1


Imaging Guidance:  Fluoroscopy


Side:  Right


Procedure Type:  Nephrostomy, Ureteral Stent


Procedure:  Placement


Italian:  8


PICC Line Length (cm):  28


Drainage:  Gravity drainage


Fluid Description:  Yellow


Findings:


Large right staghorn calculus











Rakesh Lee MD Feb 12, 2018 16:32

## 2018-02-12 NOTE — RADRPT
EXAM DATE/TIME:  02/12/2018 01:17 

 

[facility] COMPARISON:     

CT BRAIN W/O CONTRAST, January 19, 2018, 4:09.

 

 

INDICATIONS :     

Altered mental status.

                      

 

RADIATION DOSE:     

56.35 CTDIvol (mGy) ; Tabletop CT Head

 

 

 

MEDICAL HISTORY :     

Hypertension.  

 

SURGICAL HISTORY :      

Tubal ligation. 

 

ENCOUNTER:      

Initial

 

ACUITY:      

1 day

 

PAIN SCALE:      

Non-responsive

 

LOCATION:        

cranial 

 

TECHNIQUE:     

Multiple contiguous axial images were obtained of the head.  Using automated exposure control and adj
ustment of the mA and/or kV according to patient size, radiation dose was kept as low as reasonably a
chievable to obtain optimal diagnostic quality images.   DICOM format image data is available electro
nically for review and comparison.  

 

FINDINGS:     

There is mild atrophy. No hemorrhage, infarct, or mass. No fractures.

 

CONCLUSION:     

No significant change has occurred.  

 

 

 

 Elie Francois MD on February 12, 2018 at 1:23           

Board Certified Radiologist.

 This report was verified electronically.

## 2018-02-12 NOTE — RADRPT
EXAM DATE/TIME:  02/12/2018 01:00 

 

[facility] COMPARISON:     

No previous studies available for comparison.

 

                     

INDICATIONS :     

Short of breath.

                     

 

MEDICAL HISTORY :     

None.          

 

SURGICAL HISTORY :     

None.   

 

ENCOUNTER:     

Initial                                        

 

ACUITY:     

1 day      

 

PAIN SCORE:     

0/10

 

LOCATION:     

Bilateral  chest

 

FINDINGS:     

A single view of the chest demonstrates the lungs to be symmetrically aerated without evidence of mas
s, infiltrate or effusion.  The cardiomediastinal contours are unremarkable.  Osseous structures are 
intact.

 

CONCLUSION:     No acute disease.  

 

 

 

 Elie Francois MD on February 12, 2018 at 1:10           

Board Certified Radiologist.

 This report was verified electronically.

## 2018-02-12 NOTE — RADRPT
EXAM DATE/TIME:  02/12/2018 01:19 

 

[facility] COMPARISON:     

CT ABDOMEN & PELVIS W CONTRAST, January 20, 2018, 15:46.

 

 

INDICATIONS :     

Diffuse abdominal pain.

                  

 

ORAL CONTRAST:      

No oral contrast ingested.

                  

 

RADIATION DOSE:     

8.50 CTDIvol (mGy) 

 

 

MEDICAL HISTORY :     

Hypertension.  

 

SURGICAL HISTORY :      

Tubal ligation. 

 

ENCOUNTER:      

Initial

 

ACUITY:      

1 day

 

PAIN SCALE:      

Non-responsive

 

LOCATION:       

Bilateral  abdomen

 

TECHNIQUE:     

Volumetric scanning of the abdomen and pelvis was performed.  Using automated exposure control and ad
justment of the mA and/or kV according to patient size, radiation dose was kept as low as reasonably 
achievable to obtain optimal diagnostic quality images.  DICOM format image data is available electro
nically for review and comparison.  

 

FINDINGS:     

Spleen, liver, gallbladder unremarkable. There is a cyst in the right lobe of the liver posteriorly w
hich is unchanged. Left adrenal gland unremarkable. Stable right adrenal mass. Large staghorn calculu
s right kidney unchanged. Left renal cysts are unchanged. There are nonobstructing left renal calculi
 which are stable. The uterus is enlarged with numerous calcified fibroids. Ortega catheter is noted w
ithin a decompressed urinary bladder. Air is also seen within the endometrial cavity. At the midpole 
of the left kidney and indeterminate mass is again seen measuring 4.2 x 3.9 cm and 31 Hounsfield unit
s. Diverticulosis without diverticulitis. Fat-containing hernia. Osseous structures are intact. Lung 
bases are clear. Scattered atherosclerotic calcifications are noted. There is a hyperdense mass of th
e right midpole kidney anteriorly measuring 2.6 cm characteristic of a hemorrhagic or proteinaceous c
yst.

 

CONCLUSION:     

Stable staghorn calculus on the right and nonobstructing left renal calculi as well as bilateral enma
l masses.

Calcified uterine fibroids. Diverticulosis without diverticulitis. There is some air in the endometri
al cavity which may be related to recent instrumentation. 

 

 

 Elie Francois MD on February 12, 2018 at 1:26           

Board Certified Radiologist.

 This report was verified electronically.

## 2018-02-12 NOTE — PD.ID.CON
History of Present Illness


Service


ID


Consult Requested By





Reason for Consult


Evaluation and Mment of Sepsis, Pyelonephritis with stone.


Primary Care Physician


Juancho Rsoales DO


Diagnoses:  


History of Present Illness


Ms. Park is a 72-year-old female with past medical history significant for 

hypertension, dyslipidemia, who is a nursing home resident with unknown 

baseline mentation.  Patient was transferred from the nursing home for 

worsening mental status.  Reportedly there is a temperature 101.5 at the 

nursing home and due to persistent fever and hypotension patient was 

transferred to the hospital.  On arrival her blood pressure was 90/50 she has 

received approximately 4 L of IV fluid boluses so far.  Overnight she did not 

need any pressors and has remained on room air.





Urology has seen the patient and has recommended interventional radiology 

guided percutaneous nephrostomy tube placement in view of large staghorn 

calculus.  Infectious disease is consulted for evaluation and management of 

sepsis and pyelonephritis in a patient with Staghorn Calculus.





Review of Systems


ROS Limitations:  Clinical Condition, Poor Historian





Past Family Social History


Allergies:  


Coded Allergies:  


     No Known Allergies (Unverified  Allergy, Unknown, 1/19/18)


Past Medical History


Coronary artery disease


Hypercholesterolemia


History of recurrent UTIs


Pyelonephritis


Hypertension


Past Surgical History


Tubal ligation


Reported Medications





Reported Meds & Active Scripts


Active


Gnp Senna Plus 8.6-50 mg (Sennosides-Docusate Sodium) 8.6 Mg-50 Mg Tab 1 Tab PO 

BID


Norco (Hydrocodone-Acetaminophen) 5 Mg-325 Mg Tab 1 Tab PO Q4H PRN


Reported


Zofran Odt (Ondansetron Odt) 4 Mg Tab 4 Mg SL Q8HR PRN


Lisinopril 20 Mg Tab 20 Mg PO DAILY


Atorvastatin (Atorvastatin Calcium) 20 Mg Tab 20 Mg PO HS


Amlodipine (Amlodipine Besylate) 5 Mg Tab 5 Mg PO DAILY


Active Ordered Medications





Current Medications








 Medications


  (Trade)  Dose


 Ordered  Sig/[place]


 Route  Start Time


 Stop Time Status Last Admin


 


 Sodium Chloride  250 ml @ 


 15 mls/hr  ONCE  ONCE


 IV  2/12/18 01:45


 2/12/18 18:24  2/12/18 01:45


 


 


  (Lipitor)  20 mg  HS


 PO  2/12/18 21:00


     


 


 


  (Tylenol)  650 mg  Q6H  PRN


 PO  2/12/18 02:45


     


 


 


  (Morphine Inj)  2 mg  Q2H  PRN


 IV PUSH  2/12/18 02:45


     


 


 


  (Zofran Inj)  4 mg  Q6H  PRN


 IV PUSH  2/12/18 02:45


     


 


 


 Miscellaneous


 Information  1  Q361D


 XX  2/12/18 02:45


     


 


 


  (Chlorhexidine


 2% Cloth)  3 pack


 Taper  DAILY@04


 TOP  2/12/18 04:00


 2/8/19 03:59   


 


 


  (Chlorhexidine


 2% Cloth)  3 pack  UNSCH  PRN


 TOP  2/12/18 02:45


     


 


 


  (Kaylee-Colace)  1 tab  BID


 PO  2/12/18 09:00


     


 


 


  (Milk Of


 Magnesia Liq)  30 ml  Q12H  PRN


 PO  2/12/18 02:45


     


 


 


  (Senokot)  17.2 mg  Q12H  PRN


 PO  2/12/18 02:45


     


 


 


  (Dulcolax Supp)  10 mg  DAILY  PRN


 RECTAL  2/12/18 02:45


     


 


 


  (Lactulose Liq)  30 ml  DAILY  PRN


 PO  2/12/18 02:45


     


 


 


  (NS Flush)  2 ml  UNSCH  PRN


 IV FLUSH  2/12/18 02:45


     


 


 


  (NS Flush)  2 ml  BID


 IV FLUSH  2/12/18 09:00


    2/12/18 10:07


 


 


 Pharmacy Profile


 Note  0 ml @ 0


 mls/hr  UNSCH


 OTHER  2/12/18 02:45


     


 


 


 Piperacillin Sod/


 Tazobactam Sod  50 ml @ 


 100 mls/hr  Q6H


 IV  2/12/18 04:00


    2/12/18 10:04


 


 


 Sodium Chloride  1,000 ml @ 


 84 mls/hr  [id number]


 IV  2/12/18 10:00


    2/12/18 12:49


 


 


  (Protonix Inj)  40 mg  Q12HR


 IV PUSH  2/12/18 10:15


     


 


 


  (NS Flush)    DAILY


 IV FLUSH  2/12/18 11:30


    2/12/18 11:30


 


 


  (NS Flush)    UNSCH  PRN


 IV FLUSH  2/12/18 11:30


     


 


 


  (Albuterol Neb)  2.5 mg  Q2HR NEB  PRN


 NEB  2/12/18 13:45


     


 








Family History


Could not be obtained


Social History


Resident of nursing home.  Rest of the details could not be obtained.





Physical Exam


Vital Signs





Vital Signs








  Date Time  Temp Pulse Resp B/P (MAP) Pulse Ox O2 Delivery O2 Flow Rate FiO2


 


2/12/18 12:00  96      


 


2/12/18 12:00 98.8 84 19 108/51 (70) 100   


 


2/12/18 10:00  98      


 


2/12/18 08:00  98      


 


2/12/18 08:00 99.0 97 20 99/59 (72) 100   


 


2/12/18 07:07 98.9 100 15 101/57 100   


 


2/12/18 07:00      Room Air  100


 


2/12/18 06:41 98.9 105 20 100/55 100   


 


2/12/18 06:22 99.5 103 22 99/57 100   


 


2/12/18 06:00  102      


 


2/12/18 05:00  104 24 108/59 (75) 100   


 


2/12/18 04:51 98.5 106 24 100/53 100   


 


2/12/18 04:00 98.9 106 23 85/48 (60) 100   


 


2/12/18 04:00  106      


 


2/12/18 03:06        


 


2/12/18 02:44  105 14 90/53 (65) 98   


 


2/12/18 00:55   14  99   


 


2/12/18 00:47 98.6 104 18 90/55 (67) 99   








Physical Exam


GENERAL: This is a well-nourished, well-developed patient, in no apparent 

distress.


SKIN: No rashes, ecchymoses or lesions. Cool and dry.


HEAD: Atraumatic. Normocephalic. No temporal or scalp tenderness.


EYES: Pupils equal round and reactive. Extraocular motions intact. No scleral 

icterus. No injection or drainage. 


ENT: Nose without bleeding, purulent drainage or septal hematoma. Throat 

without erythema, tonsillar hypertrophy or exudate. Uvula midline. Airway 

patent.


NECK: Trachea midline. Supple, nontender, no meningeal signs.


CARDIOVASCULAR: Heart sounds audible.


RESPIRATORY: Clear to auscultation. Breath sounds equal bilaterally. No wheezes

, rales, or rhonchi.  


GASTROINTESTINAL: Abdomen soft, non-tender, nondistended. 


MUSCULOSKELETAL: Extremities without clubbing, cyanosis, or edema. 


NEUROLOGICAL: Awake and alert. ?  Orientation.  Moves all 4 extremities 

although weak.


Psych cooperative


IV line sites with no evidence of infection.


Laboratory





Laboratory Tests








Test


  2/12/18


00:51 2/12/18


00:57 2/12/18


03:30 2/12/18


10:02


 


White Blood Count 34.7    


 


Red Blood Count 2.44    


 


Hemoglobin 6.0    


 


Hematocrit 19.2    


 


Mean Corpuscular Volume 78.6    


 


Mean Corpuscular Hemoglobin 24.8    


 


Mean Corpuscular Hemoglobin


Concent 31.5 


  


  


  


 


 


Red Cell Distribution Width 19.6    


 


Platelet Count 393    


 


Mean Platelet Volume 7.5    


 


Neutrophils (%) (Auto) 88.6    


 


Lymphocytes (%) (Auto) 5.7    


 


Monocytes (%) (Auto) 5.5    


 


Eosinophils (%) (Auto) 0.1    


 


Basophils (%) (Auto) 0.1    


 


Neutrophils # (Auto) 30.7    


 


Lymphocytes # (Auto) 2.0    


 


Monocytes # (Auto) 1.9    


 


Eosinophils # (Auto) 0.0    


 


Basophils # (Auto) 0.1    


 


CBC Comment AUTO DIFF    


 


Differential Total Cells


Counted 100 


  


  


  


 


 


Neutrophils % (Manual) 72    


 


Band Neutrophils % 16    


 


Lymphocytes % 5    


 


Monocytes % 7    


 


Neutrophils # (Manual) 30.5    


 


Differential Comment


  FINAL DIFF


MANUAL 


  


  


 


 


Toxic Granulation 1+    


 


Platelet Estimate NORMAL    


 


Platelet Morphology Comment NORMAL    


 


Blood Smear Pathologist Review     


 


Reticulocyte Count 5.3    


 


Absolute Reticulocyte Count 133.1    


 


Urine Color YELLOW    


 


Urine Turbidity CLOUDY    


 


Urine pH 8.0    


 


Urine Specific Gravity 1.012    


 


Urine Protein 100    


 


Urine Glucose (UA) NEG    


 


Urine Ketones NEG    


 


Urine Occult Blood TRACE    


 


Urine Nitrite NEG    


 


Urine Bilirubin NEG    


 


Urine Urobilinogen LESS THAN 2.0    


 


Urine Leukocyte Esterase LARGE    


 


Urine RBC 10    


 


Urine WBC 96    


 


Urine WBC Clumps FEW    


 


Urine Squamous Epithelial


Cells 13 


  


  


  


 


 


Urine Amorphous Sediment RARE    


 


Urine Bacteria MANY    


 


Urine Hyaline Casts 25    


 


Urine Granular Casts 7    


 


Urine Mucus FEW    


 


Microscopic Urinalysis Comment


  CATH-CULTURE


IND 


  


  


 


 


Blood Urea Nitrogen 61    


 


Creatinine 1.95    


 


Random Glucose 113    


 


Total Protein 5.8    


 


Albumin 1.6    


 


Calcium Level 8.1    


 


Alkaline Phosphatase 98    


 


Aspartate Amino Transf


(AST/SGOT) 13 


  


  


  


 


 


Alanine Aminotransferase


(ALT/SGPT) 12 


  


  


  


 


 


Total Bilirubin 0.3    


 


Sodium Level 151    


 


Potassium Level 4.1    


 


Chloride Level 121    


 


Carbon Dioxide Level 21.2    


 


Anion Gap 9    


 


Estimat Glomerular Filtration


Rate 30 


  


  


  


 


 


Lactic Acid Level  1.6   1.2 


 


Nasal Screen MRSA (PCR)


  


  


  MRSA NOT


DETECTED 


 


 


Ammonia    24 


 


Test


  2/12/18


10:24 2/12/18


12:36 


  


 


 


Hemoglobin 10.0  10.4   


 


Hematocrit 31.1  31.2   


 


Prothrombin Time  11.7   


 


Prothromb Time International


Ratio 


  1.2 


  


  


 


 


Activated Partial


Thromboplast Time 


  21.5 


  


  


 


 


Iron Level  58   


 


Total Iron Binding Capacity  168   


 


Percent Iron Saturation  34.5   


 


Ferritin  2835   














 Date/Time


Source Procedure


Growth Status


 


 


 2/12/18 10:24


Blood Peripheral Aerobic Blood Culture


Pending Received


 


 2/12/18 10:24


Blood Peripheral Anaerobic Blood Culture


Pending Received


 


 2/12/18 00:51


Urine Catheterized Urine Urine Culture


Pending Worksheet








Result Diagram:  


2/12/18 1236                                                                   

             2/12/18 0051





Imaging





Last Impressions








Chest X-Ray 2/12/18 1124 Signed





Impressions: 





 Service Date/Time:  Monday, February 12, 2018 11:39 - CONCLUSION:  1. Left IJ 





 central line tip in the very proximal SVC. No pneumothorax. 2. Minimal 

bibasilar 





 airspace disease, likely atelectasis.     Mario Del Rosario MD 


 


Head CT 2/12/18 0046 Signed





Impressions: 





 Service Date/Time:  Monday, February 12, 2018 01:17 - CONCLUSION:  No 





 significant change has occurred.       Elie Francois MD 


 


Abdomen/Pelvis CT 2/12/18 0046 Signed





Impressions: 





 Service Date/Time:  Monday, February 12, 2018 01:19 - CONCLUSION:  Stable 





 staghorn calculus on the right and nonobstructing left renal calculi as well 

as 





 bilateral renal masses. Calcified uterine fibroids. Diverticulosis without 





 diverticulitis. There is some air in the endometrial cavity which may be 

related 





 to recent instrumentation.     Elie Francois MD 











Assessment and Plan


Assessment and Plan


Severe sepsis present on admission


History of Proteus UTI reportedly pan sensitive prior to admission


Complicated UTI: Staghorn calculus with pyelonephritis, obstructive uropathy.


High grade leucocytosis.


Hypertension


Dyslipidemia


Acute metabolic encephalopathy: Infection, ?  Baseline unknown





Recommendations:


Continue Zosyn IV


RN informs me urology's planning on Nephrostomy possibly.


Follow cultures


Follow clinically.


Sandra Fine MD Feb 12, 2018 13:46

## 2018-02-12 NOTE — RADRPT
EXAM DATE/TIME:  02/12/2018 15:40 

 

[facility] COMPARISON:  

No previous studies available for comparison.

                        

 

INDICATIONS :      

Patient with  right side kidney stone.

                        

 

MEDICAL HISTORY :     

Hypertension, dyslopidemia, pyelonephritis, septic shock, UTI, anemia

 

SURGICAL HISTORY :     

Tubal ligation

 

ENCOUNTER:     

Initial

 

ACUITY:     

2 days

 

PAIN SCORE:     

Non-responsive

                        

                        

 

FLUORO TIME:     

1 minutes

 

IMAGE SERIES:      

2

 

SEDATION TIME:     

30 minutes

 

CONTRAST:     

15 cc Omnipaque (iohexol) 350 

 

MEDICATION(S):      

1.)  4 mg midazolam (Versed)  IV     

2.)  100 mcg fentanyl (Sublimaze)  IV     

      

      

 

DEVICE(S):      

1.)  8 Australian 8x28 Expel Neph-ureteral       

 

 

PROCEDURE :     

1. fluoroscopic guided puncture of the kidney.

2.  Antegrade percutaneous pyelogram.

3.  Percutaneous nephroureteral stent placement.

4.  Conscious sedation with continuous EKG and oximetry monitoring.

 

The risks, benefits and alternatives to the procedure were explained and verbal and written consent w
as obtained.  The site was prepped in sterile fashion.  Full sterile technique was used, including ca
p, mask, sterile gloves and gown and a large sterile sheet.  Hand hygiene and 2% chlorhexidine and/or
 betadine/alcohol prep was utilized per protocol for cutaneous antisepsis.  Sterile gel and sterile p
robe cover were utilized for ultrasound guidance.  The skin and subcutaneous tissues were infiltrated
 with local anesthetic solution.  

 

Prior CT images were reviewed demonstrating an extensive right-sided staghorn calculus. An upper post
erior calyx was selected. Also noted was extensive calcified fibroids.

 

With fluoroscopic guidance the selected kidney was punctured and a percutaneous antegrade pyelogram w
as performed demonstrating a large staghorn calculus in a relatively nondilated collecting system.  H
ockey-stick catheter and Glidewire were manipulated past the stone and into the tortuous proximal ure
ter which was a complete loop near the UPJ. Serial dilatation was performed and the prescribed nephro
ureteral stent was placed with the proximal portion within the renal pelvis and the distal extent in 
the urinary bladder.  Injection of positive contrast demonstrates good position of the catheter.

 

Conscious sedation was performed with the prescribed dosages and duration as above in the presence of
 an independent trained radiology nurse to assist in the monitoring of the patient.  EKG and oximetry
 remained stable throughout the procedure.  The patient tolerated the procedure well and there were n
o complications.  The patient was sent to post anesthesia recovery in stable condition.

 

CONCLUSION:

1. Large right-sided staghorn calculus.     

2. Uncomplicated nephroureteral stent placement as above.

3. Upper posterior pole calyx was selected and accessed in anticipation of percutaneous nephrolithoto
my.

4. Extensive calcified uterine fibroids

 

 

 

 Rakesh Lee MD on February 12, 2018 at 17:23           

Board Certified Radiologist.

 This report was verified electronically.

## 2018-02-12 NOTE — RADRPT
EXAM DATE/TIME:  02/12/2018 11:39 

 

[facility] COMPARISON:     

CHEST SINGLE AP, February 12, 2018, 1:00.

 

                     

INDICATIONS :     

Central line placement.

                     

 

MEDICAL HISTORY :     

Hypertension.          

 

SURGICAL HISTORY :     

Tubal ligation.   

 

ENCOUNTER:     

Initial                                        

 

ACUITY:     

1 day      

 

PAIN SCORE:     

Non-responsive.

 

LOCATION:     

Bilateral chest 

 

FINDINGS:     

Interval placement of left IJ central line with tip in the very proximal SVC. No significant pneumoth
orax. Minimal bibasilar airspace disease. Cardiomediastinal contours are stable. Remainder of the exa
m is unchanged.

 

CONCLUSION:     

1. Left IJ central line tip in the very proximal SVC. No pneumothorax.

2. Minimal bibasilar airspace disease, likely atelectasis. 

 

 

 Mario Del Rosario MD on February 12, 2018 at 11:57           

Board Certified Radiologist.

 This report was verified electronically.

## 2018-02-12 NOTE — PD
HPI


Chief Complaint:  altered mental status


Time Seen by Provider:  00:43


Travel History


International Travel<30 days:  No


Contact w/Intl Traveler<30days:  No


Traveled to known affect area:  No





History of Present Illness


HPI


A 72-year-old female came to the emergency room brought by EMS from nursing 

home for altered mental status.  I'm unaware of patient's baseline mental 

status.  Her eyes are open and seems like she tries to communicate but she is 

nonverbal at this point.  As per the nursing home documentation patient had a 

temperature 101.5.  They had given her Tylenol 1 hour prior to coming to the 

emergency room.  Patient has a blood pressure of 90/50.  Given her nonverbal 

self difficult to get any history out of the patient.





PFSH


Past Medical History


*** Narrative Medical


List of her past medical, surgical, social and family history is reviewed from 

the nursing note.


Arthritis:  No


Heart Rhythm Problems:  No


Cancer:  No


Cardiovascular Problems:  Yes


High Cholesterol:  Yes


Chest Pain:  No


Congestive Heart Failure:  No


Diabetes:  No


Diminished Hearing:  No


Endocrine:  No


Genitourinary:  Yes


Hypertension:  Yes


Immune Disorder:  No


Kidney Stones:  No


Musculoskeletal:  Yes


Neurologic:  No


Psychiatric:  No


Reproductive:  No


Respiratory:  No


Renal Failure:  No


Thyroid Disease:  No


Menopausal:  Yes


Tubal Ligation:  Yes





Past Surgical History


Abdominal Surgery:  No


Cardiac Surgery:  No


Endocrine Surgery:  No


Genitourinary Surgery:  No


Gynecologic Surgery:  Yes (tubes tied)


Oral Surgery:  No


Thoracic Surgery:  No





Social History


Alcohol Use:  No


Tobacco Use:  No


Substance Use:  No





Allergies-Medications


(Allergen,Severity, Reaction):  


Coded Allergies:  


     No Known Allergies (Unverified  Allergy, Unknown, 1/19/18)


Comments


No known drug allergies.


Reported Meds & Prescriptions





Reported Meds & Active Scripts


Active


Gnp Senna Plus 8.6-50 mg (Sennosides-Docusate Sodium) 8.6 Mg-50 Mg Tab 1 Tab PO 

BID


Norco (Hydrocodone-Acetaminophen) 5 Mg-325 Mg Tab 1 Tab PO Q4H PRN


Reported


Zofran Odt (Ondansetron Odt) 4 Mg Tab 4 Mg SL Q8HR PRN


Lisinopril 20 Mg Tab 20 Mg PO DAILY


Atorvastatin (Atorvastatin Calcium) 20 Mg Tab 20 Mg PO HS


Amlodipine (Amlodipine Besylate) 5 Mg Tab 5 Mg PO DAILY





Narrative Medication


List of her home medications reviewed from the nursing note.





Review of Systems


ROS Limitations:  Altered Mental Status


Except as stated in HPI:  all other systems reviewed are Neg


General / Constitutional:  Positive: Fever





Physical Exam


Narrative


GENERAL: Eyes open but nonverbal, moderate distress, followed up from the 

urogenital area


SKIN: Focused skin assessment warm/dry.


HEAD: Atraumatic. Normocephalic. 


EYES: Pupils equal and round. No scleral icterus. No injection or drainage. 


ENT: No nasal bleeding or discharge.  Mucous membranes pink and moist.


NECK: Trachea midline. No JVD. 


CARDIOVASCULAR: Regular rate and rhythm.  No murmur appreciated.


RESPIRATORY: No accessory muscle use. Clear to auscultation. Breath sounds 

equal bilaterally. 


GASTROINTESTINAL: Abdomen soft, non-tender, nondistended. Hepatic and splenic 

margins not palpable. 


MUSCULOSKELETAL: No obvious deformities. No clubbing.  No cyanosis.  No edema. 


NEUROLOGICAL: Awake but nonverbal. No obvious cranial nerve deficits.  Motor 

grossly within normal limits.  Patient is noncommunicative


PSYCHIATRIC: Appropriate mood and affect; insight and judgment normal.





Data


Data


Last Documented VS





Vital Signs








  Date Time  Temp Pulse Resp B/P (MAP) Pulse Ox O2 Delivery O2 Flow Rate FiO2


 


2/12/18 00:55   14  99   


 


2/12/18 00:47 98.6 104  90/55 (67)    








Orders





 Orders


Sepsis Workup Initiated (2/12/18 )


Complete Blood Count With Diff (2/12/18 00:46)


Comprehensive Metabolic Panel (2/12/18 00:46)


Lactic Acid Sepsis Protocol (2/12/18 00:46)


Urinalysis - C+S If Indicated (2/12/18 00:46)


Blood Culture (2/12/18 00:46)


Chest, Single Ap (2/12/18 00:46)


Blood Glucose (2/12/18 00:46)


Ecg Monitoring (2/12/18 00:46)


Iv Access Insert/Monitor (2/12/18 00:46)


Oximetry (2/12/18 00:46)


Oxygen Administration (2/12/18 00:46)


Ct Abd/Pel W/O Iv Contrast (2/12/18 00:46)


Ct Brain W/O Iv Contrast(Rout) (2/12/18 00:46)


Sodium Chlor 0.9% 1000 Ml Inj (Ns 1000 M (2/12/18 01:00)


Sodium Chlor 0.9% 1000 Ml Inj (Ns 1000 M (2/12/18 01:00)


Urine Culture (2/12/18 00:51)


Ceftriaxone Inj (Rocephin Inj) (2/12/18 01:30)


Type And Screen (2/12/18 01:34)


Red Blood Cells (Rbc) (2/12/18 01:34)


Blood Product Administration (2/12/18 01:34)


Sodium Chlor 0.9% 250 Ml Inj (Ns 250 Ml (2/12/18 01:45)


Admit Order (Ed Use Only) (2/12/18 02:02)





Labs





Laboratory Tests








Test


  2/12/18


00:51 2/12/18


00:57


 


White Blood Count 34.7 TH/MM3  


 


Red Blood Count 2.44 MIL/MM3  


 


Hemoglobin 6.0 GM/DL  


 


Hematocrit 19.2 %  


 


Mean Corpuscular Volume 78.6 FL  


 


Mean Corpuscular Hemoglobin 24.8 PG  


 


Mean Corpuscular Hemoglobin


Concent 31.5 % 


  


 


 


Red Cell Distribution Width 19.6 %  


 


Platelet Count 393 TH/MM3  


 


Mean Platelet Volume 7.5 FL  


 


Neutrophils (%) (Auto) 88.6 %  


 


Lymphocytes (%) (Auto) 5.7 %  


 


Monocytes (%) (Auto) 5.5 %  


 


Eosinophils (%) (Auto) 0.1 %  


 


Basophils (%) (Auto) 0.1 %  


 


Neutrophils # (Auto) 30.7 TH/MM3  


 


Lymphocytes # (Auto) 2.0 TH/MM3  


 


Monocytes # (Auto) 1.9 TH/MM3  


 


Eosinophils # (Auto) 0.0 TH/MM3  


 


Basophils # (Auto) 0.1 TH/MM3  


 


CBC Comment AUTO DIFF  


 


Differential Total Cells


Counted 100 


  


 


 


Neutrophils % (Manual) 72 %  


 


Band Neutrophils % 16 %  


 


Lymphocytes % 5 %  


 


Monocytes % 7 %  


 


Neutrophils # (Manual) 30.5 TH/MM3  


 


Differential Comment


  FINAL DIFF


MANUAL 


 


 


Toxic Granulation 1+  


 


Platelet Estimate NORMAL  


 


Platelet Morphology Comment NORMAL  


 


Blood Smear Pathologist Review   


 


Reticulocyte Count 5.3 %  


 


Absolute Reticulocyte Count 133.1 MIL/L  


 


Urine Color YELLOW  


 


Urine Turbidity CLOUDY  


 


Urine pH 8.0  


 


Urine Specific Gravity 1.012  


 


Urine Protein 100 mg/dL  


 


Urine Glucose (UA) NEG mg/dL  


 


Urine Ketones NEG mg/dL  


 


Urine Occult Blood TRACE  


 


Urine Nitrite NEG  


 


Urine Bilirubin NEG  


 


Urine Urobilinogen


  LESS THAN 2.0


MG/DL 


 


 


Urine Leukocyte Esterase LARGE  


 


Urine RBC 10 /hpf  


 


Urine WBC 96 /hpf  


 


Urine WBC Clumps FEW  


 


Urine Squamous Epithelial


Cells 13 /hpf 


  


 


 


Urine Amorphous Sediment RARE  


 


Urine Bacteria MANY /hpf  


 


Urine Hyaline Casts 25 /lpf  


 


Urine Granular Casts 7 /lpf  


 


Urine Mucus FEW /lpf  


 


Microscopic Urinalysis Comment


  CATH-CULTURE


IND 


 


 


Urine Random Creatinine 71.2 MG/DL  


 


Urine Random Sodium 40 MEQ/L  


 


Blood Urea Nitrogen 61 MG/DL  


 


Creatinine 1.95 MG/DL  


 


Random Glucose 113 MG/DL  


 


Total Protein 5.8 GM/DL  


 


Albumin 1.6 GM/DL  


 


Calcium Level 8.1 MG/DL  


 


Alkaline Phosphatase 98 U/L  


 


Aspartate Amino Transf


(AST/SGOT) 13 U/L 


  


 


 


Alanine Aminotransferase


(ALT/SGPT) 12 U/L 


  


 


 


Total Bilirubin 0.3 MG/DL  


 


Sodium Level 151 MEQ/L  


 


Potassium Level 4.1 MEQ/L  


 


Chloride Level 121 MEQ/L  


 


Carbon Dioxide Level 21.2 MEQ/L  


 


Anion Gap 9 MEQ/L  


 


Estimat Glomerular Filtration


Rate 30 ML/MIN 


  


 


 


Lactic Acid Level  1.6 mmol/L 











MDM


Medical Decision Making


Medical Screen Exam Complete:  Yes


Emergency Medical Condition:  Yes


Medical Record Reviewed:  Yes


Interpretation(s)


Twelve-lead EKG was reviewed by me.  Normal sinus rhythm, normal axis, sinus 

tachycardia, motion artifact.  Heart rate of 105 bpm.


Differential Diagnosis


Sepsis, UTI, pneumonia, electrolyte abnormality


Narrative Course


1:46 AM patient had a Ortega catheter inserted by the nurse who noticed that she 

had foul-smelling discharge coming from the urogenital area.  The urine that 

was coming out seemed extremely cloudy.  There is a high suspicion for UTI.  

Given patient's clinical appearance and blood pressure I ordered 2 L of IV 

fluid bolus from the start.  Blood test results are back.  Patient has 

significant leukocytosis.  Her hemoglobin and hematocrit is low as well.  

Patient was given IV Rocephin.  I was just told that her blood pressure 

continues to be in the 80s.  I will give the patient IV Zosyn and vancomycin.  

At this point she is probably a septic shock.  I discussed the case with the 

intensivist and he will admit the patient.


Critical Care Narrative


Aggregate critical care time was 60 minutes. Time to perform other separately 

billable procedures was not  


included in the critical care time. My time did not include minutes spent 

treating any other patients simultaneously or on  


activities that did not directly contribute to the patient's treatment.  





The services I provided to this patient were to treat and/or prevent clinically 

significant deterioration that could result  


in: Septic shock, UTI, hypernatremic dehydration, symptomatically anemia, blood 

transfusion, sepsis protocol





I provided critical care services requiring my management, as noted below:


Chart data review, documentation time, medication orders and management, vital 

sign assessments/reviewing monitor data,  


ordering and reviewing lab tests, ordering and interpreting/reviewing x-rays 

and diagnostic studies, care of the patient  


and discussion of the patient with the admitting physicians.





Procedures


EKG Prior to Arrival:  No





Physician Communication


Physician Communication


Dr. Shelby





Diagnosis





 Primary Impression:  


 Septic shock


 Additional Impressions:  


 UTI (urinary tract infection)


 Qualified Codes:  N39.0 - Urinary tract infection, site not specified


 Dehydration with hypernatremia


 Altered mental status


 Qualified Codes:  R40.1 - Stupor


 Symptomatic anemia





Admitting Information


Admitting Physician Requests:  Alex Cerna MD Feb 12, 2018 00:44

## 2018-02-12 NOTE — MB
cc:

DINESH MCKOY

****

 

 

DATE OF CONSULTATION

02/12/2018.

 

HISTORY OF PRESENT ILLNESS

This is a 72-year-old female who presented with an altered mental status and

fever.  She is a resident of a nursing home and, due to signs of sepsis, this

history will primarily be taken from the chart.  The patient with a blood

pressure of 90/50 and hemoglobin of 6.0 on admission.  Her creatinine was 1.95

and a CT scan of the abdomen and pelvis shows a large right-sided staghorn and

some very small nonobstructing left renal calculi with bilateral renal masses.

She also apparently has a history of endometrial cancer and had a recent

biopsy.

 

ALLERGIES

She has no known drug allergies.

 

MEDICAL HISTORY

1. Hypertension.

2. Hyperlipidemia.

3. Pyelonephritis.

 

PAST SURGICAL HISTORY

Tubal ligation.

Recent endometrial biopsy.

 

MEDICATIONS

Please refer to the chart.

 

FAMILY HISTORY, SOCIAL HISTORY

I am unable to obtain the family history or the social history due to her

current mental status.

 

REVIEW OF SYSTEMS

Unable to obtain due to the patient's mental status.

 

PHYSICAL EXAMINATION

Vitals:  Temperature is 99, heart rate 97, respiratory 29, 99/59 blood

pressure.  100% on room air.

General:  She is a 72-year-old black female in no acute distress. HEENT: :

Normocephalic, atraumatic.  Pupils equal, round, regular to light.  Extraocular

movements intact.

Neck:  Supple.

Heart Rate:  Regular rate and rhythm.

Lungs:  Clear.

Abdomen:  Soft.  Minimal right-sided tenderness is noted.  No rebound or

guarding is noted.

:  Normal female external genitalia with Ortega catheter in place.

Extremities:  No cyanosis, clubbing or edema.

Psych:  Generalized mood.

 

LABORATORY DATA

White count 34.7, hemoglobin 6.0, hematocrit 19.2, platelet count is 393.

Sodium 151, potassium 4.1, chloride 121, CO2 21.2, BUN of 61, creatinine 1.95,

glucose of 113.

Urinalysis shows a few clumps of white cells with 96 white cells and 10 red

cells.

Both urine and blood cultures are currently pending.

 

CHEST X-RAY

No evidence of active disease.

 

HEAD CT

No significant changes have occurred.

 

CT OF THE ABDOMEN

Staghorn calculus on the right, nonobstructing left renal calculi with

bilateral renal masses, calcified uterine fibroids,  diverticulosis without

diverticulitis, some areas noted in the endometrial cavity related to recent

biopsy.

 

ASSESSMENT

A 72-year-old female with sepsis and altered mental status with a large

staghorn calculus.  Sepsis most likely urinary in origin.  Recommend

interventional radiology consult for placement of right percutaneous

nephrostomy tube.

 

For now continue with IV antibiotics and IV fluids, transfuse PRBCs as

appropriate due to a hemoglobin of 6.

 

Thank you for the consult and allowing me to participate in the care of this

patient.

 

                              _________________________________

                              Dinesh RICKETTS/SSB

D:  2/12/2018/12:14 PM   T:  2/12/2018/12:22 PM

Visit #:  [id number]

Job #:  [id number]

## 2018-02-12 NOTE — PD.PROCEDR
Central Line Procedure


REASON FOR PROCEDURE


Central venous access





PROCEDURE PERFORMED


Central line placement: Left IJ CVL





CONSENT


Informed consent for procedure was obtained.  The risks and benefits of the 

procedure were discussed to include but limited to bleeding, clot formation, 

infection, and even death.





ANESTHESIA


Local injection of 1% Lidocaine





DESCRIPTION OF THE PROCEDURE


The patient was placed in supine, mild Trendelenburg position.  The area was 

exposed and cleansed with ChloraPrep, times two.  Large sterile drape was used 

to cover the patient, with the site exposed, under sterile conditions including 

cap, face mask, sterile gown, and sterile gloves.  On single attempt, the 

introducer needle was inserted with negative pressure in syringe and venous 

flash was obtained.  The guide wire was then advanced without any restriction 

and the needle was removed.  The dilator was used without any complications.  

Using Seldinger technique the antibiotic coated triple lumen catheter was 

advanced over the guide wire to a depth of 20 centimeters.  The guide wire was 

removed.  All ports were aspirated with dark venous blood return and flushed 

easily with sterile saline.  All ports were capped.  Antibiotic disc was placed 

around central line at puncture site.  The central line was secured to the skin 

with two interrupted 2.0 silk sutures.  The area was bandaged with sterile see-

through central line bandage.





RADIOLOGICAL DATA


Ultrasound guidance was used to locate left internal jugular vein.  Doppler/

color flow was used to confirm venous flow.





COMPLICATIONS:


No apparent complications





ESTIMATED BLOOD LOSS:


Less than 1 cc.











Haja Cox MD Feb 12, 2018 11:26

## 2018-02-12 NOTE — HHI.HP
HPI


Service


Critical Care Medicine


Primary Care Physician


Juancho Rosales, DO


Admission Diagnosis





septic shock, UTI, symptomatic anemia


Diagnosis:  


Travel History


International Travel<30 Days:  No


Contact w/Intl Traveler <30 Da:  No


Traveled to Known Affected Are:  No


History of Present Illness


72-year-old female presents from a nursing home for altered mental status.  The 

patient is nonverbal however his weight. As per the nursing home documentation 

patient had a temperature 101.5.  They had given her Tylenol 1 hour prior to 

coming to the emergency room.  Patient has a blood pressure of 90/50.  Given 

her nonverbal self difficult to get any history out of the patient.  She is 

accompanied by her daughters, who told me that she was just recently diagnosed 

with endometrial cancer from last Pap smear.





Review of Systems


ROS


Unobtainable patient is nonverbal





Past Family Social History


Allergies:  


Coded Allergies:  


     No Known Allergies (Unverified  Allergy, Unknown, 18)


Past Medical History


Hypertension


Dyslipidemia


Pyelonephritis


Past Surgical History


Tubal ligation


Reported Medications





Reported Meds & Active Scripts


Active


Gnp Senna Plus 8.6-50 mg (Sennosides-Docusate Sodium) 8.6 Mg-50 Mg Tab 1 Tab PO 

BID


Norco (Hydrocodone-Acetaminophen) 5 Mg-325 Mg Tab 1 Tab PO Q4H PRN


Reported


Zofran Odt (Ondansetron Odt) 4 Mg Tab 4 Mg SL Q8HR PRN


Lisinopril 20 Mg Tab 20 Mg PO DAILY


Atorvastatin (Atorvastatin Calcium) 20 Mg Tab 20 Mg PO HS


Amlodipine (Amlodipine Besylate) 5 Mg Tab 5 Mg PO DAILY


Active Ordered Medications





Current Medications








 Medications


  (Trade)  Dose


 Ordered  Sig/[place]


 Route


 PRN Reason  Start Time


 Stop Time Status Last Admin


Dose Admin


 


 Sodium Chloride  250 ml @ 


 15 mls/hr  ONCE  ONCE


 IV


   18 01:45


 18 18:24  18 01:45


 


 


 Atorvastatin


 Calcium


  (Lipitor)  20 mg  HS


 PO


   18 21:00


     


 


 


 Sodium Chloride  1,000 ml @ 


 124 mls/hr  Q8H4M


 IV


   18 02:33


     


 


 


 Acetaminophen


  (Tylenol)  650 mg  Q6H  PRN


 PO


 PAIN 1-5 AND/OR FEVER >101F  18 02:45


     


 


 


 Morphine Sulfate


  (Morphine Inj)  2 mg  Q2H  PRN


 IV PUSH


 PAIN SCALE 6 TO 10  18 02:45


     


 


 


 Famotidine


  (Pepcid Inj)  10 mg  Q12HR


 IV PUSH


   18 09:00


     


 


 


 Famotidine


  (Pepcid)  10 mg  Q12HR


 PO


   18 09:00


     


 


 


 Ondansetron HCl


  (Zofran Inj)  4 mg  Q6H  PRN


 IV PUSH


 NAUSEA OR VOMITING  18 02:45


     


 


 


 Albuterol/


 Ipratropium


  (Duoneb Neb)  1 ampule  Q2HR NEB  PRN


 INH


 WHEEZING  18 02:45


     


 


 


 Heparin Sodium


  (Porcine)


  (Heparin Inj)  5,000 units  Q8H


 SQ


   18 06:00


     


 


 


 Miscellaneous


 Information  1  Q361D


 XX


   18 02:45


     


 


 


 Chlorhexidine


 Gluconate


  (Chlorhexidine


 2% Cloth)  3 pack


 Taper  DAILY@04


 TOP


   18 04:00


 19 03:59   


 


 


 Chlorhexidine


 Gluconate


  (Chlorhexidine


 2% Cloth)  3 pack  UNSCH  PRN


 TOP


 HYGIENIC CARE  18 02:45


     


 


 


 Senna/Docusate


 Sodium


  (Kaylee-Colace)  1 tab  BID


 PO


   18 09:00


     


 


 


 Magnesium


 Hydroxide


  (Milk Of


 Magnesia Liq)  30 ml  Q12H  PRN


 PO


 Mild constipation  18 02:45


     


 


 


 Sennosides


  (Senokot)  17.2 mg  Q12H  PRN


 PO


 Moderate constipation  18 02:45


     


 


 


 Bisacodyl


  (Dulcolax Supp)  10 mg  DAILY  PRN


 RECTAL


 SEVERE CONSITIPATION  18 02:45


     


 


 


 Lactulose


  (Lactulose Liq)  30 ml  DAILY  PRN


 PO


 SEVERE CONSITIPATION  18 02:45


     


 


 


 Sodium Chloride


  (NS Flush)  2 ml  UNSCH  PRN


 IV FLUSH


 FLUSH AFTER USING IV ACCESS  18 02:45


     


 


 


 Sodium Chloride


  (NS Flush)  2 ml  BID


 IV FLUSH


   18 09:00


     


 


 


 Pharmacy Profile


 Note  0 ml @ 0


 mls/hr  UNSCH


 OTHER


   18 02:45


     


 


 


 Piperacillin Sod/


 Tazobactam Sod  50 ml @ 


 100 mls/hr  Q6H


 IV


   18 04:00


     


 








Family History


Unable to obtain due to patient's mental status.


Social History


No documented history of tobacco, alcohol, or illicit drug abuse





Physical Exam


Vital Signs





Vital Signs








  Date Time  Temp Pulse Resp B/P (MAP) Pulse Ox O2 Delivery O2 Flow Rate FiO2


 


18 03:06        


 


18 02:44  105 14 90/53 (65) 98   


 


18 00:55   14  99   


 


18 00:47 98.6 104 18 90/55 (67) 99   








Physical Exam


GENERAL: Elderly appearing woman in moderate distress.  Eyes open but nonverbal

, moderate distress, foul-smelling order up from the urogenital area


SKIN: Focused skin assessment warm/dry.


HEAD: Atraumatic. Normocephalic. 


EYES: Pupils equal and round. No scleral icterus. No injection or drainage. 


ENT: No nasal bleeding or discharge.  Mucous membranes pink and moist.


NECK: Trachea midline. No JVD. 


CARDIOVASCULAR: Regular rate and rhythm.  No murmur appreciated.


RESPIRATORY: No accessory muscle use. Clear to auscultation. Breath sounds 

equal bilaterally. 


GASTROINTESTINAL: Abdomen soft, non-tender, nondistended. Hepatic and splenic 

margins not palpable. 


MUSCULOSKELETAL: No obvious deformities. No clubbing.  No cyanosis.  No edema. 


NEUROLOGICAL: Awake but nonverbal. No obvious cranial nerve deficits.  Motor 

grossly within normal limits.  Patient is noncommunicative


Laboratory





Laboratory Tests








Test


  18


00:51 18


00:57


 


White Blood Count 34.7  


 


Red Blood Count 2.44  


 


Hemoglobin 6.0  


 


Hematocrit 19.2  


 


Mean Corpuscular Volume 78.6  


 


Mean Corpuscular Hemoglobin 24.8  


 


Mean Corpuscular Hemoglobin


Concent 31.5 


  


 


 


Red Cell Distribution Width 19.6  


 


Platelet Count 393  


 


Mean Platelet Volume 7.5  


 


Neutrophils (%) (Auto) 88.6  


 


Lymphocytes (%) (Auto) 5.7  


 


Monocytes (%) (Auto) 5.5  


 


Eosinophils (%) (Auto) 0.1  


 


Basophils (%) (Auto) 0.1  


 


Neutrophils # (Auto) 30.7  


 


Lymphocytes # (Auto) 2.0  


 


Monocytes # (Auto) 1.9  


 


Eosinophils # (Auto) 0.0  


 


Basophils # (Auto) 0.1  


 


CBC Comment AUTO DIFF  


 


Differential Total Cells


Counted 100 


  


 


 


Neutrophils % (Manual) 72  


 


Band Neutrophils % 16  


 


Lymphocytes % 5  


 


Monocytes % 7  


 


Neutrophils # (Manual) 30.5  


 


Differential Comment


  FINAL DIFF


MANUAL 


 


 


Toxic Granulation 1+  


 


Platelet Estimate NORMAL  


 


Platelet Morphology Comment NORMAL  


 


Urine Color YELLOW  


 


Urine Turbidity CLOUDY  


 


Urine pH 8.0  


 


Urine Specific Gravity 1.012  


 


Urine Protein 100  


 


Urine Glucose (UA) NEG  


 


Urine Ketones NEG  


 


Urine Occult Blood TRACE  


 


Urine Nitrite NEG  


 


Urine Bilirubin NEG  


 


Urine Urobilinogen LESS THAN 2.0  


 


Urine Leukocyte Esterase LARGE  


 


Urine RBC 10  


 


Urine WBC 96  


 


Urine WBC Clumps FEW  


 


Urine Squamous Epithelial


Cells 13 


  


 


 


Urine Amorphous Sediment RARE  


 


Urine Bacteria MANY  


 


Urine Hyaline Casts 25  


 


Urine Granular Casts 7  


 


Urine Mucus FEW  


 


Microscopic Urinalysis Comment


  CATH-CULTURE


IND 


 


 


Blood Urea Nitrogen 61  


 


Creatinine 1.95  


 


Random Glucose 113  


 


Total Protein 5.8  


 


Albumin 1.6  


 


Calcium Level 8.1  


 


Alkaline Phosphatase 98  


 


Aspartate Amino Transf


(AST/SGOT) 13 


  


 


 


Alanine Aminotransferase


(ALT/SGPT) 12 


  


 


 


Total Bilirubin 0.3  


 


Sodium Level 151  


 


Potassium Level 4.1  


 


Chloride Level 121  


 


Carbon Dioxide Level 21.2  


 


Anion Gap 9  


 


Estimat Glomerular Filtration


Rate 30 


  


 


 


Lactic Acid Level  1.6 














 Date/Time


Source Procedure


Growth Status


 


 


 18 00:51


Blood Peripheral Aerobic Blood Culture


Pending Received


 


 18 00:51


Blood Peripheral Anaerobic Blood Culture


Pending Received


 


 18 00:51


Urine Catheterized Urine Urine Culture


Pending Worksheet








Result Diagram:  


18





Imaging





Last 24 hours Impressions








Head CT 18 Signed





Impressions: 





 Service Date/Time:  2018 01:17 - CONCLUSION:  No 





 significant change has occurred.       Elie Francois MD 


 


Chest X-Ray 18 Signed





Impressions: 





 Service Date/Time:  2018 01:00 - CONCLUSION: No acute 





 disease.       Elie Francois MD 


 


Abdomen/Pelvis CT 18 Signed





Impressions: 





 Service Date/Time:  2018 01:19 - CONCLUSION:  Stable 





 staghorn calculus on the right and nonobstructing left renal calculi as well 

as 





 bilateral renal masses. Calcified uterine fibroids. Diverticulosis without 





 diverticulitis. There is some air in the endometrial cavity which may be 

related 





 to recent instrumentation.     Elie Francois MD 











Septic Shock Reassessment


Septic shock perfusion:  reassessment completed





Caprini VTE Risk Assessment


Caprini VTE Risk Assessment:  Mod/High Risk (score >= 2)


Caprini Risk Assessment Model











 Point Value = 1          Point Value = 2  Point Value = 3  Point Value = 5


 


Age 41-60


Minor surgery


BMI > 25 kg/m2


Swollen legs


Varicose veins


Pregnancy or postpartum


History of unexplained or recurrent


   spontaneous 


Oral contraceptives or hormone


   replacement


Sepsis (< 1 month)


Serious lung disease, including


   pneumonia (< 1 month)


Abnormal pulmonary function


Acute myocardial infarction


Congestive heart failure (< 1 month)


History of inflammatory bowel disease


Medical patient at bed rest Age 61-74


Arthroscopic surgery


Major open surgery (> 45 min)


Laparoscopic surgery (> 45 min)


Malignancy


Confined to bed (> 72 hours)


Immobilizing plaster cast


Central venous access Age >= 75


History of VTE


Family history of VTE


Factor V Leiden


Prothrombin [id number]


Lupus anticoagulant


Anticardiolipin antibodies


Elevated serum homocysteine


Heparin-induced thrombocytopenia


Other congenital or acquired


   thrombophilia Stroke (< 1 month)


Elective arthroplasty


Hip, pelvis, or leg fracture


Acute spinal cord injury (< 1 month)








Prophylaxis Regimen











   Total Risk


Factor Score Risk Level Prophylaxis Regimen


 


0-1      Low Early ambulation


 


2 Moderate Order ONE of the following:


*Sequential Compression Device (SCD)


*Heparin 5000 units SQ BID


 


3-4 Higher Order ONE of the following medications:


*Heparin 5000 units SQ TID


*Enoxaparin/Lovenox 40 mg SQ daily (WT < 150 kg, CrCl > 30 mL/min)


*Enoxaparin/Lovenox 30 mg SQ daily (WT < 150 kg, CrCl > 10-29 mL/min)


*Enoxaparin/Lovenox 30 mg SQ BID (WT < 150 kg, CrCl > 30 mL/min)


AND/OR


*Sequential Compression Device (SCD)


 


5 or more Highest Order ONE of the following medications:


*Heparin 5000 units SQ TID (Preferred with Epidurals)


*Enoxaparin/Lovenox 40 mg SQ daily (WT < 150 kg, CrCl > 30 mL/min)


*Enoxaparin/Lovenox 30 mg SQ daily (WT < 150 kg, CrCl > 10-29 mL/min)


*Enoxaparin/Lovenox 30 mg SQ BID (WT < 150 kg, CrCl > 30 mL/min)


AND


*Sequential Compression Device (SCD)











Assessment and Plan


Assessment and Plan


Severe sepsis


- Likely urosepsis


- Broad-spectrum antibiotics


- Pancultures


- De-escalate per sensitivity


- Infectious disease consultation





Altered mental status


- Due to above


- Neuro checks per unit protocol


- CT head negative





Acute kidney injury


- Dehydration


- Aggressive IV fluids resuscitation


- Strict I's and O's


- Monitor creatinine levels


- Electrolytes protocol replacement





Severe anemia


- Chronic disorder


- Underlying malignancy


- No obvious signs of bleeding


- Transfuse to keep hemoglobin close to 8





Hypertension


- Hold antihypertensive meds due to borderline blood pressure


- Resume when indicated





DVT GI prophylaxis


- Teds SCDs


- No pharmacological DVT prophylaxis due to severe anemia requiring blood 

products


- Pepcid





Critical Care:


The total critical care time was 35 minutes. Time to perform other separately 

billable procedures was not included in the critical care time.











Tanner Shelby MD 2018 04:38

## 2018-02-12 NOTE — EKG
Date Performed: 02/12/2018       Time Performed: 00:19:49

 

PTAGE:      72 years

 

EKG:      SINUS TACHYCARDIA NONSPECIFIC T-WAVE ABNORMALITY ABNORMAL RHYTHM ECG Since the prior tracin
g, there has been no significant change 

 

 PREVIOUS TRACING            : 01/19/2018 03.59

 

DOCTOR:   Jono Centeno  Interpretating Date/Time  02/12/2018 11:03:48

## 2018-02-13 VITALS
OXYGEN SATURATION: 99 % | RESPIRATION RATE: 18 BRPM | DIASTOLIC BLOOD PRESSURE: 76 MMHG | TEMPERATURE: 97.9 F | HEART RATE: 102 BPM | SYSTOLIC BLOOD PRESSURE: 108 MMHG

## 2018-02-13 VITALS
HEART RATE: 84 BPM | DIASTOLIC BLOOD PRESSURE: 62 MMHG | SYSTOLIC BLOOD PRESSURE: 120 MMHG | TEMPERATURE: 98 F | RESPIRATION RATE: 20 BRPM | OXYGEN SATURATION: 98 %

## 2018-02-13 VITALS
OXYGEN SATURATION: 100 % | HEART RATE: 104 BPM | TEMPERATURE: 98.4 F | DIASTOLIC BLOOD PRESSURE: 74 MMHG | RESPIRATION RATE: 22 BRPM | SYSTOLIC BLOOD PRESSURE: 132 MMHG

## 2018-02-13 VITALS
RESPIRATION RATE: 15 BRPM | SYSTOLIC BLOOD PRESSURE: 101 MMHG | OXYGEN SATURATION: 99 % | HEART RATE: 84 BPM | DIASTOLIC BLOOD PRESSURE: 62 MMHG | TEMPERATURE: 98.1 F

## 2018-02-13 VITALS — HEART RATE: 76 BPM

## 2018-02-13 VITALS — HEART RATE: 84 BPM

## 2018-02-13 VITALS
RESPIRATION RATE: 18 BRPM | OXYGEN SATURATION: 97 % | DIASTOLIC BLOOD PRESSURE: 58 MMHG | TEMPERATURE: 98.2 F | SYSTOLIC BLOOD PRESSURE: 93 MMHG | HEART RATE: 80 BPM

## 2018-02-13 VITALS — HEART RATE: 82 BPM

## 2018-02-13 VITALS
DIASTOLIC BLOOD PRESSURE: 73 MMHG | SYSTOLIC BLOOD PRESSURE: 131 MMHG | RESPIRATION RATE: 20 BRPM | HEART RATE: 95 BPM | OXYGEN SATURATION: 98 % | TEMPERATURE: 97.9 F

## 2018-02-13 VITALS — OXYGEN SATURATION: 100 %

## 2018-02-13 LAB
ALBUMIN SERPL-MCNC: 2.1 GM/DL (ref 3.4–5)
ALP SERPL-CCNC: 127 U/L (ref 45–117)
ALT SERPL-CCNC: 14 U/L (ref 10–53)
AST SERPL-CCNC: 10 U/L (ref 15–37)
BASOPHILS # BLD AUTO: 0.1 TH/MM3 (ref 0–0.2)
BASOPHILS NFR BLD: 0.3 % (ref 0–2)
BILIRUB SERPL-MCNC: 0.8 MG/DL (ref 0.2–1)
BUN SERPL-MCNC: 26 MG/DL (ref 7–18)
CALCIUM SERPL-MCNC: 11.1 MG/DL (ref 8.5–10.1)
CHLORIDE SERPL-SCNC: 124 MEQ/L (ref 98–107)
CREAT SERPL-MCNC: 1.19 MG/DL (ref 0.5–1)
EOSINOPHIL # BLD: 0.4 TH/MM3 (ref 0–0.4)
EOSINOPHIL NFR BLD: 2.1 % (ref 0–4)
ERYTHROCYTE [DISTWIDTH] IN BLOOD BY AUTOMATED COUNT: 19.5 % (ref 11.6–17.2)
GFR SERPLBLD BASED ON 1.73 SQ M-ARVRAT: 54 ML/MIN (ref 89–?)
GLUCOSE SERPL-MCNC: 143 MG/DL (ref 74–106)
HCO3 BLD-SCNC: 24.8 MEQ/L (ref 21–32)
HCT VFR BLD CALC: 32.4 % (ref 35–46)
HGB BLD-MCNC: 10.6 GM/DL (ref 11.6–15.3)
INR PPP: 1.2 RATIO
LYMPHOCYTES # BLD AUTO: 1.5 TH/MM3 (ref 1–4.8)
LYMPHOCYTES NFR BLD AUTO: 7 % (ref 9–44)
MAGNESIUM SERPL-MCNC: 2.2 MG/DL (ref 1.5–2.5)
MCH RBC QN AUTO: 27.3 PG (ref 27–34)
MCHC RBC AUTO-ENTMCNC: 32.7 % (ref 32–36)
MCV RBC AUTO: 83.6 FL (ref 80–100)
MONOCYTE #: 1 TH/MM3 (ref 0–0.9)
MONOCYTES NFR BLD: 4.9 % (ref 0–8)
NEUTROPHILS # BLD AUTO: 18.1 TH/MM3 (ref 1.8–7.7)
NEUTROPHILS NFR BLD AUTO: 85.7 % (ref 16–70)
PHOSPHATE SERPL-MCNC: 2 MG/DL (ref 2.5–4.9)
PLATELET # BLD: 258 TH/MM3 (ref 150–450)
PMV BLD AUTO: 6.9 FL (ref 7–11)
PROT SERPL-MCNC: 7.4 GM/DL (ref 6.4–8.2)
PROTHROMBIN TIME: 11.8 SEC (ref 9.8–11.6)
RANDOM VANCOMYCIN: 9.4 COMMENT
RBC # BLD AUTO: 3.87 MIL/MM3 (ref 4–5.3)
SODIUM SERPL-SCNC: 154 MEQ/L (ref 136–145)
WBC # BLD AUTO: 21.1 TH/MM3 (ref 4–11)

## 2018-02-13 RX ADMIN — THIAMINE HYDROCHLORIDE SCH MLS/HR: 100 INJECTION, SOLUTION INTRAMUSCULAR; INTRAVENOUS at 01:20

## 2018-02-13 RX ADMIN — MORPHINE SULFATE PRN MG: 2 INJECTION, SOLUTION INTRAMUSCULAR; INTRAVENOUS at 10:19

## 2018-02-13 RX ADMIN — TAZOBACTAM SODIUM AND PIPERACILLIN SODIUM SCH MLS/HR: 250; 2 INJECTION, SOLUTION INTRAVENOUS at 16:00

## 2018-02-13 RX ADMIN — Medication SCH ML: at 09:00

## 2018-02-13 RX ADMIN — STANDARDIZED SENNA CONCENTRATE AND DOCUSATE SODIUM SCH TAB: 8.6; 5 TABLET, FILM COATED ORAL at 21:11

## 2018-02-13 RX ADMIN — CHLORHEXIDINE GLUCONATE SCH PACK: 500 CLOTH TOPICAL at 20:56

## 2018-02-13 RX ADMIN — Medication SCH ML: at 21:11

## 2018-02-13 RX ADMIN — HUMAN INSULIN SCH: 100 INJECTION, SOLUTION SUBCUTANEOUS at 17:00

## 2018-02-13 RX ADMIN — THIAMINE HYDROCHLORIDE SCH MLS/HR: 100 INJECTION, SOLUTION INTRAMUSCULAR; INTRAVENOUS at 09:50

## 2018-02-13 RX ADMIN — TAZOBACTAM SODIUM AND PIPERACILLIN SODIUM SCH MLS/HR: 250; 2 INJECTION, SOLUTION INTRAVENOUS at 21:11

## 2018-02-13 RX ADMIN — SODIUM CHLORIDE SCH MLS/HR: 900 INJECTION INTRAVENOUS at 13:00

## 2018-02-13 RX ADMIN — PANTOPRAZOLE SODIUM SCH MG: 40 INJECTION, POWDER, FOR SOLUTION INTRAVENOUS at 09:00

## 2018-02-13 RX ADMIN — MORPHINE SULFATE PRN MG: 2 INJECTION, SOLUTION INTRAMUSCULAR; INTRAVENOUS at 01:32

## 2018-02-13 RX ADMIN — HUMAN INSULIN SCH: 100 INJECTION, SOLUTION SUBCUTANEOUS at 12:00

## 2018-02-13 RX ADMIN — SODIUM CHLORIDE SCH MLS/HR: 234 INJECTION INTRAMUSCULAR; INTRAVENOUS; SUBCUTANEOUS at 14:26

## 2018-02-13 RX ADMIN — HEPARIN SODIUM SCH UNITS: 10000 INJECTION, SOLUTION INTRAVENOUS; SUBCUTANEOUS at 21:11

## 2018-02-13 RX ADMIN — ATORVASTATIN CALCIUM SCH MG: 20 TABLET, FILM COATED ORAL at 21:10

## 2018-02-13 RX ADMIN — TAZOBACTAM SODIUM AND PIPERACILLIN SODIUM SCH MLS/HR: 250; 2 INJECTION, SOLUTION INTRAVENOUS at 03:24

## 2018-02-13 RX ADMIN — STANDARDIZED SENNA CONCENTRATE AND DOCUSATE SODIUM SCH TAB: 8.6; 5 TABLET, FILM COATED ORAL at 09:00

## 2018-02-13 RX ADMIN — HUMAN INSULIN SCH: 100 INJECTION, SOLUTION SUBCUTANEOUS at 21:11

## 2018-02-13 RX ADMIN — TAZOBACTAM SODIUM AND PIPERACILLIN SODIUM SCH MLS/HR: 250; 2 INJECTION, SOLUTION INTRAVENOUS at 09:27

## 2018-02-13 NOTE — HHI.PR
Subjective


Patient symptoms today


Pt seen and examined. More awake and alert today. S/P right PCNT with 

nephroureteral stent.





Objective


Vital Signs





Vital Signs








  Date Time  Temp Pulse Resp B/P (MAP) Pulse Ox O2 Delivery O2 Flow Rate FiO2


 


2/13/18 14:00  76      


 


2/13/18 12:00 97.9 102 18 108/76 (87) 99   


 


2/13/18 12:00  84      


 


2/13/18 10:22   20     


 


2/13/18 10:03  76      


 


2/13/18 08:00 98.0 81 20 120/62 (81) 98   


 


2/13/18 08:00  84      


 


2/13/18 07:55     100   21


 


2/13/18 07:00     99 Room Air  


 


2/13/18 06:00  76      


 


2/13/18 04:00  84      


 


2/13/18 04:00 98.1 84 15 101/62 (75) 99   


 


2/13/18 02:00  82      


 


2/13/18 00:00  80      


 


2/13/18 00:00 98.2 80 18 93/58 (70) 97   


 


2/12/18 22:00  88      


 


2/12/18 20:00 97.6 94 20 107/65 (79) 98   


 


2/12/18 20:00  94      


 


2/12/18 19:49   18     


 


2/12/18 19:00     100 Room Air  


 


2/12/18 18:00  92      


 


2/12/18 17:00 98.4 96 20 126/75 (92) 100   














Intake & Output  


 


 2/13/18 2/13/18





 07:00 19:00


 


Intake Total 1559 ml 


 


Output Total 1750 ml 


 


Balance -191 ml 


 


  


 


Intake Oral 240 ml 


 


IV Total 1319 ml 


 


Output Urine Total 1150 ml 


 


Drainage Total 600 ml 


 


# Bowel Movements 0 








Result Diagram:  


2/13/18 0945                                                                   

             2/13/18 0945





Objective Remarks


Abd:soft,nt,nd


Urine: clearing


Right PCNT: derek urine


Medications and IVs





Current Medications








 Medications


  (Trade)  Dose


 Ordered  Sig/[place]


 Route  Start Time


 Stop Time Status Last Admin


 


  (Lipitor)  20 mg  HS


 PO  2/12/18 21:00


    2/12/18 20:59


 


 


  (Tylenol)  650 mg  Q6H  PRN


 PO  2/12/18 02:45


    2/12/18 18:49


 


 


  (Morphine Inj)  2 mg  Q2H  PRN


 IV PUSH  2/12/18 02:45


    2/13/18 10:19


 


 


  (Zofran Inj)  4 mg  Q6H  PRN


 IV PUSH  2/12/18 02:45


     


 


 


 Miscellaneous


 Information  1  Q361D


 XX  2/12/18 02:45


     


 


 


  (Chlorhexidine


 2% Cloth)  3 pack


 Taper  DAILY@04


 TOP  2/12/18 04:00


 2/8/19 03:59   


 


 


  (Chlorhexidine


 2% Cloth)  3 pack  UNSCH  PRN


 TOP  2/12/18 02:45


     


 


 


  (Kaylee-Colace)  1 tab  BID


 PO  2/12/18 09:00


    2/13/18 09:00


 


 


  (Milk Of


 Magnesia Liq)  30 ml  Q12H  PRN


 PO  2/12/18 02:45


     


 


 


  (Senokot)  17.2 mg  Q12H  PRN


 PO  2/12/18 02:45


     


 


 


  (Dulcolax Supp)  10 mg  DAILY  PRN


 RECTAL  2/12/18 02:45


     


 


 


  (Lactulose Liq)  30 ml  DAILY  PRN


 PO  2/12/18 02:45


     


 


 


  (NS Flush)  2 ml  UNSCH  PRN


 IV FLUSH  2/12/18 02:45


     


 


 


  (NS Flush)  2 ml  BID


 IV FLUSH  2/12/18 09:00


    2/13/18 09:00


 


 


 Pharmacy Profile


 Note  0 ml @ 0


 mls/hr  UNSCH


 OTHER  2/12/18 02:45


     


 


 


 Piperacillin Sod/


 Tazobactam Sod  50 ml @ 


 100 mls/hr  Q6H


 IV  2/12/18 04:00


    2/13/18 09:27


 


 


  (NS Flush)    DAILY


 IV FLUSH  2/12/18 11:30


    2/13/18 09:00


 


 


  (NS Flush)    UNSCH  PRN


 IV FLUSH  2/12/18 11:30


     


 


 


  (Albuterol Neb)  2.5 mg  Q2HR NEB  PRN


 NEB  2/12/18 13:45


     


 


 


  (Cathflo


 Activase Inj)  2 mg  Q2H  PRN


 INTRACATH  2/13/18 12:00


     


 


 


  (Protonix)  40 mg  DAILY


 PO  2/14/18 09:00


     


 


 


  (Heparin Inj)  5,000 units  Q12HR


 SQ  2/13/18 21:00


     


 


 


  (Ferrous Sulfate)  325 mg  DAILY


 PO  2/14/18 09:00


     


 


 


  (Vitamin C)  500 mg  DAILY


 PO  2/14/18 09:00


     


 


 


  (D50w (Vial) Inj)  50 ml  UNSCH  PRN


 IV PUSH  2/13/18 10:00


     


 


 


  (Glucagon Inj)  1 mg  UNSCH  PRN


 OTHER  2/13/18 10:00


     


 


 


  (NovoLIN R


 SUPPLEMENTAL


 SCALE)  1  ACHS SLIDING  SCALE


 SQ  2/13/18 12:00


     


 


 


 Vancomycin HCl


 1250 mg/Sodium


 Chloride  262.5 ml @ 


 250 mls/hr  Q24H


 IV  2/13/18 13:00


    2/13/18 13:00


 


 


 Miscellaneous


 Information  SPECIFIC LAB TO


 BE DRAWN:VANCO


 TROUGH DATE TO...  ONCE  ONCE


 .XX  2/16/18 12:45


 2/16/18 12:46   


 


 


 Dextrose  1,000 ml @ 


 100 mls/hr  Q10H


 IV  2/13/18 13:30


 2/14/18 09:29  2/13/18 14:26


 











Assessment and Plan


Assessment and Plan


72 y.o female with sepsis due to large right staghorn calculus s/p right PCNT 

with nephroureteral stent by IR yesterday


Continue page and Right PCNT drainage


Continue IV abx


Will need right PCNL as outpt.











Rob Mazariegos DO Feb 13, 2018 14:35

## 2018-02-13 NOTE — HHI.IDPN
Subjective


Subjective


Remarks


Ms. Park is a 72-year-old female with past medical history significant for 

hypertension, dyslipidemia, who is a nursing home resident with unknown 

baseline mentation.  Patient was transferred from the nursing home for 

worsening mental status.  Reportedly there is a temperature 101.5 at the 

nursing home and due to persistent fever and hypotension patient was 

transferred to the hospital.  On arrival her blood pressure was 90/50 she has 

received approximately 4 L of IV fluid boluses so far.  Overnight she did not 

need any pressors and has remained on room air.





Urology has seen the patient and has recommended interventional radiology 

guided percutaneous nephrostomy tube placement in view of large staghorn 

calculus.  Infectious disease is consulted for evaluation and management of 

sepsis and pyelonephritis in a patient with Staghorn Calculus.





Overnight events reviewed


No fevers


No rash


No diarrhea


Antibiotics


Zosyn IV


Lines


Line sites with no e.o infection


Past Medical History


Past Medical History


Coronary artery disease


Hypercholesterolemia


History of recurrent UTIs


Pyelonephritis


Hypertension


Past Surgical History


Tubal ligation


Allergies:  


Coded Allergies:  


     No Known Allergies (Unverified  Allergy, Unknown, 1/19/18)





Objective


.





Vital Signs








  Date Time  Temp Pulse Resp B/P (MAP) Pulse Ox O2 Delivery O2 Flow Rate FiO2


 


2/13/18 10:22   20     


 


2/13/18 10:03  76      


 


2/13/18 08:00 98.0 81 20 120/62 (81) 98   


 


2/13/18 08:00  84      


 


2/13/18 07:55     100   21


 


2/13/18 07:00     99 Room Air  


 


2/13/18 06:00  76      


 


2/13/18 04:00  84      


 


2/13/18 04:00 98.1 84 15 101/62 (75) 99   


 


2/13/18 02:00  82      


 


2/13/18 00:00  80      


 


2/13/18 00:00 98.2 80 18 93/58 (70) 97   


 


2/12/18 22:00  88      


 


2/12/18 20:00 97.6 94 20 107/65 (79) 98   


 


2/12/18 20:00  94      


 


2/12/18 19:49   18     


 


2/12/18 19:00     100 Room Air  


 


2/12/18 18:00  92      


 


2/12/18 17:00 98.4 96 20 126/75 (92) 100   


 


2/12/18 14:00  92      








.





Laboratory Tests








Test


  2/12/18


00:51 2/12/18


10:24 2/12/18


12:36 2/13/18


09:45


 


White Blood Count 34.7 TH/MM3    21.1 TH/MM3 


 


Red Blood Count 2.44 MIL/MM3    3.87 MIL/MM3 


 


Hemoglobin 6.0 GM/DL  10.0 GM/DL  10.4 GM/DL  10.6 GM/DL 


 


Hematocrit 19.2 %  31.1 %  31.2 %  32.4 % 


 


Mean Corpuscular Volume 78.6 FL    83.6 FL 


 


Mean Corpuscular Hemoglobin 24.8 PG    27.3 PG 


 


Mean Corpuscular Hemoglobin


Concent 31.5 % 


  


  


  32.7 % 


 


 


Red Cell Distribution Width 19.6 %    19.5 % 


 


Platelet Count 393 TH/MM3    258 TH/MM3 


 


Mean Platelet Volume 7.5 FL    6.9 FL 


 


Neutrophils (%) (Auto) 88.6 %    85.7 % 


 


Lymphocytes (%) (Auto) 5.7 %    7.0 % 


 


Monocytes (%) (Auto) 5.5 %    4.9 % 


 


Eosinophils (%) (Auto) 0.1 %    2.1 % 


 


Basophils (%) (Auto) 0.1 %    0.3 % 


 


Neutrophils # (Auto) 30.7 TH/MM3    18.1 TH/MM3 


 


Lymphocytes # (Auto) 2.0 TH/MM3    1.5 TH/MM3 


 


Monocytes # (Auto) 1.9 TH/MM3    1.0 TH/MM3 


 


Eosinophils # (Auto) 0.0 TH/MM3    0.4 TH/MM3 


 


Basophils # (Auto) 0.1 TH/MM3    0.1 TH/MM3 


 


CBC Comment AUTO DIFF    AUTO DIFF 


 


Differential Total Cells


Counted 100 


  


  


  


 


 


Neutrophils % (Manual) 72 %    


 


Band Neutrophils % 16 %    


 


Lymphocytes % 5 %    


 


Monocytes % 7 %    


 


Neutrophils # (Manual) 30.5 TH/MM3    


 


Differential Comment


  FINAL DIFF


MANUAL 


  


  AUTO DIFF


CONFIRMED


 


Toxic Granulation 1+    


 


Platelet Estimate NORMAL    


 


Platelet Morphology Comment NORMAL    


 


Blood Smear Pathologist Review     


 


Reticulocyte Count 5.3 %    


 


Absolute Reticulocyte Count 133.1 MIL/L    








Laboratory Tests








Test


  2/12/18


00:51 2/12/18


00:57 2/12/18


10:02 2/12/18


12:36


 


Blood Urea Nitrogen 61 MG/DL    


 


Creatinine 1.95 MG/DL    


 


Random Glucose 113 MG/DL    


 


Total Protein 5.8 GM/DL    


 


Albumin 1.6 GM/DL    


 


Calcium Level 8.1 MG/DL    


 


Alkaline Phosphatase 98 U/L    


 


Aspartate Amino Transf


(AST/SGOT) 13 U/L 


  


  


  


 


 


Alanine Aminotransferase


(ALT/SGPT) 12 U/L 


  


  


  


 


 


Total Bilirubin 0.3 MG/DL    


 


Sodium Level 151 MEQ/L    


 


Potassium Level 4.1 MEQ/L    


 


Chloride Level 121 MEQ/L    


 


Carbon Dioxide Level 21.2 MEQ/L    


 


Anion Gap 9 MEQ/L    


 


Estimat Glomerular Filtration


Rate 30 ML/MIN 


  


  


  


 


 


Lactic Acid Level  1.6 mmol/L  1.2 mmol/L  


 


Ammonia   24 MCMOL/L  


 


Iron Level    58 MCG/DL 


 


Total Iron Binding Capacity    168 MCG/DL 


 


Percent Iron Saturation    34.5 % 


 


Ferritin    2835 NG/ML 


 


Test


  2/13/18


09:45 


  


  


 


 


Blood Urea Nitrogen 26 MG/DL    


 


Creatinine 1.19 MG/DL    


 


Random Glucose 143 MG/DL    


 


Total Protein 7.4 GM/DL    


 


Albumin 2.1 GM/DL    


 


Calcium Level 11.1 MG/DL    


 


Phosphorus Level 2.0 MG/DL    


 


Magnesium Level 2.2 MG/DL    


 


Alkaline Phosphatase 127 U/L    


 


Aspartate Amino Transf


(AST/SGOT) 10 U/L 


  


  


  


 


 


Alanine Aminotransferase


(ALT/SGPT) 14 U/L 


  


  


  


 


 


Total Bilirubin 0.8 MG/DL    


 


Sodium Level 154 MEQ/L    


 


Potassium Level 3.7 MEQ/L    


 


Chloride Level 124 MEQ/L    


 


Carbon Dioxide Level 24.8 MEQ/L    


 


Anion Gap 5 MEQ/L    


 


Estimat Glomerular Filtration


Rate 54 ML/MIN 


  


  


  


 


 


Lactic Acid Level 1.0 mmol/L    








Microbiology








 Date/Time


Source Procedure


Growth Status


 


 


 2/12/18 10:24


Blood Peripheral Aerobic Blood Culture - Preliminary


NO GROWTH IN 1 DAY Resulted


 


 2/12/18 10:24


Blood Peripheral Anaerobic Blood Culture - Preliminary


NO GROWTH IN 1 DAY Resulted





 2/12/18 10:02


Blood Peripheral Aerobic Blood Culture - Preliminary


NO GROWTH IN 1 DAY Resulted


 


 2/12/18 10:02


Blood Peripheral Anaerobic Blood Culture - Preliminary


NO GROWTH IN 1 DAY Resulted





 2/12/18 00:51


Blood Peripheral Aerobic Blood Culture - Preliminary


NO GROWTH IN 1 DAY Resulted


 


 2/12/18 00:51


Blood Peripheral Anaerobic Blood Culture - Preliminary


NO GROWTH IN 1 DAY Resulted





 2/12/18 00:45


Blood Peripheral Aerobic Blood Culture - Preliminary


NO GROWTH IN 1 DAY Resulted


 


 2/12/18 00:45


Blood Peripheral Anaerobic Blood Culture - Preliminary


NO GROWTH IN 1 DAY Resulted


 


 2/12/18 00:51


Urine Catheterized Urine Urine Culture


Pending Worksheet








Imaging





Last Impressions








Chest X-Ray 2/12/18 1124 Signed





Impressions: 





 Service Date/Time:  Monday, February 12, 2018 11:39 - CONCLUSION:  1. Left IJ 





 central line tip in the very proximal SVC. No pneumothorax. 2. Minimal 

bibasilar 





 airspace disease, likely atelectasis.     Mario Del Rosario MD 


 


Head CT 2/12/18 0046 Signed





Impressions: 





 Service Date/Time:  Monday, February 12, 2018 01:17 - CONCLUSION:  No 





 significant change has occurred.       Elie Francois MD 


 


Abdomen/Pelvis CT 2/12/18 0046 Signed





Impressions: 





 Service Date/Time:  Monday, February 12, 2018 01:19 - CONCLUSION:  Stable 





 staghorn calculus on the right and nonobstructing left renal calculi as well 

as 





 bilateral renal masses. Calcified uterine fibroids. Diverticulosis without 





 diverticulitis. There is some air in the endometrial cavity which may be 

related 





 to recent instrumentation.     Elie Francois MD 








Physical Exam


GENERAL: This is a well-nourished, well-developed patient, in no apparent 

distress.


SKIN: No rashes, ecchymoses or lesions. Cool and dry.


HEAD: Atraumatic. Normocephalic. No temporal or scalp tenderness.


EYES: Pupils equal round and reactive. Extraocular motions intact. No scleral 

icterus. No injection or drainage. 


ENT: Nose without bleeding, purulent drainage or septal hematoma. Throat 

without erythema, tonsillar hypertrophy or exudate. Uvula midline. Airway 

patent.


NECK: Trachea midline. Supple, nontender, no meningeal signs.


CARDIOVASCULAR: Heart sounds audible.


RESPIRATORY: Clear to auscultation. Breath sounds equal bilaterally. No wheezes

, rales, or rhonchi.  


GASTROINTESTINAL: Abdomen soft, non-tender, nondistended. 


MUSCULOSKELETAL: Extremities without clubbing, cyanosis, or edema. 


NEUROLOGICAL: Awake and alert. ?  Orientation.  Moves all 4 extremities 

although weak. Sitting in a chair.


Psych cooperative


IV line sites with no evidence of infection.





Assessment & Plan


Remarks


Severe sepsis present on admission


History of Proteus UTI reportedly pan sensitive prior to admission


Complicated UTI: Staghorn calculus with pyelonephritis, obstructive uropathy s/

p nephrostomy tube


High grade leucocytosis.


Hypertension


Dyslipidemia


Acute metabolic encephalopathy: Infection, ?  Baseline unknown





Recommendations:


Continue Zosyn IV


Follow cultures


Follow clinically.


dw 











Nemani,Sandra MD Feb 13, 2018 12:41

## 2018-02-13 NOTE — HHI.CCPN
Subjective


Remarks/Hospital Course


72-year-old female presents from a nursing home for altered mental status.  The 

patient is nonverbal however his weight. As per the nursing home documentation 

patient had a temperature 101.5.  They had given her Tylenol 1 hour prior to 

coming to the emergency room.  Patient has a blood pressure of 90/50.  Given 

her nonverbal self difficult to get any history out of the patient.  She is 

accompanied by her daughters, who told me that she was just recently diagnosed 

with endometrial cancer from last Pap smear.





2/12: Transfuse 4 units PRBCs.  Currently hemodynamically stable.  Remains 

encephalopathic.  Currently afebrile.





Subjective





2/13:  Afebrile.  A.m. laboratories currently pending.  Status post 

percutaneous nephroureteral stent placed by IR 2/12.  More awake and 

interactive today.





Objective





Vital Signs








  Date Time  Temp Pulse Resp B/P (MAP) Pulse Ox O2 Delivery O2 Flow Rate FiO2


 


2/13/18 08:00  84      


 


2/13/18 07:55     100   21


 


2/13/18 07:00      Room Air  


 


2/13/18 04:00 98.1  15 101/62 (75)    














Intake and Output   


 


 2/13/18 2/13/18 2/14/18





 08:00 16:00 00:00


 


Intake Total 1509 ml  


 


Output Total 1750 ml  


 


Balance -241 ml  








Result Diagram:  


2/12/18 1236                                                                   

             2/12/18 0051





Other Results





Microbiology








 Date/Time


Source Procedure


Growth Status


 


 


 2/12/18 10:24


Blood Peripheral Aerobic Blood Culture


Pending Received


 


 2/12/18 10:24


Blood Peripheral Anaerobic Blood Culture


Pending Received


 


 2/12/18 00:51


Urine Catheterized Urine Urine Culture


Pending Worksheet








Imaging





Last Impressions








Chest X-Ray 2/12/18 1124 Signed





Impressions: 





 Service Date/Time:  Monday, February 12, 2018 11:39 - CONCLUSION:  1. Left IJ 





 central line tip in the very proximal SVC. No pneumothorax. 2. Minimal 

bibasilar 





 airspace disease, likely atelectasis.     Mario Del Rosario MD 


 


Head CT 2/12/18 0046 Signed





Impressions: 





 Service Date/Time:  Monday, February 12, 2018 01:17 - CONCLUSION:  No 





 significant change has occurred.       Elie Francois MD 


 


Abdomen/Pelvis CT 2/12/18 0046 Signed





Impressions: 





 Service Date/Time:  Monday, February 12, 2018 01:19 - CONCLUSION:  Stable 





 staghorn calculus on the right and nonobstructing left renal calculi as well 

as 





 bilateral renal masses. Calcified uterine fibroids. Diverticulosis without 





 diverticulitis. There is some air in the endometrial cavity which may be 

related 





 to recent instrumentation.     Elie Francois MD 








Objective Remarks


GENERAL: 72-year-old AA female currently resting in bed in no acute distress


SKIN: Focused skin assessment warm/dry.  No rash


HEAD: Atraumatic. Normocephalic. 


EYES: Pupils equal and round 3 mm bilaterally and reactive. No scleral icterus. 

No injection or drainage. 


ENT: No nasal bleeding or discharge.  Mucous membranes pink and moist.


NECK: Trachea midline. No JVD. 


CARDIOVASCULAR: Regular rate and rhythm S1, S2.  No S4.  No murmur. 


RESPIRATORY: No accessory muscle use. Clear to auscultation. Breath sounds 

equal bilaterally. 


GASTROINTESTINAL: Abdomen soft, non-tender, nondistended.  Hypoactive bowel 

sounds are appreciated


MUSCULOSKELETAL: No obvious deformities.  No significant peripheral edema. 


NEUROLOGICAL: Awake but nonverbal. No obvious cranial nerve deficits.  Motor 

grossly within normal limits.  Patient is noncommunicative


Urinary Catheter:  Yes


Assessment to:  Continue


Ortega insert reason:  Prolonged Immobilization


Vascular Central Line Catheter:  Yes


Assessment to:  Continue


Date of Insertion:  Feb 12, 2018


Line:  Central Venous Catheter


Side:  Left


Location:  Internal, Jugular





A/P


Assessment and Plan


Neuro/Psych:


 


Acute encephalopathy secondary to severe sepsis/urinary tract source


Chronic narcotic use





CT brain 2/12 revealed no acute intracranial findings


Acetaminophen 650 mg p.o. every 6 hours as needed fever/pain 1 through 5


Morphine sulfate 2 mg IV every 2 hours as needed pain 6-10


On hydrocodone/acetaminophen 5/325 1 tablet every 6 hours as needed pain 

chronically





CV: 





Severe sepsis


History of hypertension


Dyslipidemia





Holding amlodipine 5 mg daily lisinopril 20 daily light of hypertension/acute 

kidney injury.  Resume when clinically indicated


Currently on atorvastatin 20 mg p.o. daily for dyslipidemia.  Continue


Received 4 units PRBCs.


Lactate 1.2


See infectious disease for antibiotic coverage





Resp: 





Nasal cannula to maintain saturations greater than or equal to 92%


Incentive spirometry  while awake


As needed albuterol aerosols every 2 hours.  Dyspnea





GI: 





Hypoalbuminemia


Diverticulosis





Advance diet as tolerated


Pantoprazole 40 mg by mouth daily for GI prophylaxis


Docusate sodium/senna 1 tablet twice daily for bowel regimen





:  





Right staghorn calculus


Nonobstructing left nephrolithiasis





Status post percutaneous nephroureteral stent right-sided for right staghorn 

calculus/likely source of sepsis


Will need lithotripsy at some point in the future


Maintain Ortega catheter


Urology evaluation with Dr. Mazariegos appreciated





Endo:  





Hyperglycemia of critical illness





Sliding scale insulin Novulog with Accu-Cheks every before meals/at bedtime to 

maintain euglycemia/low regimen





Renal: 





Acute kidney injury


Bilateral renal masses





Avoid nephrotoxic drugs


Aggressive crystalloid hydration switched from normal saline at 124 cc an hour 

to half normal saline 84 cc an hour with elevated sodium


Monitor BMP daily.  Follow trends


Pending urine eosinophils.  Noted sodium and creatinine





GYN:





Bilateral uterine fibroids


Endometrial cancer





Outpatient follow-up





Heme:





Microcytic anemia


Leukocytosis





Status post 4 units PRBCs


Monitor CBC daily.  Follow trends


Check coags


Normal FE.  Low TIBC.  Elevated ferritin.  Pending reticulocyte count and 

peripheral smear





ID:





History of pansensitive Proteus mirabilis urinary tract infection





Currently piperacillin/tazobactam 2.25 g IV every 6 hours.  Received 1 dose of 

ceftriaxone in ED and 1 dose of levofloxacin





Pertinent cultures





2/12 -blood cultures 2 -pending


2/12 -UA -pending





Infectious disease consult appreciated





MSK:





PT evaluate and treat





FEN:





Hypernatremia





Switch IV fluids to half-normal saline at 84 cc an hour.  Recheck in a.m.





Access


-Peripheral IV.  Central if indicated





Prophylaxis


-GI -pantoprazole


-DVT -SCD/okay to resume heparin subcutaneous for DVT prophylaxis





Level 2 follow-up





Stable from critical care medicine standpoint.  Assign care to hospitalist in 

a.m. 2/14.  Okay to transfer from ICU.











Haja Cox MD Feb 13, 2018 09:46

## 2018-02-14 VITALS
DIASTOLIC BLOOD PRESSURE: 73 MMHG | HEART RATE: 73 BPM | RESPIRATION RATE: 18 BRPM | OXYGEN SATURATION: 98 % | SYSTOLIC BLOOD PRESSURE: 130 MMHG | TEMPERATURE: 97.8 F

## 2018-02-14 VITALS
DIASTOLIC BLOOD PRESSURE: 58 MMHG | SYSTOLIC BLOOD PRESSURE: 117 MMHG | RESPIRATION RATE: 17 BRPM | HEART RATE: 80 BPM | TEMPERATURE: 98.5 F | OXYGEN SATURATION: 97 %

## 2018-02-14 VITALS
RESPIRATION RATE: 17 BRPM | DIASTOLIC BLOOD PRESSURE: 69 MMHG | SYSTOLIC BLOOD PRESSURE: 139 MMHG | HEART RATE: 87 BPM | TEMPERATURE: 98.2 F | OXYGEN SATURATION: 98 %

## 2018-02-14 VITALS
RESPIRATION RATE: 18 BRPM | DIASTOLIC BLOOD PRESSURE: 74 MMHG | HEART RATE: 87 BPM | TEMPERATURE: 98.7 F | SYSTOLIC BLOOD PRESSURE: 117 MMHG | OXYGEN SATURATION: 92 %

## 2018-02-14 VITALS
HEART RATE: 82 BPM | DIASTOLIC BLOOD PRESSURE: 78 MMHG | OXYGEN SATURATION: 95 % | TEMPERATURE: 98.6 F | SYSTOLIC BLOOD PRESSURE: 120 MMHG | RESPIRATION RATE: 18 BRPM

## 2018-02-14 VITALS
RESPIRATION RATE: 17 BRPM | DIASTOLIC BLOOD PRESSURE: 73 MMHG | TEMPERATURE: 97.8 F | OXYGEN SATURATION: 98 % | SYSTOLIC BLOOD PRESSURE: 129 MMHG | HEART RATE: 86 BPM

## 2018-02-14 LAB
BASOPHILS # BLD AUTO: 0 TH/MM3 (ref 0–0.2)
BASOPHILS NFR BLD: 0.2 % (ref 0–2)
BUN SERPL-MCNC: 12 MG/DL (ref 7–18)
CALCIUM SERPL-MCNC: 8.8 MG/DL (ref 8.5–10.1)
CHLORIDE SERPL-SCNC: 124 MEQ/L (ref 98–107)
CREAT SERPL-MCNC: 0.76 MG/DL (ref 0.5–1)
EOSINOPHIL # BLD: 0.4 TH/MM3 (ref 0–0.4)
EOSINOPHIL NFR BLD: 2.4 % (ref 0–4)
ERYTHROCYTE [DISTWIDTH] IN BLOOD BY AUTOMATED COUNT: 20.3 % (ref 11.6–17.2)
GFR SERPLBLD BASED ON 1.73 SQ M-ARVRAT: 90 ML/MIN (ref 89–?)
GLUCOSE SERPL-MCNC: 169 MG/DL (ref 74–106)
HCO3 BLD-SCNC: 22.3 MEQ/L (ref 21–32)
HCT VFR BLD CALC: 30.8 % (ref 35–46)
HGB BLD-MCNC: 10 GM/DL (ref 11.6–15.3)
LYMPHOCYTES # BLD AUTO: 1.9 TH/MM3 (ref 1–4.8)
LYMPHOCYTES NFR BLD AUTO: 10.8 % (ref 9–44)
MAGNESIUM SERPL-MCNC: 1.5 MG/DL (ref 1.5–2.5)
MCH RBC QN AUTO: 27.6 PG (ref 27–34)
MCHC RBC AUTO-ENTMCNC: 32.6 % (ref 32–36)
MCV RBC AUTO: 84.7 FL (ref 80–100)
MONOCYTE #: 0.9 TH/MM3 (ref 0–0.9)
MONOCYTES NFR BLD: 5.2 % (ref 0–8)
NEUTROPHILS # BLD AUTO: 14 TH/MM3 (ref 1.8–7.7)
NEUTROPHILS NFR BLD AUTO: 81.4 % (ref 16–70)
PHOSPHATE SERPL-MCNC: 1.2 MG/DL (ref 2.5–4.9)
PLATELET # BLD: 212 TH/MM3 (ref 150–450)
PMV BLD AUTO: 7.5 FL (ref 7–11)
RBC # BLD AUTO: 3.63 MIL/MM3 (ref 4–5.3)
SODIUM SERPL-SCNC: 153 MEQ/L (ref 136–145)
WBC # BLD AUTO: 17.2 TH/MM3 (ref 4–11)

## 2018-02-14 RX ADMIN — PANTOPRAZOLE SCH MG: 40 TABLET, DELAYED RELEASE ORAL at 09:22

## 2018-02-14 RX ADMIN — TAZOBACTAM SODIUM AND PIPERACILLIN SODIUM SCH MLS/HR: 250; 2 INJECTION, SOLUTION INTRAVENOUS at 21:38

## 2018-02-14 RX ADMIN — HUMAN INSULIN SCH: 100 INJECTION, SOLUTION SUBCUTANEOUS at 21:56

## 2018-02-14 RX ADMIN — TAZOBACTAM SODIUM AND PIPERACILLIN SODIUM SCH MLS/HR: 250; 2 INJECTION, SOLUTION INTRAVENOUS at 04:02

## 2018-02-14 RX ADMIN — STANDARDIZED SENNA CONCENTRATE AND DOCUSATE SODIUM SCH TAB: 8.6; 5 TABLET, FILM COATED ORAL at 21:38

## 2018-02-14 RX ADMIN — HEPARIN SODIUM SCH UNITS: 10000 INJECTION, SOLUTION INTRAVENOUS; SUBCUTANEOUS at 09:23

## 2018-02-14 RX ADMIN — TAZOBACTAM SODIUM AND PIPERACILLIN SODIUM SCH MLS/HR: 250; 2 INJECTION, SOLUTION INTRAVENOUS at 16:02

## 2018-02-14 RX ADMIN — HUMAN INSULIN SCH: 100 INJECTION, SOLUTION SUBCUTANEOUS at 12:00

## 2018-02-14 RX ADMIN — OXYCODONE HYDROCHLORIDE AND ACETAMINOPHEN SCH MG: 500 TABLET ORAL at 09:23

## 2018-02-14 RX ADMIN — FERROUS SULFATE TAB 325 MG (65 MG ELEMENTAL FE) SCH MG: 325 (65 FE) TAB at 09:22

## 2018-02-14 RX ADMIN — Medication SCH ML: at 09:22

## 2018-02-14 RX ADMIN — SODIUM CHLORIDE SCH MLS/HR: 900 INJECTION INTRAVENOUS at 12:43

## 2018-02-14 RX ADMIN — HUMAN INSULIN SCH: 100 INJECTION, SOLUTION SUBCUTANEOUS at 08:00

## 2018-02-14 RX ADMIN — SODIUM CHLORIDE SCH MLS/HR: 234 INJECTION INTRAMUSCULAR; INTRAVENOUS; SUBCUTANEOUS at 03:04

## 2018-02-14 RX ADMIN — TAZOBACTAM SODIUM AND PIPERACILLIN SODIUM SCH MLS/HR: 250; 2 INJECTION, SOLUTION INTRAVENOUS at 09:21

## 2018-02-14 RX ADMIN — STANDARDIZED SENNA CONCENTRATE AND DOCUSATE SODIUM SCH TAB: 8.6; 5 TABLET, FILM COATED ORAL at 09:22

## 2018-02-14 RX ADMIN — Medication SCH ML: at 21:39

## 2018-02-14 RX ADMIN — ATORVASTATIN CALCIUM SCH MG: 20 TABLET, FILM COATED ORAL at 21:38

## 2018-02-14 RX ADMIN — HUMAN INSULIN SCH: 100 INJECTION, SOLUTION SUBCUTANEOUS at 17:21

## 2018-02-14 RX ADMIN — MORPHINE SULFATE PRN MG: 2 INJECTION, SOLUTION INTRAMUSCULAR; INTRAVENOUS at 03:02

## 2018-02-14 RX ADMIN — DEXTROSE MONOHYDRATE AND POTASSIUM CHLORIDE SCH MLS/HR: 5; .149 INJECTION, SOLUTION INTRAVENOUS at 13:43

## 2018-02-14 NOTE — HHI.PR
Subjective


Remarks


The patient was resting comfortably in bed.  She said that she hasn't eaten 

lunch yet.  She did not know that she had a nephrostomy tube placed.  Her 

questions were answered.  Discussed with nursing.





Objective


Vitals





Vital Signs








  Date Time  Temp Pulse Resp B/P (MAP) Pulse Ox O2 Delivery O2 Flow Rate FiO2


 


2/14/18 08:00 98.2 86 17 139/69 (92) 98   


 


2/14/18 04:00 98.6 82 18 120/78 (92) 95   


 


2/14/18 00:00 98.7 87 18 117/74 (88) 92   


 


2/13/18 22:00  84      


 


2/13/18 20:00 98.4 104 22 132/74 (93) 100   


 


2/13/18 20:00  104      


 


2/13/18 19:00     100 Room Air  


 


2/13/18 18:00  76      


 


2/13/18 16:00  84      


 


2/13/18 16:00 97.9 95 20 131/73 (92) 98   


 


2/13/18 14:00  76      














I/O      


 


 2/13/18 2/13/18 2/13/18 2/14/18 2/14/18 2/14/18





 07:00 15:00 23:00 07:00 15:00 23:00


 


Intake Total 1509 ml  410 ml 1050 ml  


 


Output Total 1750 ml  1700 ml 1450 ml  


 


Balance -241 ml  -1290 ml -400 ml  


 


      


 


Intake Oral 240 ml  360 ml 0 ml  


 


IV Total 1269 ml  50 ml 1050 ml  


 


Output Urine Total 1150 ml  800 ml 1450 ml  


 


Drainage Total 600 ml  900 ml   


 


# Bowel Movements 0  0 0  








Result Diagram:  


2/14/18 1110                                                                   

             2/13/18 0945





Imaging





Last Impressions








Chest X-Ray 2/12/18 1124 Signed





Impressions: 





 Service Date/Time:  Monday, February 12, 2018 11:39 - CONCLUSION:  1. Left IJ 





 central line tip in the very proximal SVC. No pneumothorax. 2. Minimal 

bibasilar 





 airspace disease, likely atelectasis.     Mario Del Rosario MD 


 


Head CT 2/12/18 0046 Signed





Impressions: 





 Service Date/Time:  Monday, February 12, 2018 01:17 - CONCLUSION:  No 





 significant change has occurred.       Elie Francois MD 


 


Abdomen/Pelvis CT 2/12/18 0046 Signed





Impressions: 





 Service Date/Time:  Monday, February 12, 2018 01:19 - CONCLUSION:  Stable 





 staghorn calculus on the right and nonobstructing left renal calculi as well 

as 





 bilateral renal masses. Calcified uterine fibroids. Diverticulosis without 





 diverticulitis. There is some air in the endometrial cavity which may be 

related 





 to recent instrumentation.     Elie Francois MD 








Objective Remarks


GENERAL: Resting in bed in no acute distress.


SKIN: Focused skin assessment warm/dry.  No rash.


HEAD: Atraumatic. Normocephalic. 


EYES: Pupils equal and round 3 mm bilaterally and reactive. No scleral icterus. 

No injection or drainage. 


ENT: No nasal bleeding or discharge.  Mucous membranes pink and moist.


NECK: Trachea midline. No JVD. 


CARDIOVASCULAR: Regular rate and rhythm S1, S2.  No S4.  No murmur. 


RESPIRATORY: No accessory muscle use. Clear to auscultation. Breath sounds 

equal bilaterally. 


GASTROINTESTINAL: Abdomen soft, non-tender, nondistended.  Hypoactive bowel 

sounds are appreciated.


: Right nephrostomy tube in place.


MUSCULOSKELETAL: No obvious deformities.  No significant peripheral edema. 


NEUROLOGICAL: Awake, minimally nonverbal. No obvious cranial nerve deficits.  

Motor grossly within normal limits.


Medications and IVs





Current Medications








 Medications


  (Trade)  Dose


 Ordered  Sig/[place]


 Route  Start Time


 Stop Time Status Last Admin


 


  (Lipitor)  20 mg  HS


 PO  2/12/18 21:00


    2/13/18 21:10


 


 


  (Tylenol)  650 mg  Q6H  PRN


 PO  2/12/18 02:45


    2/12/18 18:49


 


 


  (Morphine Inj)  2 mg  Q2H  PRN


 IV PUSH  2/12/18 02:45


    2/14/18 03:02


 


 


  (Zofran Inj)  4 mg  Q6H  PRN


 IV PUSH  2/12/18 02:45


     


 


 


 Miscellaneous


 Information  1  Q361D


 XX  2/12/18 02:45


     


 


 


  (Chlorhexidine


 2% Cloth)  3 pack


 Taper  DAILY@04


 TOP  2/12/18 04:00


 2/8/19 03:59   


 


 


  (Chlorhexidine


 2% Cloth)  3 pack  UNSCH  PRN


 TOP  2/12/18 02:45


     


 


 


  (Kaylee-Colace)  1 tab  BID


 PO  2/12/18 09:00


    2/14/18 09:22


 


 


  (Milk Of


 Magnesia Liq)  30 ml  Q12H  PRN


 PO  2/12/18 02:45


     


 


 


  (Senokot)  17.2 mg  Q12H  PRN


 PO  2/12/18 02:45


     


 


 


  (Dulcolax Supp)  10 mg  DAILY  PRN


 RECTAL  2/12/18 02:45


     


 


 


  (Lactulose Liq)  30 ml  DAILY  PRN


 PO  2/12/18 02:45


     


 


 


  (NS Flush)  2 ml  UNSCH  PRN


 IV FLUSH  2/12/18 02:45


     


 


 


  (NS Flush)  2 ml  BID


 IV FLUSH  2/12/18 09:00


    2/14/18 09:22


 


 


 Pharmacy Profile


 Note  0 ml @ 0


 mls/hr  UNSCH


 OTHER  2/12/18 02:45


     


 


 


 Piperacillin Sod/


 Tazobactam Sod  50 ml @ 


 100 mls/hr  Q6H


 IV  2/12/18 04:00


    2/14/18 09:21


 


 


  (NS Flush)    DAILY


 IV FLUSH  2/12/18 11:30


    2/14/18 09:22


 


 


  (NS Flush)    UNSCH  PRN


 IV FLUSH  2/12/18 11:30


     


 


 


  (Albuterol Neb)  2.5 mg  Q2HR NEB  PRN


 NEB  2/12/18 13:45


     


 


 


  (Cathflo


 Activase Inj)  2 mg  Q2H  PRN


 INTRACATH  2/13/18 12:00


     


 


 


  (Protonix)  40 mg  DAILY


 PO  2/14/18 09:00


    2/14/18 09:22


 


 


  (Heparin Inj)  5,000 units  Q12HR


 SQ  2/13/18 21:00


    2/14/18 09:23


 


 


  (Ferrous Sulfate)  325 mg  DAILY


 PO  2/14/18 09:00


    2/14/18 09:22


 


 


  (Vitamin C)  500 mg  DAILY


 PO  2/14/18 09:00


    2/14/18 09:23


 


 


  (D50w (Vial) Inj)  50 ml  UNSCH  PRN


 IV PUSH  2/13/18 10:00


     


 


 


  (Glucagon Inj)  1 mg  UNSCH  PRN


 OTHER  2/13/18 10:00


     


 


 


  (NovoLIN R


 SUPPLEMENTAL


 SCALE)  1  ACHS SLIDING  SCALE


 SQ  2/13/18 12:00


    2/13/18 21:11


 


 


 Vancomycin HCl


 1250 mg/Sodium


 Chloride  262.5 ml @ 


 250 mls/hr  Q24H


 IV  2/13/18 13:00


    2/13/18 13:00


 


 


 Miscellaneous


 Information  SPECIFIC LAB TO


 BE DRAWN:VANCO


 TROUGH DATE TO...  ONCE  ONCE


 .XX  2/16/18 12:45


 2/16/18 12:46   


 








Date of Insertion:  Feb 12, 2018


Line:  Central Venous Catheter


Side:  Left


Location:  Internal, Jugular





A/P


Assessment and Plan


Sepsis


History of pansensitive Proteus mirabilis urinary tract infection. ID consult 

appreciated. Group D enterococcus growing in urine.


- Currently piperacillin/tazobactam IV per ID.


- follow culture data.


- PT evaluate and treat.





Right staghorn calculus/ Nonobstructing left nephrolithiasis/ Renal masses


Status post percutaneous nephroureteral stent right-sided for right staghorn 

calculus/likely source of sepsis.


- Will need lithotripsy at some point in the future per urology.


- Maintain Ortega catheter.


- pain control with a bowel regimen.





Acute encephalopathy 


Secondary to severe sepsis/urinary tract source. CT brain 2/12 revealed no 

acute intracranial findings.


- treat underlying condition.


- avoid sedating meds.





HTN


Blood pressure well controlled at this time.


- Holding amlodipine 5 mg daily, lisinopril 20 daily light of hypotension/ 

acute kidney injury.  Resume when clinically indicated.





Hyperglycemia of critical illness


Recent A1c 5.1%.


- insulin sliding scale and Accu-Cheks.





Acute kidney injury


S/t above.


- treatment as above.


- Avoid nephrotoxic drugs.


- IVFs.


- Monitor BMP daily.  


- Pending urine eosinophils.  





Bilateral uterine fibroids/ Endometrial cancer


Noted on imaging.


- Outpatient follow-up with OBGYN.





Microcytic anemia


Status post 4 units PRBCs. Likely chronic disease.


- Monitor CBC daily.  


- Check coags.


- Pending reticulocyte count and peripheral smear.





Hypernatremia/ hypokalemia/ hypophosphatemia


On D5W with KCl.


- follow BMP BID for now.


- IV K-phos.





PPx: Heparin











Kamar Kahn DO Feb 14, 2018 12:10

## 2018-02-14 NOTE — HHI.PR
Subjective


Patient symptoms today


Pt seen and examined. Feeling better. s/p right PCNT





Objective


Vital Signs





Vital Signs








  Date Time  Temp Pulse Resp B/P (MAP) Pulse Ox O2 Delivery O2 Flow Rate FiO2


 


2/14/18 08:00 98.2 86 17 139/69 (92) 98   


 


2/14/18 04:00 98.6 82 18 120/78 (92) 95   


 


2/14/18 00:00 98.7 87 18 117/74 (88) 92   


 


2/13/18 22:00  84      


 


2/13/18 20:00 98.4 104 22 132/74 (93) 100   


 


2/13/18 20:00  104      


 


2/13/18 19:00     100 Room Air  


 


2/13/18 18:00  76      


 


2/13/18 16:00  84      


 


2/13/18 16:00 97.9 95 20 131/73 (92) 98   


 


2/13/18 14:00  76      














Intake & Output  


 


 2/14/18 2/14/18





 07:00 19:00


 


Intake Total 1220 ml 


 


Output Total 1850 ml 


 


Balance -630 ml 


 


  


 


Intake Oral 120 ml 


 


IV Total 1100 ml 


 


Output Urine Total 1450 ml 


 


Drainage Total 400 ml 


 


# Bowel Movements 0 








Result Diagram:  


2/14/18 1110                                                                   

             2/14/18 1110





Objective Remarks


Abd:soft,nt,nd


Urine: clearing


Right PCNT: derek urine





2/14


Abd:soft,nt,nd


Urine: clearing


Right PCNT: pink urine


Medications and IVs





Current Medications








 Medications


  (Trade)  Dose


 Ordered  Sig/[place]


 Route  Start Time


 Stop Time Status Last Admin


 


  (Lipitor)  20 mg  HS


 PO  2/12/18 21:00


    2/13/18 21:10


 


 


  (Tylenol)  650 mg  Q6H  PRN


 PO  2/12/18 02:45


    2/12/18 18:49


 


 


  (Morphine Inj)  2 mg  Q2H  PRN


 IV PUSH  2/12/18 02:45


    2/14/18 03:02


 


 


  (Zofran Inj)  4 mg  Q6H  PRN


 IV PUSH  2/12/18 02:45


     


 


 


 Miscellaneous


 Information  1  Q361D


 XX  2/12/18 02:45


     


 


 


  (Chlorhexidine


 2% Cloth)  3 pack


 Taper  DAILY@04


 TOP  2/12/18 04:00


 2/8/19 03:59   


 


 


  (Chlorhexidine


 2% Cloth)  3 pack  UNSCH  PRN


 TOP  2/12/18 02:45


     


 


 


  (Kaylee-Colace)  1 tab  BID


 PO  2/12/18 09:00


    2/14/18 09:22


 


 


  (Milk Of


 Magnesia Liq)  30 ml  Q12H  PRN


 PO  2/12/18 02:45


     


 


 


  (Senokot)  17.2 mg  Q12H  PRN


 PO  2/12/18 02:45


     


 


 


  (Dulcolax Supp)  10 mg  DAILY  PRN


 RECTAL  2/12/18 02:45


     


 


 


  (Lactulose Liq)  30 ml  DAILY  PRN


 PO  2/12/18 02:45


     


 


 


  (NS Flush)  2 ml  UNSCH  PRN


 IV FLUSH  2/12/18 02:45


     


 


 


  (NS Flush)  2 ml  BID


 IV FLUSH  2/12/18 09:00


    2/14/18 09:22


 


 


 Pharmacy Profile


 Note  0 ml @ 0


 mls/hr  UNSCH


 OTHER  2/12/18 02:45


     


 


 


 Piperacillin Sod/


 Tazobactam Sod  50 ml @ 


 100 mls/hr  Q6H


 IV  2/12/18 04:00


    2/14/18 09:21


 


 


  (NS Flush)    DAILY


 IV FLUSH  2/12/18 11:30


    2/14/18 09:22


 


 


  (NS Flush)    UNSCH  PRN


 IV FLUSH  2/12/18 11:30


     


 


 


  (Albuterol Neb)  2.5 mg  Q2HR NEB  PRN


 NEB  2/12/18 13:45


     


 


 


  (Cathflo


 Activase Inj)  2 mg  Q2H  PRN


 INTRACATH  2/13/18 12:00


     


 


 


  (Protonix)  40 mg  DAILY


 PO  2/14/18 09:00


    2/14/18 09:22


 


 


  (Heparin Inj)  5,000 units  Q12HR


 SQ  2/13/18 21:00


    2/14/18 09:23


 


 


  (Ferrous Sulfate)  325 mg  DAILY


 PO  2/14/18 09:00


    2/14/18 09:22


 


 


  (Vitamin C)  500 mg  DAILY


 PO  2/14/18 09:00


    2/14/18 09:23


 


 


  (D50w (Vial) Inj)  50 ml  UNSCH  PRN


 IV PUSH  2/13/18 10:00


     


 


 


  (Glucagon Inj)  1 mg  UNSCH  PRN


 OTHER  2/13/18 10:00


     


 


 


  (NovoLIN R


 SUPPLEMENTAL


 SCALE)  1  ACHS SLIDING  SCALE


 SQ  2/13/18 12:00


    2/13/18 21:11


 


 


 Vancomycin HCl


 1250 mg/Sodium


 Chloride  262.5 ml @ 


 250 mls/hr  Q24H


 IV  2/13/18 13:00


    2/14/18 12:43


 


 


 Miscellaneous


 Information  SPECIFIC LAB TO


 BE DRAWN:VANCO


 TROUGH DATE TO...  ONCE  ONCE


 .XX  2/16/18 12:45


 2/16/18 12:46   


 


 


 Potassium


 Chloride 20 meq/


 Dextrose  1,010 ml @ 


 100 mls/hr  Q10H6M


 IV  2/14/18 13:00


   UNV  


 


 


 Potassium


 Phosphate 30 mmol/


 Sodium Chloride  260 ml @ 


 43.333 mls/


 hr  ONCE  ONCE


 IV  2/14/18 13:00


 2/14/18 18:59 UNV  


 


 


  (K-Lyte Cl  Eff)  25 meq  ONCE  ONCE


 PO  2/14/18 13:00


 2/14/18 13:01 UNV  


 











Assessment and Plan


Assessment and Plan


72 y.o female with sepsis due to large right staghorn calculus s/p right PCNT 

with nephroureteral stent by IR yesterday


Continue page and Right PCNT drainage


Continue IV abx


Will need right PCNL as outpt.





2/14


72 y.o female with sepsis due to large right staghorn calculus s/p right PCNT 

with nephroureteral stent


Group D Enterococcus from Ucx. B/C NGTD


Continue IV Abx


Will need right PCNL as out pt. Maintain Right PCNT.











Rob Mazariegos DO Feb 14, 2018 12:55

## 2018-02-15 VITALS
SYSTOLIC BLOOD PRESSURE: 112 MMHG | DIASTOLIC BLOOD PRESSURE: 67 MMHG | TEMPERATURE: 96.9 F | RESPIRATION RATE: 20 BRPM | HEART RATE: 50 BPM | OXYGEN SATURATION: 100 %

## 2018-02-15 VITALS
RESPIRATION RATE: 18 BRPM | HEART RATE: 77 BPM | SYSTOLIC BLOOD PRESSURE: 129 MMHG | OXYGEN SATURATION: 94 % | DIASTOLIC BLOOD PRESSURE: 62 MMHG | TEMPERATURE: 98.5 F

## 2018-02-15 VITALS
TEMPERATURE: 97.3 F | HEART RATE: 64 BPM | DIASTOLIC BLOOD PRESSURE: 70 MMHG | RESPIRATION RATE: 20 BRPM | OXYGEN SATURATION: 99 % | SYSTOLIC BLOOD PRESSURE: 128 MMHG

## 2018-02-15 VITALS
SYSTOLIC BLOOD PRESSURE: 136 MMHG | RESPIRATION RATE: 19 BRPM | OXYGEN SATURATION: 100 % | DIASTOLIC BLOOD PRESSURE: 66 MMHG | HEART RATE: 60 BPM | TEMPERATURE: 97.6 F

## 2018-02-15 VITALS
TEMPERATURE: 98.7 F | HEART RATE: 77 BPM | RESPIRATION RATE: 19 BRPM | SYSTOLIC BLOOD PRESSURE: 124 MMHG | DIASTOLIC BLOOD PRESSURE: 80 MMHG | OXYGEN SATURATION: 100 %

## 2018-02-15 VITALS
OXYGEN SATURATION: 96 % | HEART RATE: 70 BPM | TEMPERATURE: 98.5 F | DIASTOLIC BLOOD PRESSURE: 81 MMHG | SYSTOLIC BLOOD PRESSURE: 142 MMHG | RESPIRATION RATE: 18 BRPM

## 2018-02-15 LAB
BUN SERPL-MCNC: 11 MG/DL (ref 7–18)
BUN SERPL-MCNC: 12 MG/DL (ref 7–18)
CALCIUM SERPL-MCNC: 9.8 MG/DL (ref 8.5–10.1)
CALCIUM SERPL-MCNC: 9.9 MG/DL (ref 8.5–10.1)
CHLORIDE SERPL-SCNC: 115 MEQ/L (ref 98–107)
CHLORIDE SERPL-SCNC: 118 MEQ/L (ref 98–107)
CREAT SERPL-MCNC: 0.87 MG/DL (ref 0.5–1)
CREAT SERPL-MCNC: 0.93 MG/DL (ref 0.5–1)
ERYTHROCYTE [DISTWIDTH] IN BLOOD BY AUTOMATED COUNT: 20.2 % (ref 11.6–17.2)
GFR SERPLBLD BASED ON 1.73 SQ M-ARVRAT: 72 ML/MIN (ref 89–?)
GFR SERPLBLD BASED ON 1.73 SQ M-ARVRAT: 77 ML/MIN (ref 89–?)
GLUCOSE SERPL-MCNC: 142 MG/DL (ref 74–106)
GLUCOSE SERPL-MCNC: 158 MG/DL (ref 74–106)
HCO3 BLD-SCNC: 24.8 MEQ/L (ref 21–32)
HCO3 BLD-SCNC: 25.4 MEQ/L (ref 21–32)
HCT VFR BLD CALC: 28.8 % (ref 35–46)
HGB BLD-MCNC: 9.4 GM/DL (ref 11.6–15.3)
MAGNESIUM SERPL-MCNC: 1.7 MG/DL (ref 1.5–2.5)
MCH RBC QN AUTO: 27.7 PG (ref 27–34)
MCHC RBC AUTO-ENTMCNC: 32.5 % (ref 32–36)
MCV RBC AUTO: 85.1 FL (ref 80–100)
PHOSPHATE SERPL-MCNC: 2.3 MG/DL (ref 2.5–4.9)
PLATELET # BLD: 168 TH/MM3 (ref 150–450)
PMV BLD AUTO: 7.5 FL (ref 7–11)
RBC # BLD AUTO: 3.39 MIL/MM3 (ref 4–5.3)
SODIUM SERPL-SCNC: 145 MEQ/L (ref 136–145)
SODIUM SERPL-SCNC: 149 MEQ/L (ref 136–145)
WBC # BLD AUTO: 12 TH/MM3 (ref 4–11)

## 2018-02-15 RX ADMIN — STANDARDIZED SENNA CONCENTRATE AND DOCUSATE SODIUM SCH TAB: 8.6; 5 TABLET, FILM COATED ORAL at 20:47

## 2018-02-15 RX ADMIN — TAZOBACTAM SODIUM AND PIPERACILLIN SODIUM SCH MLS/HR: 250; 2 INJECTION, SOLUTION INTRAVENOUS at 04:46

## 2018-02-15 RX ADMIN — SODIUM CHLORIDE SCH MLS/HR: 900 INJECTION INTRAVENOUS at 05:27

## 2018-02-15 RX ADMIN — HUMAN INSULIN SCH: 100 INJECTION, SOLUTION SUBCUTANEOUS at 12:00

## 2018-02-15 RX ADMIN — CHLORHEXIDINE GLUCONATE SCH PACK: 500 CLOTH TOPICAL at 04:00

## 2018-02-15 RX ADMIN — SODIUM CHLORIDE SCH MLS/HR: 234 INJECTION INTRAMUSCULAR; INTRAVENOUS; SUBCUTANEOUS at 20:50

## 2018-02-15 RX ADMIN — DEXTROSE MONOHYDRATE AND POTASSIUM CHLORIDE SCH MLS/HR: 5; .149 INJECTION, SOLUTION INTRAVENOUS at 00:33

## 2018-02-15 RX ADMIN — TAZOBACTAM SODIUM AND PIPERACILLIN SODIUM SCH MLS/HR: 250; 2 INJECTION, SOLUTION INTRAVENOUS at 08:37

## 2018-02-15 RX ADMIN — Medication SCH ML: at 08:36

## 2018-02-15 RX ADMIN — OXYCODONE HYDROCHLORIDE AND ACETAMINOPHEN SCH MG: 500 TABLET ORAL at 08:37

## 2018-02-15 RX ADMIN — HUMAN INSULIN SCH: 100 INJECTION, SOLUTION SUBCUTANEOUS at 17:00

## 2018-02-15 RX ADMIN — SODIUM CHLORIDE SCH MLS/HR: 234 INJECTION INTRAMUSCULAR; INTRAVENOUS; SUBCUTANEOUS at 12:30

## 2018-02-15 RX ADMIN — STANDARDIZED SENNA CONCENTRATE AND DOCUSATE SODIUM SCH TAB: 8.6; 5 TABLET, FILM COATED ORAL at 08:37

## 2018-02-15 RX ADMIN — PANTOPRAZOLE SCH MG: 40 TABLET, DELAYED RELEASE ORAL at 08:37

## 2018-02-15 RX ADMIN — HUMAN INSULIN SCH: 100 INJECTION, SOLUTION SUBCUTANEOUS at 08:00

## 2018-02-15 RX ADMIN — TAZOBACTAM SODIUM AND PIPERACILLIN SODIUM SCH MLS/HR: 250; 2 INJECTION, SOLUTION INTRAVENOUS at 20:47

## 2018-02-15 RX ADMIN — ATORVASTATIN CALCIUM SCH MG: 20 TABLET, FILM COATED ORAL at 20:47

## 2018-02-15 RX ADMIN — FERROUS SULFATE TAB 325 MG (65 MG ELEMENTAL FE) SCH MG: 325 (65 FE) TAB at 08:37

## 2018-02-15 RX ADMIN — ONDANSETRON PRN MG: 2 INJECTION, SOLUTION INTRAMUSCULAR; INTRAVENOUS at 20:54

## 2018-02-15 RX ADMIN — Medication SCH ML: at 20:55

## 2018-02-15 RX ADMIN — HUMAN INSULIN SCH: 100 INJECTION, SOLUTION SUBCUTANEOUS at 20:48

## 2018-02-15 RX ADMIN — TAZOBACTAM SODIUM AND PIPERACILLIN SODIUM SCH MLS/HR: 250; 2 INJECTION, SOLUTION INTRAVENOUS at 16:55

## 2018-02-15 NOTE — PD.CONS
HPI


History of Present Illness


This is a 72 year old female with recently diagnosed endometrial cancer who 

presented with AMS, fevers, hypotension and was found to be septic with UTI.  

GI has been consulted for BRBPR and anemia requiring blood transfusion.  Per 

report she has been having rectal bleeding although pt herself is unaware of 

this.  SHe does c/o of lower abd pain and right side pain.  SHe says she did 

have some nausea and nonbloody emesis this morning.  Per pt's daughter she has 

had no colonoscopy recently but was supposed to have one.  SHe is not on any 

blood thinners.  Pt is poor and unreliable historian.  Hx obtained from EMR and 

pts daughter.


 (Annamaria Lindsey)





PFSH


Past Medical History


endometrial ca


HTN


Past Surgical History


tubal ligation


 (Annamaria Lindsey)


Coded Allergies:  


     No Known Allergies (Unverified  Allergy, Unknown, 1/19/18)


Family History


unk


Social History


no toxic habits


 (Annamaria Lindsey)





Review of Systems


Gastrointestinal:  COMPLAINS OF: Abdominal pain, Nausea, Vomiting, DENIES: 

Hematemesis


otherwise noncontributory


 (Annamaria Lindsey)





GI Exam


Vitals I&O





Vital Signs








  Date Time  Temp Pulse Resp B/P (MAP) Pulse Ox O2 Delivery O2 Flow Rate FiO2


 


2/15/18 08:00 96.9 50 20 112/67 (82) 100   


 


2/15/18 04:01 97.3 64 20 128/70 (89) 99   


 


2/15/18 00:00  77      


 


2/15/18 00:00 98.5 79 18 129/62 (84) 94   


 


2/14/18 21:40      Room Air  


 


2/14/18 20:00 97.8 80 18 130/73 (92) 98   


 


2/14/18 20:00  73      


 


2/14/18 16:00 98.5 80 17 117/58 (77) 97   


 


2/14/18 12:00 97.8 87 17 129/73 (91) 98   


 


2/14/18 12:00  86      














I/O      


 


 2/14/18 2/14/18 2/14/18 2/15/18 2/15/18 2/15/18





 07:00 15:00 23:00 07:00 15:00 23:00


 


Intake Total 1050 ml 1000 ml 672.5 ml 1000 ml 120 ml 


 


Output Total 1450 ml 375 ml 975 ml 1250 ml  


 


Balance -400 ml 625 ml -302.5 ml -250 ml 120 ml 


 


      


 


Intake Oral 0 ml  360 ml  120 ml 


 


IV Total 1050 ml 1000 ml 312.5 ml 1000 ml  


 


Output Urine Total 1450 ml  500 ml 350 ml  


 


Drainage Total  375 ml 475 ml 900 ml  


 


# Bowel Movements 0 1 2   








Imaging





Last Impressions








Chest X-Ray 2/12/18 1124 Signed





Impressions: 





 Service Date/Time:  Monday, February 12, 2018 11:39 - CONCLUSION:  1. Left IJ 





 central line tip in the very proximal SVC. No pneumothorax. 2. Minimal 

bibasilar 





 airspace disease, likely atelectasis.     Mario Del Rosario MD 


 


Head CT 2/12/18 0046 Signed





Impressions: 





 Service Date/Time:  Monday, February 12, 2018 01:17 - CONCLUSION:  No 





 significant change has occurred.       Elie Francois MD 


 


Abdomen/Pelvis CT 2/12/18 0046 Signed





Impressions: 





 Service Date/Time:  Monday, February 12, 2018 01:19 - CONCLUSION:  Stable 





 staghorn calculus on the right and nonobstructing left renal calculi as well 

as 





 bilateral renal masses. Calcified uterine fibroids. Diverticulosis without 





 diverticulitis. There is some air in the endometrial cavity which may be 

related 





 to recent instrumentation.     Elie Francois MD 








Laboratory











Test


  2/15/18


00:00 2/15/18


04:50


 


Blood Urea Nitrogen 12 MG/DL  11 MG/DL 


 


Creatinine 0.93 MG/DL  0.87 MG/DL 


 


Random Glucose 158 MG/DL  142 MG/DL 


 


Calcium Level 9.9 MG/DL  9.8 MG/DL 


 


Sodium Level 149 MEQ/L  145 MEQ/L 


 


Potassium Level 4.2 MEQ/L  4.0 MEQ/L 


 


Chloride Level 118 MEQ/L  115 MEQ/L 


 


Carbon Dioxide Level 25.4 MEQ/L  24.8 MEQ/L 


 


Anion Gap 6 MEQ/L  5 MEQ/L 


 


Estimat Glomerular Filtration


Rate 72 ML/MIN 


  77 ML/MIN 


 


 


White Blood Count  12.0 TH/MM3 


 


Red Blood Count  3.39 MIL/MM3 


 


Hemoglobin  9.4 GM/DL 


 


Hematocrit  28.8 % 


 


Mean Corpuscular Volume  85.1 FL 


 


Mean Corpuscular Hemoglobin  27.7 PG 


 


Mean Corpuscular Hemoglobin


Concent 


  32.5 % 


 


 


Red Cell Distribution Width  20.2 % 


 


Platelet Count  168 TH/MM3 


 


Mean Platelet Volume  7.5 FL 


 


Phosphorus Level  2.3 MG/DL 


 


Magnesium Level  1.7 MG/DL 














 Date/Time


Source Procedure


Growth Status


 


 


 2/12/18 10:24


Blood Peripheral Aerobic Blood Culture - Preliminary


NO GROWTH IN 3 DAYS Resulted


 


 2/12/18 10:24


Blood Peripheral Anaerobic Blood Culture - Preliminary


NO GROWTH IN 3 DAYS Resulted


 


 2/12/18 00:51


Urine Catheterized Urine Urine Culture - Preliminary


Group D Enterococcus Resulted








Physical Examination


HEENT: PERRL; normocephalic; atraumatic; no jaundice.  


CHEST:  CTA


CARDIAC:  irregular HR.


ABDOMEN:  Soft, nondistended, lower abd TTP, no hepatosplenomegaly; bowel 

sounds are present in all four quadrants.


EXTREMITIES: No clubbing, cyanosis, or edema.


SKIN:  Normal; no rash; no jaundice.


CNS:  slow to answer but oriented x 3


 (Annamaria Lindsey)





Assessment and Plan


Plan


ASSESSMENT


- BRBPR - LGIB.  no recent colonoscopy.  limited hx. hgb 6.0 on admission, now s

/p 4 x PRBC and trending down


    d/w pts daughter who says she assists with healthcare decisions and is 

agreeable to proceed with colonoscopy


- anemia - microcytic. likely multifactorial and in part 2/2 above.  


- endometrial ca





PLAN


- colonoscopy in am


- obtain consent


- clears today


- NPO after MN


- mg citreate prep


- monitor HH


- transfuse as needed


- further recs to follow


pt seen by myself and Dr Marks and this note is written on his behalf


 (Annamaria Lindsey)


Physician Comments


Patient seen and examined


Agree with above


Continue with current supportive care


Monitor labs


Plan for colonoscopy tomorrow


 (Sebas Marks MD)











Annamaria Lindsey Feb 15, 2018 11:29


Sebas Marks MD Feb 15, 2018 21:59

## 2018-02-15 NOTE — HHI.PR
Subjective


Patient symptoms today


Pt seen and examined. Feels well. No complaints





Objective


Vital Signs





Vital Signs








  Date Time  Temp Pulse Resp B/P (MAP) Pulse Ox O2 Delivery O2 Flow Rate FiO2


 


2/15/18 04:01 97.3 64 20 128/70 (89) 99   


 


2/15/18 00:00  77      


 


2/15/18 00:00 98.5 79 18 129/62 (84) 94   


 


2/14/18 21:40      Room Air  


 


2/14/18 20:00 97.8 80 18 130/73 (92) 98   


 


2/14/18 20:00  73      


 


2/14/18 16:00 98.5 80 17 117/58 (77) 97   


 


2/14/18 12:00 97.8 87 17 129/73 (91) 98   


 


2/14/18 12:00  86      














Intake & Output  


 


 2/15/18 2/15/18





 07:00 19:00


 


Intake Total 1050 ml 


 


Output Total 1250 ml 


 


Balance -200 ml 


 


  


 


IV Total 1050 ml 


 


Output Urine Total 350 ml 


 


Drainage Total 900 ml 








Result Diagram:  


2/15/18 0450                                                                   

             2/15/18 0450





Objective Remarks


Abd:soft,nt,nd


Urine: clearing


Right PCNT: derek urine





2/14


Abd:soft,nt,nd


Urine: clearing


Right PCNT: pink urine





2/15


Abd:soft,nt,nd


Urine: clearing


Right PCNT: pink urine


Medications and IVs





Current Medications








 Medications


  (Trade)  Dose


 Ordered  Sig/[place]


 Route  Start Time


 Stop Time Status Last Admin


 


  (Lipitor)  20 mg  HS


 PO  2/12/18 21:00


    2/14/18 21:38


 


 


  (Tylenol)  650 mg  Q6H  PRN


 PO  2/12/18 02:45


    2/12/18 18:49


 


 


  (Morphine Inj)  2 mg  Q2H  PRN


 IV PUSH  2/12/18 02:45


    2/14/18 03:02


 


 


  (Zofran Inj)  4 mg  Q6H  PRN


 IV PUSH  2/12/18 02:45


     


 


 


 Miscellaneous


 Information  1  Q361D


 XX  2/12/18 02:45


     


 


 


  (Chlorhexidine


 2% Cloth)  3 pack


 Taper  DAILY@04


 TOP  2/12/18 04:00


 2/8/19 03:59   


 


 


  (Chlorhexidine


 2% Cloth)  3 pack  UNSCH  PRN


 TOP  2/12/18 02:45


     


 


 


  (Kaylee-Colace)  1 tab  BID


 PO  2/12/18 09:00


    2/14/18 21:38


 


 


  (Milk Of


 Magnesia Liq)  30 ml  Q12H  PRN


 PO  2/12/18 02:45


     


 


 


  (Senokot)  17.2 mg  Q12H  PRN


 PO  2/12/18 02:45


     


 


 


  (Dulcolax Supp)  10 mg  DAILY  PRN


 RECTAL  2/12/18 02:45


     


 


 


  (Lactulose Liq)  30 ml  DAILY  PRN


 PO  2/12/18 02:45


     


 


 


  (NS Flush)  2 ml  UNSCH  PRN


 IV FLUSH  2/12/18 02:45


     


 


 


  (NS Flush)  2 ml  BID


 IV FLUSH  2/12/18 09:00


    2/14/18 21:39


 


 


 Pharmacy Profile


 Note  0 ml @ 0


 mls/hr  UNSCH


 OTHER  2/12/18 02:45


     


 


 


 Piperacillin Sod/


 Tazobactam Sod  50 ml @ 


 100 mls/hr  Q6H


 IV  2/12/18 04:00


    2/15/18 04:46


 


 


  (NS Flush)    DAILY


 IV FLUSH  2/12/18 11:30


    2/14/18 09:22


 


 


  (NS Flush)    UNSCH  PRN


 IV FLUSH  2/12/18 11:30


     


 


 


  (Albuterol Neb)  2.5 mg  Q2HR NEB  PRN


 NEB  2/12/18 13:45


     


 


 


  (Cathflo


 Activase Inj)  2 mg  Q2H  PRN


 INTRACATH  2/13/18 12:00


     


 


 


  (Protonix)  40 mg  DAILY


 PO  2/14/18 09:00


    2/14/18 09:22


 


 


  (Heparin Inj)  5,000 units  Q12HR


 SQ  2/13/18 21:00


   Future Hold 2/14/18 09:23


 


 


  (Ferrous Sulfate)  325 mg  DAILY


 PO  2/14/18 09:00


    2/14/18 09:22


 


 


  (Vitamin C)  500 mg  DAILY


 PO  2/14/18 09:00


    2/14/18 09:23


 


 


  (D50w (Vial) Inj)  50 ml  UNSCH  PRN


 IV PUSH  2/13/18 10:00


     


 


 


  (Glucagon Inj)  1 mg  UNSCH  PRN


 OTHER  2/13/18 10:00


     


 


 


  (NovoLIN R


 SUPPLEMENTAL


 SCALE)  1  ACHS SLIDING  SCALE


 SQ  2/13/18 12:00


    2/14/18 21:56


 


 


 Vancomycin HCl


 1500 mg/Sodium


 Chloride  515 ml @ 


 250 mls/hr  Q18H


 IV  2/15/18 06:00


    2/15/18 05:27


 


 


 Miscellaneous


 Information  SPECIFIC LAB TO BE


 DRAWN:VANCOMYCIN


 TROUGH DATE TO...  ONCE  ONCE


 .XX  2/16/18 17:45


 2/16/18 17:46   


 


 


 Potassium


 Chloride/Dextrose  1,000 ml @ 


 100 mls/hr  Q10H


 IV  2/14/18 14:00


    2/15/18 00:33


 











Assessment and Plan


Assessment and Plan


72 y.o female with sepsis due to large right staghorn calculus s/p right PCNT 

with nephroureteral stent by IR yesterday


Continue page and Right PCNT drainage


Continue IV abx


Will need right PCNL as outpt.





2/14


72 y.o female with sepsis due to large right staghorn calculus s/p right PCNT 

with nephroureteral stent


Group D Enterococcus from Ucx. B/C NGTD


Continue IV Abx


Will need right PCNL as out pt. Maintain Right PCNT.





2/15


2/14


72 y.o female with sepsis due to large right staghorn calculus s/p right PCNT 

with nephroureteral stent


Rigth PCNL as outpt. Maintain right PCNT


Page can be removed at time of discharge


Continue Abx











Rob Mazariegos DO Feb 15, 2018 08:24

## 2018-02-15 NOTE — HHI.PR
Subjective


Remarks


The patient was resting in bed comfortably.  She said that she wanted ice 

cream. She did not recall having a bloody bowel movement. Discussed with 

nursing.





Objective


Vitals





Vital Signs








  Date Time  Temp Pulse Resp B/P (MAP) Pulse Ox O2 Delivery O2 Flow Rate FiO2


 


2/15/18 08:00 96.9 50 20 112/67 (82) 100   


 


2/15/18 04:01 97.3 64 20 128/70 (89) 99   


 


2/15/18 00:00  77      


 


2/15/18 00:00 98.5 79 18 129/62 (84) 94   


 


2/14/18 21:40      Room Air  


 


2/14/18 20:00 97.8 80 18 130/73 (92) 98   


 


2/14/18 20:00  73      


 


2/14/18 16:00 98.5 80 17 117/58 (77) 97   


 


2/14/18 12:00 97.8 87 17 129/73 (91) 98   


 


2/14/18 12:00  86      














I/O      


 


 2/14/18 2/14/18 2/14/18 2/15/18 2/15/18 2/15/18





 07:00 15:00 23:00 07:00 15:00 23:00


 


Intake Total 1050 ml 1000 ml 672.5 ml 1000 ml 120 ml 


 


Output Total 1450 ml 375 ml 975 ml 1250 ml  


 


Balance -400 ml 625 ml -302.5 ml -250 ml 120 ml 


 


      


 


Intake Oral 0 ml  360 ml  120 ml 


 


IV Total 1050 ml 1000 ml 312.5 ml 1000 ml  


 


Output Urine Total 1450 ml  500 ml 350 ml  


 


Drainage Total  375 ml 475 ml 900 ml  


 


# Bowel Movements 0 1 2   








Result Diagram:  


2/15/18 0450                                                                   

             2/15/18 0450





Imaging





Last Impressions








Chest X-Ray 2/12/18 1124 Signed





Impressions: 





 Service Date/Time:  Monday, February 12, 2018 11:39 - CONCLUSION:  1. Left IJ 





 central line tip in the very proximal SVC. No pneumothorax. 2. Minimal 

bibasilar 





 airspace disease, likely atelectasis.     Mario Del Rosario MD 


 


Head CT 2/12/18 0046 Signed





Impressions: 





 Service Date/Time:  Monday, February 12, 2018 01:17 - CONCLUSION:  No 





 significant change has occurred.       Elie Francois MD 


 


Abdomen/Pelvis CT 2/12/18 0046 Signed





Impressions: 





 Service Date/Time:  Monday, February 12, 2018 01:19 - CONCLUSION:  Stable 





 staghorn calculus on the right and nonobstructing left renal calculi as well 

as 





 bilateral renal masses. Calcified uterine fibroids. Diverticulosis without 





 diverticulitis. There is some air in the endometrial cavity which may be 

related 





 to recent instrumentation.     Elie Francois MD 








Objective Remarks


GENERAL: Resting in bed in no acute distress.


SKIN: Focused skin assessment warm/dry.  No rash.


HEAD: Atraumatic. Normocephalic. 


EYES: No scleral icterus. No injection or drainage. 


ENT: No nasal bleeding or discharge.  Mucous membranes pink and moist.


NECK: Trachea midline. No JVD. 


CARDIOVASCULAR: Regular rate and rhythm S1, S2.  No S4.  No murmur. 


RESPIRATORY: No accessory muscle use. Clear to auscultation. Breath sounds 

equal bilaterally. 


GASTROINTESTINAL: Abdomen soft, non-tender, nondistended.  Hypoactive bowel 

sounds are appreciated.


: Right nephrostomy tube in place. Ortega in place. 


MUSCULOSKELETAL: No obvious deformities.  No significant peripheral edema. 


NEUROLOGICAL: Awake, minimally nonverbal. No obvious cranial nerve deficits.  

Motor grossly within normal limits.


Medications and IVs





Current Medications








 Medications


  (Trade)  Dose


 Ordered  Sig/[place]


 Route  Start Time


 Stop Time Status Last Admin


 


  (Lipitor)  20 mg  HS


 PO  2/12/18 21:00


    2/14/18 21:38


 


 


  (Tylenol)  650 mg  Q6H  PRN


 PO  2/12/18 02:45


    2/12/18 18:49


 


 


  (Morphine Inj)  2 mg  Q2H  PRN


 IV PUSH  2/12/18 02:45


    2/14/18 03:02


 


 


  (Zofran Inj)  4 mg  Q6H  PRN


 IV PUSH  2/12/18 02:45


     


 


 


 Miscellaneous


 Information  1  Q361D


 XX  2/12/18 02:45


     


 


 


  (Chlorhexidine


 2% Cloth)  3 pack


 Taper  DAILY@04


 TOP  2/12/18 04:00


 2/8/19 03:59   


 


 


  (Chlorhexidine


 2% Cloth)  3 pack  UNSCH  PRN


 TOP  2/12/18 02:45


     


 


 


  (Kaylee-Colace)  1 tab  BID


 PO  2/12/18 09:00


    2/15/18 08:37


 


 


  (Milk Of


 Magnesia Liq)  30 ml  Q12H  PRN


 PO  2/12/18 02:45


     


 


 


  (Senokot)  17.2 mg  Q12H  PRN


 PO  2/12/18 02:45


     


 


 


  (Dulcolax Supp)  10 mg  DAILY  PRN


 RECTAL  2/12/18 02:45


     


 


 


  (Lactulose Liq)  30 ml  DAILY  PRN


 PO  2/12/18 02:45


     


 


 


  (NS Flush)  2 ml  UNSCH  PRN


 IV FLUSH  2/12/18 02:45


     


 


 


  (NS Flush)  2 ml  BID


 IV FLUSH  2/12/18 09:00


    2/14/18 21:39


 


 


 Pharmacy Profile


 Note  0 ml @ 0


 mls/hr  UNSCH


 OTHER  2/12/18 02:45


     


 


 


 Piperacillin Sod/


 Tazobactam Sod  50 ml @ 


 100 mls/hr  Q6H


 IV  2/12/18 04:00


    2/15/18 08:37


 


 


  (NS Flush)    DAILY


 IV FLUSH  2/12/18 11:30


    2/14/18 09:22


 


 


  (NS Flush)    UNSCH  PRN


 IV FLUSH  2/12/18 11:30


     


 


 


  (Albuterol Neb)  2.5 mg  Q2HR NEB  PRN


 NEB  2/12/18 13:45


     


 


 


  (Cathflo


 Activase Inj)  2 mg  Q2H  PRN


 INTRACATH  2/13/18 12:00


     


 


 


  (Protonix)  40 mg  DAILY


 PO  2/14/18 09:00


    2/15/18 08:37


 


 


  (Heparin Inj)  5,000 units  Q12HR


 SQ  2/13/18 21:00


   Future Hold 2/14/18 09:23


 


 


  (Ferrous Sulfate)  325 mg  DAILY


 PO  2/14/18 09:00


    2/15/18 08:37


 


 


  (Vitamin C)  500 mg  DAILY


 PO  2/14/18 09:00


    2/15/18 08:37


 


 


  (D50w (Vial) Inj)  50 ml  UNSCH  PRN


 IV PUSH  2/13/18 10:00


     


 


 


  (Glucagon Inj)  1 mg  UNSCH  PRN


 OTHER  2/13/18 10:00


     


 


 


  (NovoLIN R


 SUPPLEMENTAL


 SCALE)  1  ACHS SLIDING  SCALE


 SQ  2/13/18 12:00


    2/14/18 21:56


 


 


 Vancomycin HCl


 1500 mg/Sodium


 Chloride  515 ml @ 


 250 mls/hr  Q18H


 IV  2/15/18 06:00


    2/15/18 05:27


 


 


 Miscellaneous


 Information  SPECIFIC LAB TO BE


 DRAWN:VANCOMYCIN


 TROUGH DATE TO...  ONCE  ONCE


 .XX  2/16/18 17:45


 2/16/18 17:46   


 


 


 Potassium


 Chloride/Dextrose  1,000 ml @ 


 100 mls/hr  Q10H


 IV  2/14/18 14:00


    2/15/18 00:33


 


 


  (Citroma Liq)  300 ml  ONCE  ONCE


 PO  2/15/18 16:30


 2/15/18 16:31 UNV  


 


 


  (Citroma Liq)  300 ml  ONCE  ONCE


 PO  2/15/18 18:30


 2/15/18 18:31 UNV  


 








Date of Insertion:  Feb 12, 2018


Line:  Central Venous Catheter


Side:  Left


Location:  Internal, Jugular





A/P


Assessment and Plan


Sepsis


History of pansensitive Proteus mirabilis urinary tract infection. ID consult 

appreciated. Group D enterococcus growing in urine.


- Currently piperacillin/tazobactam IV per ID.


- follow culture data.


- PT/ OT evaluate and treat.





Right staghorn calculus/ Nonobstructing left nephrolithiasis/ Renal masses


Status post percutaneous nephroureteral stent right-sided for right staghorn 

calculus/likely source of sepsis.


- Will need lithotripsy at some point in the future per urology.


- Maintain Ortega catheter.


- pain control with a bowel regimen.





Acute encephalopathy 


Secondary to severe sepsis/urinary tract source. CT brain 2/12 revealed no 

acute intracranial findings. Improving.


- treat underlying condition.


- avoid sedating meds.





HTN


Blood pressure well controlled at this time.


- Holding amlodipine 5 mg daily, lisinopril 20 daily light of hypotension/ 

acute kidney injury.  Resume when clinically indicated.





Hyperglycemia of critical illness


Recent A1c 5.1%.


- insulin sliding scale and Accu-Cheks.





Acute kidney injury


S/t above.


- treatment as above.


- Avoid nephrotoxic drugs.


- IVFs.


- Monitor BMP daily.  


- Pending urine eosinophils.  





Bilateral uterine fibroids/ Endometrial cancer


Noted on imaging.


- Outpatient follow-up with OBGYN.





Microcytic anemia/ GIB


Status post 4 units PRBCs. BRBPR noted by nursing.


- Monitor CBC daily.  


- GI eval pending.





Hypernatremia/ hypokalemia/ hypophosphatemia


S/t decreased PO intake.


- follow BMP. Improved. 


- IV K-phos.





PPx: Heparin











Kamar Kahn DO Feb 15, 2018 11:43

## 2018-02-16 VITALS
TEMPERATURE: 96.9 F | SYSTOLIC BLOOD PRESSURE: 143 MMHG | RESPIRATION RATE: 20 BRPM | HEART RATE: 58 BPM | OXYGEN SATURATION: 98 % | DIASTOLIC BLOOD PRESSURE: 69 MMHG

## 2018-02-16 VITALS — HEART RATE: 49 BPM

## 2018-02-16 VITALS
DIASTOLIC BLOOD PRESSURE: 80 MMHG | SYSTOLIC BLOOD PRESSURE: 166 MMHG | HEART RATE: 64 BPM | TEMPERATURE: 96.5 F | OXYGEN SATURATION: 99 % | RESPIRATION RATE: 20 BRPM

## 2018-02-16 VITALS
TEMPERATURE: 97.6 F | DIASTOLIC BLOOD PRESSURE: 77 MMHG | RESPIRATION RATE: 18 BRPM | SYSTOLIC BLOOD PRESSURE: 130 MMHG | OXYGEN SATURATION: 98 % | HEART RATE: 65 BPM

## 2018-02-16 VITALS
SYSTOLIC BLOOD PRESSURE: 133 MMHG | RESPIRATION RATE: 20 BRPM | OXYGEN SATURATION: 96 % | TEMPERATURE: 97.7 F | DIASTOLIC BLOOD PRESSURE: 76 MMHG | HEART RATE: 63 BPM

## 2018-02-16 VITALS
TEMPERATURE: 98.2 F | HEART RATE: 58 BPM | SYSTOLIC BLOOD PRESSURE: 142 MMHG | OXYGEN SATURATION: 100 % | DIASTOLIC BLOOD PRESSURE: 79 MMHG | RESPIRATION RATE: 19 BRPM

## 2018-02-16 VITALS
SYSTOLIC BLOOD PRESSURE: 143 MMHG | DIASTOLIC BLOOD PRESSURE: 73 MMHG | OXYGEN SATURATION: 95 % | TEMPERATURE: 97.8 F | RESPIRATION RATE: 19 BRPM | HEART RATE: 59 BPM

## 2018-02-16 VITALS — HEART RATE: 96 BPM

## 2018-02-16 VITALS — HEART RATE: 66 BPM

## 2018-02-16 LAB
BUN SERPL-MCNC: 6 MG/DL (ref 7–18)
CALCIUM SERPL-MCNC: 9.9 MG/DL (ref 8.5–10.1)
CHLORIDE SERPL-SCNC: 115 MEQ/L (ref 98–107)
CREAT SERPL-MCNC: 0.66 MG/DL (ref 0.5–1)
ERYTHROCYTE [DISTWIDTH] IN BLOOD BY AUTOMATED COUNT: 19.6 % (ref 11.6–17.2)
GFR SERPLBLD BASED ON 1.73 SQ M-ARVRAT: 106 ML/MIN (ref 89–?)
GLUCOSE SERPL-MCNC: 109 MG/DL (ref 74–106)
HCO3 BLD-SCNC: 24.6 MEQ/L (ref 21–32)
HCT VFR BLD CALC: 29.4 % (ref 35–46)
HGB BLD-MCNC: 9.6 GM/DL (ref 11.6–15.3)
MAGNESIUM SERPL-MCNC: 2 MG/DL (ref 1.5–2.5)
MCH RBC QN AUTO: 27.8 PG (ref 27–34)
MCHC RBC AUTO-ENTMCNC: 32.8 % (ref 32–36)
MCV RBC AUTO: 84.6 FL (ref 80–100)
PLATELET # BLD: 109 TH/MM3 (ref 150–450)
PMV BLD AUTO: 8.1 FL (ref 7–11)
RBC # BLD AUTO: 3.48 MIL/MM3 (ref 4–5.3)
SODIUM SERPL-SCNC: 148 MEQ/L (ref 136–145)
WBC # BLD AUTO: 8.5 TH/MM3 (ref 4–11)

## 2018-02-16 PROCEDURE — 0DJD8ZZ INSPECTION OF LOWER INTESTINAL TRACT, VIA NATURAL OR ARTIFICIAL OPENING ENDOSCOPIC: ICD-10-PCS | Performed by: INTERNAL MEDICINE

## 2018-02-16 RX ADMIN — FERROUS SULFATE TAB 325 MG (65 MG ELEMENTAL FE) SCH MG: 325 (65 FE) TAB at 08:29

## 2018-02-16 RX ADMIN — TAZOBACTAM SODIUM AND PIPERACILLIN SODIUM SCH MLS/HR: 250; 2 INJECTION, SOLUTION INTRAVENOUS at 04:23

## 2018-02-16 RX ADMIN — Medication SCH ML: at 08:27

## 2018-02-16 RX ADMIN — ATORVASTATIN CALCIUM SCH MG: 20 TABLET, FILM COATED ORAL at 20:19

## 2018-02-16 RX ADMIN — HUMAN INSULIN SCH: 100 INJECTION, SOLUTION SUBCUTANEOUS at 07:46

## 2018-02-16 RX ADMIN — STANDARDIZED SENNA CONCENTRATE AND DOCUSATE SODIUM SCH TAB: 8.6; 5 TABLET, FILM COATED ORAL at 08:29

## 2018-02-16 RX ADMIN — Medication SCH ML: at 20:27

## 2018-02-16 RX ADMIN — OXYCODONE HYDROCHLORIDE AND ACETAMINOPHEN SCH MG: 500 TABLET ORAL at 08:29

## 2018-02-16 RX ADMIN — STANDARDIZED SENNA CONCENTRATE AND DOCUSATE SODIUM SCH TAB: 8.6; 5 TABLET, FILM COATED ORAL at 20:18

## 2018-02-16 RX ADMIN — HUMAN INSULIN SCH: 100 INJECTION, SOLUTION SUBCUTANEOUS at 17:00

## 2018-02-16 RX ADMIN — SODIUM CHLORIDE SCH MLS/HR: 234 INJECTION INTRAMUSCULAR; INTRAVENOUS; SUBCUTANEOUS at 06:09

## 2018-02-16 RX ADMIN — HUMAN INSULIN SCH: 100 INJECTION, SOLUTION SUBCUTANEOUS at 11:27

## 2018-02-16 RX ADMIN — TAZOBACTAM SODIUM AND PIPERACILLIN SODIUM SCH MLS/HR: 250; 2 INJECTION, SOLUTION INTRAVENOUS at 08:29

## 2018-02-16 RX ADMIN — SODIUM CHLORIDE SCH MLS/HR: 234 INJECTION INTRAMUSCULAR; INTRAVENOUS; SUBCUTANEOUS at 16:33

## 2018-02-16 RX ADMIN — HUMAN INSULIN SCH: 100 INJECTION, SOLUTION SUBCUTANEOUS at 20:22

## 2018-02-16 RX ADMIN — PANTOPRAZOLE SCH MG: 40 TABLET, DELAYED RELEASE ORAL at 08:29

## 2018-02-16 RX ADMIN — CHLORHEXIDINE GLUCONATE SCH PACK: 500 CLOTH TOPICAL at 04:00

## 2018-02-16 RX ADMIN — SODIUM CHLORIDE SCH MLS/HR: 900 INJECTION INTRAVENOUS at 00:27

## 2018-02-16 NOTE — [facility]
Addendum to Inpatient Note


Addendum Reason:  Additional Documentation


Additional Information


Chart reviewed


urine culture reviewed


WBC better





Recs:


DC Vanco IV


DC Zosyn IV


Observe off antibiotics.


If doing well clinically and no change in clinical condition ok to DC with no 

antibiotics on discharge.


dw  who agrees


Will sign off please call back if any change in clinical condition or questions.











Sandra Romeo MD Feb 16, 2018 16:06

## 2018-02-16 NOTE — HHI.PR
Subjective


Patient symptoms today


Pt seen and examined. Feeling better. No complaints.





Objective


Vital Signs





Vital Signs








  Date Time  Temp Pulse Resp B/P (MAP) Pulse Ox O2 Delivery O2 Flow Rate FiO2


 


2/16/18 04:00 97.7 63 20 133/76 (95) 96   


 


2/16/18 00:00  70      


 


2/16/18 00:00 97.6 65 18 130/77 (94) 98   


 


2/15/18 20:00 98.5 70 18 142/81 (101) 96   


 


2/15/18 18:00 97.6 60 19 136/66 (89) 100   


 


2/15/18 12:00 98.7 77 19 124/80 (95) 100   














Intake & Output  


 


 2/16/18 2/16/18





 07:00 19:00


 


Intake Total 2540 ml 


 


Output Total 1775 ml 


 


Balance 765 ml 


 


  


 


Intake Oral 720 ml 


 


IV Total 1820 ml 


 


Output Urine Total 500 ml 


 


Drainage Total 1275 ml 


 


# Bowel Movements 3 








Result Diagram:  


2/16/18 0346                                                                   

             2/16/18 0346





Objective Remarks


Abd:soft,nt,nd


Urine: clearing


Right PCNT: derek urine





2/14


Abd:soft,nt,nd


Urine: clearing


Right PCNT: pink urine





2/15


Abd:soft,nt,nd


Urine: clearing


Right PCNT: pink urine





2/16


Abd:soft,nt,nd


Page: urine clear


Right PCNT: urine clearing


Medications and IVs





Current Medications








 Medications


  (Trade)  Dose


 Ordered  Sig/[place]


 Route  Start Time


 Stop Time Status Last Admin


 


  (Lipitor)  20 mg  HS


 PO  2/12/18 21:00


    2/15/18 20:47


 


 


  (Tylenol)  650 mg  Q6H  PRN


 PO  2/12/18 02:45


    2/12/18 18:49


 


 


  (Morphine Inj)  2 mg  Q2H  PRN


 IV PUSH  2/12/18 02:45


    2/14/18 03:02


 


 


  (Zofran Inj)  4 mg  Q6H  PRN


 IV PUSH  2/12/18 02:45


    2/15/18 20:54


 


 


 Miscellaneous


 Information  1  Q361D


 XX  2/12/18 02:45


     


 


 


  (Chlorhexidine


 2% Cloth)  3 pack


 Taper  DAILY@04


 TOP  2/12/18 04:00


 2/8/19 03:59   


 


 


  (Chlorhexidine


 2% Cloth)  3 pack  UNSCH  PRN


 TOP  2/12/18 02:45


     


 


 


  (Kaylee-Colace)  1 tab  BID


 PO  2/12/18 09:00


    2/16/18 08:29


 


 


  (Milk Of


 Magnesia Liq)  30 ml  Q12H  PRN


 PO  2/12/18 02:45


     


 


 


  (Senokot)  17.2 mg  Q12H  PRN


 PO  2/12/18 02:45


     


 


 


  (Dulcolax Supp)  10 mg  DAILY  PRN


 RECTAL  2/12/18 02:45


     


 


 


  (Lactulose Liq)  30 ml  DAILY  PRN


 PO  2/12/18 02:45


     


 


 


  (NS Flush)  2 ml  UNSCH  PRN


 IV FLUSH  2/12/18 02:45


     


 


 


  (NS Flush)  2 ml  BID


 IV FLUSH  2/12/18 09:00


    2/15/18 20:55


 


 


 Pharmacy Profile


 Note  0 ml @ 0


 mls/hr  UNSCH


 OTHER  2/12/18 02:45


     


 


 


 Piperacillin Sod/


 Tazobactam Sod  50 ml @ 


 100 mls/hr  Q6H


 IV  2/12/18 04:00


    2/16/18 08:29


 


 


  (NS Flush)    DAILY


 IV FLUSH  2/12/18 11:30


    2/14/18 09:22


 


 


  (NS Flush)    UNSCH  PRN


 IV FLUSH  2/12/18 11:30


     


 


 


  (Albuterol Neb)  2.5 mg  Q2HR NEB  PRN


 NEB  2/12/18 13:45


     


 


 


  (Cathflo


 Activase Inj)  2 mg  Q2H  PRN


 INTRACATH  2/13/18 12:00


     


 


 


  (Protonix)  40 mg  DAILY


 PO  2/14/18 09:00


    2/16/18 08:29


 


 


  (Heparin Inj)  5,000 units  Q12HR


 SQ  2/13/18 21:00


   Future Hold 2/14/18 09:23


 


 


  (Ferrous Sulfate)  325 mg  DAILY


 PO  2/14/18 09:00


    2/16/18 08:29


 


 


  (Vitamin C)  500 mg  DAILY


 PO  2/14/18 09:00


    2/16/18 08:29


 


 


  (D50w (Vial) Inj)  50 ml  UNSCH  PRN


 IV PUSH  2/13/18 10:00


     


 


 


  (Glucagon Inj)  1 mg  UNSCH  PRN


 OTHER  2/13/18 10:00


     


 


 


  (NovoLIN R


 SUPPLEMENTAL


 SCALE)  1  ACHS SLIDING  SCALE


 SQ  2/13/18 12:00


    2/14/18 21:56


 


 


 Vancomycin HCl


 1500 mg/Sodium


 Chloride  515 ml @ 


 250 mls/hr  Q18H


 IV  2/15/18 06:00


    2/16/18 00:27


 


 


 Miscellaneous


 Information  SPECIFIC LAB TO BE


 DRAWN:VANCOMYCIN


 TROUGH DATE TO...  ONCE  ONCE


 .XX  2/16/18 17:45


 2/16/18 17:46   


 


 


 Dextrose  1,000 ml @ 


 100 mls/hr  Q10H


 IV  2/15/18 11:45


    2/15/18 20:50


 


 


 Lactated Ringer's  1,000 ml @ 


 30 mls/hr  Q24H PRN


 IV  2/16/18 06:45


 2/19/18 06:44   


 











Assessment and Plan


Assessment and Plan


72 y.o female with sepsis due to large right staghorn calculus s/p right PCNT 

with nephroureteral stent by IR yesterday


Continue page and Right PCNT drainage


Continue IV abx


Will need right PCNL as outpt.





2/14


72 y.o female with sepsis due to large right staghorn calculus s/p right PCNT 

with nephroureteral stent


Group D Enterococcus from Ucx. B/C NGTD


Continue IV Abx


Will need right PCNL as out pt. Maintain Right PCNT.





2/15


72 y.o female with sepsis due to large right staghorn calculus s/p right PCNT 

with nephroureteral stent


Rigth PCNL as outpt. Maintain right PCNT


Page can be removed at time of discharge


Continue Abx 





2/16


72 y.o female with sepsis due to large right staghorn calculus s/p right PCNT 

with nephroureteral stent


Rigth PCNL as outpt. Maintain right PCNT


Page can be removed at time of discharge


Continue Abx therapy











Rob Mazariegos DO Feb 16, 2018 08:56

## 2018-02-16 NOTE — HHI.PR
Subjective


Remarks


The patient was seen following colonoscopy.  She wanted to drink some water.  

She describes some abdominal pain on the right side.  No other acute 

complaints.  Discussed with nursing.





Objective


Vitals





Vital Signs








  Date Time  Temp Pulse Resp B/P (MAP) Pulse Ox O2 Delivery O2 Flow Rate FiO2


 


2/16/18 14:47 98.6 73 18 117/65 (82) 96   


 


2/16/18 12:00 98.2 58 19 142/79 (100) 100   


 


2/16/18 08:00 96.9 58 20 143/69 (93) 98   


 


2/16/18 04:00 97.7 63 20 133/76 (95) 96   


 


2/16/18 00:00  70      


 


2/16/18 00:00 97.6 65 18 130/77 (94) 98   


 


2/15/18 20:00 98.5 70 18 142/81 (101) 96   


 


2/15/18 18:00 97.6 60 19 136/66 (89) 100   














I/O      


 


 2/15/18 2/15/18 2/15/18 2/16/18 2/16/18 2/16/18





 07:00 15:00 23:00 07:00 15:00 23:00


 


Intake Total 1000 ml 120 ml 2215 ml 1285 ml 220 ml 


 


Output Total 1250 ml  2525 ml 1775 ml 620 ml 


 


Balance -250 ml 120 ml -310 ml -490 ml -400 ml 


 


      


 


Intake Oral  120 ml 960 ml 720 ml 120 ml 


 


IV Total 1000 ml  1255 ml 565 ml 50 ml 


 


Other     50 ml 


 


Output Urine Total 350 ml  1850 ml 500 ml 220 ml 


 


Drainage Total 900 ml  675 ml 1275 ml 400 ml 


 


# Bowel Movements   3 3  








Result Diagram:  


2/16/18 0346                                                                   

             2/16/18 0346





Imaging





Last Impressions








Chest X-Ray 2/12/18 1124 Signed





Impressions: 





 Service Date/Time:  Monday, February 12, 2018 11:39 - CONCLUSION:  1. Left IJ 





 central line tip in the very proximal SVC. No pneumothorax. 2. Minimal 

bibasilar 





 airspace disease, likely atelectasis.     Mario Del Rosario MD 


 


Head CT 2/12/18 0046 Signed





Impressions: 





 Service Date/Time:  Monday, February 12, 2018 01:17 - CONCLUSION:  No 





 significant change has occurred.       Elie Francois MD 


 


Abdomen/Pelvis CT 2/12/18 0046 Signed





Impressions: 





 Service Date/Time:  Monday, February 12, 2018 01:19 - CONCLUSION:  Stable 





 staghorn calculus on the right and nonobstructing left renal calculi as well 

as 





 bilateral renal masses. Calcified uterine fibroids. Diverticulosis without 





 diverticulitis. There is some air in the endometrial cavity which may be 

related 





 to recent instrumentation.     Elie Francois MD 








Objective Remarks


GENERAL: Resting in bed in no acute distress.


SKIN: Focused skin assessment warm/dry.  No rash.


HEAD: Atraumatic. Normocephalic. 


EYES: No scleral icterus. No injection or drainage. 


ENT: No nasal bleeding or discharge.  Mucous membranes pink and moist.


NECK: Trachea midline. No JVD. 


CARDIOVASCULAR: Regular rate and rhythm S1, S2.  No S4.  No murmur. 


RESPIRATORY: No accessory muscle use. Clear to auscultation. Breath sounds 

equal bilaterally. 


GASTROINTESTINAL: Abdomen soft, slightly tender in the right lower quadrant, 

nondistended.  Hypoactive bowel sounds are appreciated.


: Right nephrostomy tube in place. Ortega in place. 


MUSCULOSKELETAL: No obvious deformities.  No significant peripheral edema. 


NEUROLOGICAL: Awake, minimally nonverbal. No obvious cranial nerve deficits.  

Motor grossly within normal limits.


Medications and IVs





Current Medications








 Medications


  (Trade)  Dose


 Ordered  Sig/[place]


 Route  Start Time


 Stop Time Status Last Admin


 


  (Lipitor)  20 mg  HS


 PO  2/12/18 21:00


    2/15/18 20:47


 


 


  (Tylenol)  650 mg  Q6H  PRN


 PO  2/12/18 02:45


    2/12/18 18:49


 


 


  (Morphine Inj)  2 mg  Q2H  PRN


 IV PUSH  2/12/18 02:45


    2/14/18 03:02


 


 


  (Zofran Inj)  4 mg  Q6H  PRN


 IV PUSH  2/12/18 02:45


    2/15/18 20:54


 


 


 Miscellaneous


 Information  1  Q361D


 XX  2/12/18 02:45


     


 


 


  (Chlorhexidine


 2% Cloth)  3 pack


 Taper  DAILY@04


 TOP  2/12/18 04:00


 2/8/19 03:59   


 


 


  (Chlorhexidine


 2% Cloth)  3 pack  UNSCH  PRN


 TOP  2/12/18 02:45


     


 


 


  (Kaylee-Colace)  1 tab  BID


 PO  2/12/18 09:00


    2/16/18 08:29


 


 


  (Milk Of


 Magnesia Liq)  30 ml  Q12H  PRN


 PO  2/12/18 02:45


     


 


 


  (Senokot)  17.2 mg  Q12H  PRN


 PO  2/12/18 02:45


     


 


 


  (Dulcolax Supp)  10 mg  DAILY  PRN


 RECTAL  2/12/18 02:45


     


 


 


  (Lactulose Liq)  30 ml  DAILY  PRN


 PO  2/12/18 02:45


     


 


 


  (NS Flush)  2 ml  UNSCH  PRN


 IV FLUSH  2/12/18 02:45


     


 


 


  (NS Flush)  2 ml  BID


 IV FLUSH  2/12/18 09:00


    2/15/18 20:55


 


 


  (NS Flush)    DAILY


 IV FLUSH  2/12/18 11:30


    2/14/18 09:22


 


 


  (NS Flush)    UNSCH  PRN


 IV FLUSH  2/12/18 11:30


     


 


 


  (Albuterol Neb)  2.5 mg  Q2HR NEB  PRN


 NEB  2/12/18 13:45


     


 


 


  (Cathflo


 Activase Inj)  2 mg  Q2H  PRN


 INTRACATH  2/13/18 12:00


     


 


 


  (Protonix)  40 mg  DAILY


 PO  2/14/18 09:00


    2/16/18 08:29


 


 


  (Heparin Inj)  5,000 units  Q12HR


 SQ  2/13/18 21:00


   Future Hold 2/14/18 09:23


 


 


  (Ferrous Sulfate)  325 mg  DAILY


 PO  2/14/18 09:00


    2/16/18 08:29


 


 


  (Vitamin C)  500 mg  DAILY


 PO  2/14/18 09:00


    2/16/18 08:29


 


 


  (D50w (Vial) Inj)  50 ml  UNSCH  PRN


 IV PUSH  2/13/18 10:00


     


 


 


  (Glucagon Inj)  1 mg  UNSCH  PRN


 OTHER  2/13/18 10:00


     


 


 


  (NovoLIN R


 SUPPLEMENTAL


 SCALE)  1  ACHS SLIDING  SCALE


 SQ  2/13/18 12:00


    2/14/18 21:56


 


 


 Dextrose  1,000 ml @ 


 125 mls/hr  Q8H


 IV  2/15/18 11:45


    2/15/18 20:50


 


 


 Lactated Ringer's  1,000 ml @ 


 30 mls/hr  Q24H PRN


 IV  2/16/18 06:45


 2/19/18 06:44   


 








Date of Insertion:  Feb 12, 2018


Line:  Central Venous Catheter


Side:  Left


Location:  Internal, Jugular





A/P


Assessment and Plan


Sepsis


History of pansensitive Proteus mirabilis urinary tract infection. ID consult 

appreciated.  Urine culture noted.


- Discontinue piperacillin/tazobactam and monitor per ID.


- PT/ OT evaluate and treat.





Right staghorn calculus/ Nonobstructing left nephrolithiasis/ Renal masses


Status post percutaneous nephroureteral stent right-sided for right staghorn 

calculus/likely source of sepsis.


- Will need lithotripsy at some point in the future per urology.


- Maintain Ortega catheter.


- pain control with a bowel regimen.





Acute encephalopathy 


Secondary to severe sepsis/urinary tract source. CT brain 2/12 revealed no 

acute intracranial findings. Improving.


- treat underlying condition.


- avoid sedating meds.





HTN


Blood pressure well controlled at this time.


- Holding lisinopril 20 daily light of hypotension/ acute kidney injury.  

Resume when clinically indicated.


- Resume amlodipine 5 mg daily.





Hyperglycemia of critical illness


Recent A1c 5.1%.  Glucose well-controlled at this time.


- insulin sliding scale and Accu-Cheks.





Acute kidney injury


S/t above.  Resolved.


- treatment as above.


- Avoid nephrotoxic drugs.


- IVFs.


- Monitor BMP daily.  


- Pending urine eosinophils.  





Bilateral uterine fibroids/ Endometrial cancer


Noted on imaging.


- Outpatient follow-up with OBGYN.





Microcytic anemia/ GIB


Status post 4 units PRBCs. BRBPR noted by nursing.  GI consult appreciated.


- Monitor CBC daily.  


- Results of colonoscopy pending.





Hypernatremia/ hypokalemia/ hypophosphatemia


S/t decreased PO intake.


- follow BMP. Improved. 


- IV K-phos.


- Continue D5W.





PPx: Heparin











Kamar Kahn DO Feb 16, 2018 16:30

## 2018-02-16 NOTE — PD.PROCEDR
GI Procedure





PROCEDURE PERFORMED


Colonoscopy





INDICATION FOR PROCEDURE


Rectal bleeding





PROCEDURE:


The procedure, risks and benefits were discussed with Ms. Park and informed


consent was obtained.  Anesthesia sedated her with Diprivan.  She was placed in 

the left lateral decubitus position.





Colonoscopy:


The Pentax videoscope was introduced through the rectum and advanced to cecum 

where the ileocecal valve and appendiceal orifice were identified. Retroflexion 

was performed in the rectum. Colonic prep was good





FINDINGS:


Colonic withdrawal time greater than 6 minutes as the scope was slowly 

withdrawn colonic mucosa was carefully inspected this was noted to be 

unremarkable and within normal limits all the way through the patient was noted 

to have diverticulosis scattered throughout but mostly in the sigmoid rectal 

examination was unremarkable but retroflexion in the rectum did reveal mild to 

moderate internal hemorrhoids





ESTIMATED BLOOD LOSS:


None





SPECIMENS REMOVED:


None





COMPLICATIONS:


None





IMPRESSION:


Diverticulosis


Internal hemorrhoids





PLAN:


High-fiber diet


Continue with current supportive care


Most likely cause of her bleeding was diverticular bleed


Monitor labs











Sebas Marks MD Feb 16, 2018 17:36

## 2018-02-17 VITALS — HEART RATE: 82 BPM

## 2018-02-17 VITALS
DIASTOLIC BLOOD PRESSURE: 70 MMHG | OXYGEN SATURATION: 99 % | TEMPERATURE: 96.5 F | HEART RATE: 68 BPM | RESPIRATION RATE: 20 BRPM | SYSTOLIC BLOOD PRESSURE: 127 MMHG

## 2018-02-17 VITALS
SYSTOLIC BLOOD PRESSURE: 133 MMHG | HEART RATE: 80 BPM | DIASTOLIC BLOOD PRESSURE: 66 MMHG | RESPIRATION RATE: 19 BRPM | OXYGEN SATURATION: 100 % | TEMPERATURE: 97.7 F

## 2018-02-17 VITALS
SYSTOLIC BLOOD PRESSURE: 120 MMHG | TEMPERATURE: 95.2 F | OXYGEN SATURATION: 99 % | RESPIRATION RATE: 17 BRPM | HEART RATE: 83 BPM | DIASTOLIC BLOOD PRESSURE: 67 MMHG

## 2018-02-17 VITALS
RESPIRATION RATE: 17 BRPM | DIASTOLIC BLOOD PRESSURE: 78 MMHG | OXYGEN SATURATION: 98 % | HEART RATE: 75 BPM | SYSTOLIC BLOOD PRESSURE: 137 MMHG | TEMPERATURE: 98.6 F

## 2018-02-17 VITALS
TEMPERATURE: 96.3 F | RESPIRATION RATE: 18 BRPM | HEART RATE: 78 BPM | OXYGEN SATURATION: 97 % | SYSTOLIC BLOOD PRESSURE: 123 MMHG | DIASTOLIC BLOOD PRESSURE: 78 MMHG

## 2018-02-17 VITALS
RESPIRATION RATE: 20 BRPM | OXYGEN SATURATION: 98 % | HEART RATE: 69 BPM | DIASTOLIC BLOOD PRESSURE: 71 MMHG | TEMPERATURE: 96.9 F | SYSTOLIC BLOOD PRESSURE: 140 MMHG

## 2018-02-17 VITALS — HEART RATE: 72 BPM

## 2018-02-17 LAB
BUN SERPL-MCNC: 7 MG/DL (ref 7–18)
CALCIUM SERPL-MCNC: 10 MG/DL (ref 8.5–10.1)
CHLORIDE SERPL-SCNC: 108 MEQ/L (ref 98–107)
CREAT SERPL-MCNC: 0.74 MG/DL (ref 0.5–1)
ERYTHROCYTE [DISTWIDTH] IN BLOOD BY AUTOMATED COUNT: 20.2 % (ref 11.6–17.2)
GFR SERPLBLD BASED ON 1.73 SQ M-ARVRAT: 93 ML/MIN (ref 89–?)
GLUCOSE SERPL-MCNC: 144 MG/DL (ref 74–106)
HCO3 BLD-SCNC: 26.2 MEQ/L (ref 21–32)
HCT VFR BLD CALC: 31.3 % (ref 35–46)
HGB BLD-MCNC: 10.2 GM/DL (ref 11.6–15.3)
MAGNESIUM SERPL-MCNC: 2 MG/DL (ref 1.5–2.5)
MCH RBC QN AUTO: 27.4 PG (ref 27–34)
MCHC RBC AUTO-ENTMCNC: 32.5 % (ref 32–36)
MCV RBC AUTO: 84.4 FL (ref 80–100)
PLATELET # BLD: 64 TH/MM3 (ref 150–450)
PMV BLD AUTO: 9.4 FL (ref 7–11)
RBC # BLD AUTO: 3.71 MIL/MM3 (ref 4–5.3)
SODIUM SERPL-SCNC: 140 MEQ/L (ref 136–145)
WBC # BLD AUTO: 8.9 TH/MM3 (ref 4–11)

## 2018-02-17 RX ADMIN — HUMAN INSULIN SCH: 100 INJECTION, SOLUTION SUBCUTANEOUS at 12:00

## 2018-02-17 RX ADMIN — FERROUS SULFATE TAB 325 MG (65 MG ELEMENTAL FE) SCH MG: 325 (65 FE) TAB at 08:28

## 2018-02-17 RX ADMIN — OXYCODONE HYDROCHLORIDE AND ACETAMINOPHEN SCH MG: 500 TABLET ORAL at 08:28

## 2018-02-17 RX ADMIN — HUMAN INSULIN SCH: 100 INJECTION, SOLUTION SUBCUTANEOUS at 07:35

## 2018-02-17 RX ADMIN — HUMAN INSULIN SCH: 100 INJECTION, SOLUTION SUBCUTANEOUS at 20:11

## 2018-02-17 RX ADMIN — SODIUM CHLORIDE SCH MLS/HR: 234 INJECTION INTRAMUSCULAR; INTRAVENOUS; SUBCUTANEOUS at 08:13

## 2018-02-17 RX ADMIN — STANDARDIZED SENNA CONCENTRATE AND DOCUSATE SODIUM SCH TAB: 8.6; 5 TABLET, FILM COATED ORAL at 08:27

## 2018-02-17 RX ADMIN — Medication SCH ML: at 08:27

## 2018-02-17 RX ADMIN — SODIUM CHLORIDE SCH MLS/HR: 234 INJECTION INTRAMUSCULAR; INTRAVENOUS; SUBCUTANEOUS at 03:19

## 2018-02-17 RX ADMIN — PANTOPRAZOLE SCH MG: 40 TABLET, DELAYED RELEASE ORAL at 08:28

## 2018-02-17 RX ADMIN — Medication SCH ML: at 20:11

## 2018-02-17 RX ADMIN — ATORVASTATIN CALCIUM SCH MG: 20 TABLET, FILM COATED ORAL at 20:11

## 2018-02-17 RX ADMIN — Medication SCH ML: at 07:36

## 2018-02-17 RX ADMIN — STANDARDIZED SENNA CONCENTRATE AND DOCUSATE SODIUM SCH TAB: 8.6; 5 TABLET, FILM COATED ORAL at 20:10

## 2018-02-17 RX ADMIN — CHLORHEXIDINE GLUCONATE SCH PACK: 500 CLOTH TOPICAL at 03:34

## 2018-02-17 RX ADMIN — MORPHINE SULFATE PRN MG: 2 INJECTION, SOLUTION INTRAMUSCULAR; INTRAVENOUS at 12:22

## 2018-02-17 RX ADMIN — HUMAN INSULIN SCH: 100 INJECTION, SOLUTION SUBCUTANEOUS at 16:57

## 2018-02-17 NOTE — HHI.PR
Subjective


Remarks


The patient was complaining of right hip pain.  She said it has been going on 

since her surgery.  She had no other acute complaints.  Discussed with nursing.





Objective


Vitals





Vital Signs








  Date Time  Temp Pulse Resp B/P (MAP) Pulse Ox O2 Delivery O2 Flow Rate FiO2


 


2/17/18 12:00 95.2 83 17 120/67 (84) 99   


 


2/17/18 08:00 97.7 80 19 133/66 (88) 100   


 


2/17/18 04:00 96.9 69 20 140/71 (94) 98   


 


2/17/18 00:00 96.5 68 20 127/70 (89) 99   


 


2/16/18 23:55  66      


 


2/16/18 20:00 96.5 64 20 166/80 (108) 99   


 


2/16/18 19:54  49      


 


2/16/18 16:00 97.8 59 19 143/73 (96) 95   


 


2/16/18 14:47 98.6 73 18 117/65 (82) 96   














I/O      


 


 2/16/18 2/16/18 2/16/18 2/17/18 2/17/18 2/17/18





 07:00 15:00 23:00 07:00 15:00 23:00


 


Intake Total 1285 ml 220 ml 480 ml 1240 ml  


 


Output Total 1775 ml 620 ml 600 ml 1550 ml  


 


Balance -490 ml -400 ml -120 ml -310 ml  


 


      


 


Intake Oral 720 ml 120 ml 480 ml 240 ml  


 


IV Total 565 ml 50 ml  1000 ml  


 


Other  50 ml    


 


Output Urine Total 500 ml 220 ml 600 ml 750 ml  


 


Drainage Total 1275 ml 400 ml  800 ml  


 


# Bowel Movements 3  1   








Result Diagram:  


2/17/18 0651                                                                   

             2/17/18 0651





Imaging





Last Impressions








Chest X-Ray 2/12/18 1124 Signed





Impressions: 





 Service Date/Time:  Monday, February 12, 2018 11:39 - CONCLUSION:  1. Left IJ 





 central line tip in the very proximal SVC. No pneumothorax. 2. Minimal 

bibasilar 





 airspace disease, likely atelectasis.     Mario Del Rosario MD 


 


Head CT 2/12/18 0046 Signed





Impressions: 





 Service Date/Time:  Monday, February 12, 2018 01:17 - CONCLUSION:  No 





 significant change has occurred.       Elie Francois MD 


 


Abdomen/Pelvis CT 2/12/18 0046 Signed





Impressions: 





 Service Date/Time:  Monday, February 12, 2018 01:19 - CONCLUSION:  Stable 





 staghorn calculus on the right and nonobstructing left renal calculi as well 

as 





 bilateral renal masses. Calcified uterine fibroids. Diverticulosis without 





 diverticulitis. There is some air in the endometrial cavity which may be 

related 





 to recent instrumentation.     Elie Francois MD 








Objective Remarks


GENERAL: Appears uncomfortable.


SKIN: Focused skin assessment warm/dry.  No rash.


HEAD: Atraumatic. Normocephalic. 


EYES: No scleral icterus. No injection or drainage. 


ENT: No nasal bleeding or discharge.  Mucous membranes pink and moist.


NECK: Trachea midline. No JVD. 


CARDIOVASCULAR: Regular rate and rhythm S1, S2.  No S4.  No murmur. 


RESPIRATORY: No accessory muscle use. Clear to auscultation. Breath sounds 

equal bilaterally. 


GASTROINTESTINAL: Abdomen soft, slightly tender in the right lower quadrant, 

nondistended.  Hypoactive bowel sounds are appreciated.


: Right nephrostomy tube in place. Ortega in place. 


MUSCULOSKELETAL: No obvious deformities.  No significant peripheral edema.  No 

tenderness to palpation of right hip, knee or ankle.


NEUROLOGICAL: Awake, minimally nonverbal. No obvious cranial nerve deficits.  

Motor grossly within normal limits.


Medications and IVs





Current Medications








 Medications


  (Trade)  Dose


 Ordered  Sig/[place]


 Route  Start Time


 Stop Time Status Last Admin


 


  (Lipitor)  20 mg  HS


 PO  2/12/18 21:00


    2/16/18 20:19


 


 


  (Tylenol)  650 mg  Q6H  PRN


 PO  2/12/18 02:45


    2/12/18 18:49


 


 


  (Morphine Inj)  2 mg  Q2H  PRN


 IV PUSH  2/12/18 02:45


    2/17/18 12:22


 


 


  (Zofran Inj)  4 mg  Q6H  PRN


 IV PUSH  2/12/18 02:45


    2/15/18 20:54


 


 


 Miscellaneous


 Information  1  Q361D


 XX  2/12/18 02:45


     


 


 


  (Chlorhexidine


 2% Cloth)  


 Taper  DAILY@04


 TOP  2/12/18 04:00


 2/8/19 03:59   


 


 


  (Chlorhexidine


 2% Cloth)  3 pack  UNSCH  PRN


 TOP  2/12/18 02:45


     


 


 


  (Kaylee-Colace)  1 tab  BID


 PO  2/12/18 09:00


    2/17/18 08:27


 


 


  (Milk Of


 Magnesia Liq)  30 ml  Q12H  PRN


 PO  2/12/18 02:45


     


 


 


  (Senokot)  17.2 mg  Q12H  PRN


 PO  2/12/18 02:45


     


 


 


  (Dulcolax Supp)  10 mg  DAILY  PRN


 RECTAL  2/12/18 02:45


     


 


 


  (Lactulose Liq)  30 ml  DAILY  PRN


 PO  2/12/18 02:45


     


 


 


  (NS Flush)  2 ml  UNSCH  PRN


 IV FLUSH  2/12/18 02:45


     


 


 


  (NS Flush)  2 ml  BID


 IV FLUSH  2/12/18 09:00


    2/15/18 20:55


 


 


  (NS Flush)    DAILY


 IV FLUSH  2/12/18 11:30


    2/14/18 09:22


 


 


  (NS Flush)    UNSCH  PRN


 IV FLUSH  2/12/18 11:30


     


 


 


  (Albuterol Neb)  2.5 mg  Q2HR NEB  PRN


 NEB  2/12/18 13:45


     


 


 


  (Cathflo


 Activase Inj)  2 mg  Q2H  PRN


 INTRACATH  2/13/18 12:00


     


 


 


  (Protonix)  40 mg  DAILY


 PO  2/14/18 09:00


    2/17/18 08:28


 


 


  (Ferrous Sulfate)  325 mg  DAILY


 PO  2/14/18 09:00


    2/17/18 08:28


 


 


  (Vitamin C)  500 mg  DAILY


 PO  2/14/18 09:00


    2/17/18 08:28


 


 


  (D50w (Vial) Inj)  50 ml  UNSCH  PRN


 IV PUSH  2/13/18 10:00


     


 


 


  (Glucagon Inj)  1 mg  UNSCH  PRN


 OTHER  2/13/18 10:00


     


 


 


  (NovoLIN R


 SUPPLEMENTAL


 SCALE)  1  ACHS SLIDING  SCALE


 SQ  2/13/18 12:00


    2/16/18 20:22


 


 


 Dextrose  1,000 ml @ 


 50 mls/hr  Q20H


 IV  2/15/18 11:45


    2/17/18 03:19


 


 


 Lactated Ringer's  1,000 ml @ 


 30 mls/hr  Q24H PRN


 IV  2/16/18 06:45


 2/19/18 06:44   


 








Date of Insertion:  Feb 12, 2018


Line:  Central Venous Catheter


Side:  Left


Location:  Internal, Jugular





A/P


Assessment and Plan


Sepsis


History of pansensitive Proteus mirabilis urinary tract infection. ID consult 

appreciated.  Urine culture noted.


- Discontinue piperacillin/tazobactam and monitor per ID.


- PT/ OT evaluate and treat.





Right staghorn calculus/ Nonobstructing left nephrolithiasis/ Renal masses


Status post percutaneous nephroureteral stent right-sided for right staghorn 

calculus/likely source of sepsis.


- Will need lithotripsy at some point in the future per urology.


- Maintain Ortega catheter.


- pain control with a bowel regimen.





Acute encephalopathy 


Secondary to severe sepsis/urinary tract source. CT brain 2/12 revealed no 

acute intracranial findings. Improving.


- treat underlying condition.


- avoid sedating meds.





HTN


Blood pressure well controlled at this time.


- Holding lisinopril 20 daily light of hypotension/ acute kidney injury.  

Resume when clinically indicated.


- Resumed amlodipine 5 mg daily.





Hyperglycemia of critical illness


Recent A1c 5.1%.  Glucose well-controlled at this time.


- insulin sliding scale and Accu-Cheks.





Acute kidney injury


S/t above.  Resolved.


- treatment as above.


- Avoid nephrotoxic drugs.


- IVFs.


- Monitor BMP daily.  


- Pending urine eosinophils.  





Bilateral uterine fibroids/ Endometrial cancer


Noted on imaging.


- Outpatient follow-up with OBGYN.





Microcytic anemia/ GIB


Status post 4 units PRBCs. BRBPR noted by nursing.  GI consult appreciated.  

Colonoscopy with diverticulosis and internal hemorrhoids.


- Monitor CBC and transfuse as needed.





Thrombocytopenia


Platelet count has been steadily decreasing.  She was on heparin products.  

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


- Stop heparin.


- Check HIT panel. 


- Check coags, LDH, haptoglobin and d-dimer.


- Follow CBC and transfuse as needed.


- Hematology consult if no improvement.





Hypernatremia/ hypokalemia/ hypophosphatemia


S/t decreased PO intake.


- follow BMP. Improved. 


- Wean off D5W.





PPx: Heparin











Kamar Kahn DO Feb 17, 2018 12:36

## 2018-02-17 NOTE — HHI.GIFU
Subjective


Remarks


Pt resting in bed in NAD.  No further bleeding, no abd pain.  


 (Annamaria Lindsey)





Objective


Vitals I&O





Vital Signs








  Date Time  Temp Pulse Resp B/P (MAP) Pulse Ox O2 Delivery O2 Flow Rate FiO2


 


2/17/18 12:00 95.2 83 17 120/67 (84) 99   


 


2/17/18 08:00 97.7 80 19 133/66 (88) 100   


 


2/17/18 04:00 96.9 69 20 140/71 (94) 98   


 


2/17/18 00:00 96.5 68 20 127/70 (89) 99   


 


2/16/18 23:55  66      


 


2/16/18 20:00 96.5 64 20 166/80 (108) 99   


 


2/16/18 19:54  49      


 


2/16/18 16:00 97.8 59 19 143/73 (96) 95   


 


2/16/18 14:47 98.6 73 18 117/65 (82) 96   














I/O      


 


 2/16/18 2/16/18 2/16/18 2/17/18 2/17/18 2/17/18





 07:00 15:00 23:00 07:00 15:00 23:00


 


Intake Total 1285 ml 220 ml 480 ml 1240 ml  


 


Output Total 1775 ml 620 ml 600 ml 1550 ml  


 


Balance -490 ml -400 ml -120 ml -310 ml  


 


      


 


Intake Oral 720 ml 120 ml 480 ml 240 ml  


 


IV Total 565 ml 50 ml  1000 ml  


 


Other  50 ml    


 


Output Urine Total 500 ml 220 ml 600 ml 750 ml  


 


Drainage Total 1275 ml 400 ml  800 ml  


 


# Bowel Movements 3  1   








Laboratory





Laboratory Tests








Test


  2/17/18


06:51 2/17/18


11:45 2/17/18


12:52


 


White Blood Count 8.9   


 


Red Blood Count 3.71   


 


Hemoglobin 10.2   


 


Hematocrit 31.3   


 


Mean Corpuscular Volume 84.4   


 


Mean Corpuscular Hemoglobin 27.4   


 


Mean Corpuscular Hemoglobin


Concent 32.5 


  


  


 


 


Red Cell Distribution Width 20.2   


 


Platelet Count 64   


 


Mean Platelet Volume 9.4   


 


Blood Urea Nitrogen 7   


 


Creatinine 0.74   


 


Random Glucose 144   


 


Calcium Level 10.0   


 


Magnesium Level 2.0   


 


Sodium Level 140   


 


Potassium Level 3.8   


 


Chloride Level 108   


 


Carbon Dioxide Level 26.2   


 


Anion Gap 6   


 


Estimat Glomerular Filtration


Rate 93 


  


  


 


 


Blood Smear Pathologist Review    














 Date/Time


Source Procedure


Growth Status


 


 


 2/12/18 10:24


Blood Peripheral Aerobic Blood Culture - Final


NO GROWTH IN 5 DAYS Complete


 


 2/12/18 10:24


Blood Peripheral Anaerobic Blood Culture - Final


NO GROWTH IN 5 DAYS Complete


 


 2/12/18 00:51


Urine Catheterized Urine Urine Culture - Final Complete








Imaging





Last Impressions








Chest X-Ray 2/12/18 1124 Signed





Impressions: 





 Service Date/Time:  Monday, February 12, 2018 11:39 - CONCLUSION:  1. Left IJ 





 central line tip in the very proximal SVC. No pneumothorax. 2. Minimal 

bibasilar 





 airspace disease, likely atelectasis.     Mario Del Rosario MD 


 


Head CT 2/12/18 0046 Signed





Impressions: 





 Service Date/Time:  Monday, February 12, 2018 01:17 - CONCLUSION:  No 





 significant change has occurred.       Elie Francois MD 


 


Abdomen/Pelvis CT 2/12/18 0046 Signed





Impressions: 





 Service Date/Time:  Monday, February 12, 2018 01:19 - CONCLUSION:  Stable 





 staghorn calculus on the right and nonobstructing left renal calculi as well 

as 





 bilateral renal masses. Calcified uterine fibroids. Diverticulosis without 





 diverticulitis. There is some air in the endometrial cavity which may be 

related 





 to recent instrumentation.     Elie Francois MD 








Physical Exam


HEENT: PERRL; normocephalic; atraumatic; no jaundice.  


CHEST:  CTA


CARDIAC:  RRR


ABDOMEN:  Soft, mildly distended, nontender; no hepatosplenomegaly; bowel 

sounds are present in all four quadrants.


EXTREMITIES: No clubbing, cyanosis, or edema.


SKIN:  Normal; no rash; no jaundice.


CNS:  awake, lethargic


 (Annamaria Lindsey)





Assessment and Plan


Plan


ASSESSMENT


- BRBPR - LGIB.  no recent colonoscopy.  limited hx. hgb 6.0 on admission, now s

/p 4 x PRBC and trending down


    d/w pts daughter who says she assists with healthcare decisions and is 

agreeable to proceed with colonoscopy


- anemia - microcytic. likely multifactorial and in part 2/2 above.  


- endometrial ca


2/17/18 no further bleeding, tolerating diet.  HH is stable.  s/p colonoscopy 

found diverticulosis, hemorrhoids.  likelybleeding was diverticular





PLAN


- KM


- notify GI of active bleeding


- monitor HH


- transfuse as needed


- GI will sign off, please reconsult if needed


pt seen by myself and Dr York and this note is written on her behalf


 (Annamaria Lindsey)


Physician Comments


seen, examined


agree with above


gi will sign off


call us as needed


if dc fu office 4 weeks


consider hematology eval op-for elevated ldh/ferritin if ok with primary 


 (Lola York MD)











Annamaria Lindsey Feb 17, 2018 13:06


Lola York MD Feb 17, 2018 16:54

## 2018-02-18 VITALS — HEART RATE: 93 BPM

## 2018-02-18 VITALS
RESPIRATION RATE: 17 BRPM | SYSTOLIC BLOOD PRESSURE: 137 MMHG | TEMPERATURE: 97.9 F | OXYGEN SATURATION: 99 % | DIASTOLIC BLOOD PRESSURE: 77 MMHG | HEART RATE: 70 BPM

## 2018-02-18 VITALS
SYSTOLIC BLOOD PRESSURE: 126 MMHG | HEART RATE: 75 BPM | RESPIRATION RATE: 17 BRPM | OXYGEN SATURATION: 98 % | TEMPERATURE: 98.1 F | DIASTOLIC BLOOD PRESSURE: 79 MMHG

## 2018-02-18 VITALS
OXYGEN SATURATION: 97 % | HEART RATE: 73 BPM | SYSTOLIC BLOOD PRESSURE: 129 MMHG | DIASTOLIC BLOOD PRESSURE: 79 MMHG | RESPIRATION RATE: 18 BRPM | TEMPERATURE: 98 F

## 2018-02-18 VITALS
HEART RATE: 88 BPM | OXYGEN SATURATION: 95 % | RESPIRATION RATE: 18 BRPM | TEMPERATURE: 98.9 F | DIASTOLIC BLOOD PRESSURE: 66 MMHG | SYSTOLIC BLOOD PRESSURE: 108 MMHG

## 2018-02-18 VITALS
TEMPERATURE: 96.3 F | HEART RATE: 74 BPM | SYSTOLIC BLOOD PRESSURE: 144 MMHG | RESPIRATION RATE: 18 BRPM | OXYGEN SATURATION: 97 % | DIASTOLIC BLOOD PRESSURE: 66 MMHG

## 2018-02-18 VITALS
HEART RATE: 77 BPM | RESPIRATION RATE: 17 BRPM | OXYGEN SATURATION: 96 % | DIASTOLIC BLOOD PRESSURE: 80 MMHG | TEMPERATURE: 98.8 F | SYSTOLIC BLOOD PRESSURE: 133 MMHG

## 2018-02-18 LAB
BUN SERPL-MCNC: 7 MG/DL (ref 7–18)
CALCIUM SERPL-MCNC: 10.3 MG/DL (ref 8.5–10.1)
CHLORIDE SERPL-SCNC: 107 MEQ/L (ref 98–107)
CREAT SERPL-MCNC: 0.67 MG/DL (ref 0.5–1)
D-DIMER: 5.95 MG/L FEU (ref 0–0.5)
ERYTHROCYTE [DISTWIDTH] IN BLOOD BY AUTOMATED COUNT: 19.9 % (ref 11.6–17.2)
FIBRINOGEN PPP-MCNC: 548 MG/DL (ref 227–377)
GFR SERPLBLD BASED ON 1.73 SQ M-ARVRAT: 104 ML/MIN (ref 89–?)
GLUCOSE SERPL-MCNC: 90 MG/DL (ref 74–106)
HCO3 BLD-SCNC: 26 MEQ/L (ref 21–32)
HCT VFR BLD CALC: 32.8 % (ref 35–46)
HGB BLD-MCNC: 10.7 GM/DL (ref 11.6–15.3)
INR PPP: 1.1 RATIO
INR PPP: 1.1 RATIO
MAGNESIUM SERPL-MCNC: 2.2 MG/DL (ref 1.5–2.5)
MCH RBC QN AUTO: 27.3 PG (ref 27–34)
MCHC RBC AUTO-ENTMCNC: 32.6 % (ref 32–36)
MCV RBC AUTO: 83.7 FL (ref 80–100)
PF4 HEPARIN CMPLX AB SER QL: NEGATIVE
PLATELET # BLD: 47 TH/MM3 (ref 150–450)
PMV BLD AUTO: 10.4 FL (ref 7–11)
PROTHROMBIN TIME: 11.2 SEC (ref 9.8–11.6)
PROTHROMBIN TIME: 11.2 SEC (ref 9.8–11.6)
RBC # BLD AUTO: 3.91 MIL/MM3 (ref 4–5.3)
SODIUM SERPL-SCNC: 140 MEQ/L (ref 136–145)
WBC # BLD AUTO: 7.2 TH/MM3 (ref 4–11)

## 2018-02-18 RX ADMIN — STANDARDIZED SENNA CONCENTRATE AND DOCUSATE SODIUM SCH TAB: 8.6; 5 TABLET, FILM COATED ORAL at 20:34

## 2018-02-18 RX ADMIN — OXYCODONE HYDROCHLORIDE AND ACETAMINOPHEN SCH MG: 500 TABLET ORAL at 08:11

## 2018-02-18 RX ADMIN — PANTOPRAZOLE SCH MG: 40 TABLET, DELAYED RELEASE ORAL at 08:11

## 2018-02-18 RX ADMIN — Medication SCH ML: at 08:12

## 2018-02-18 RX ADMIN — STANDARDIZED SENNA CONCENTRATE AND DOCUSATE SODIUM SCH TAB: 8.6; 5 TABLET, FILM COATED ORAL at 08:11

## 2018-02-18 RX ADMIN — FERROUS SULFATE TAB 325 MG (65 MG ELEMENTAL FE) SCH MG: 325 (65 FE) TAB at 08:10

## 2018-02-18 RX ADMIN — CHLORHEXIDINE GLUCONATE SCH PACK: 500 CLOTH TOPICAL at 02:32

## 2018-02-18 RX ADMIN — HUMAN INSULIN SCH: 100 INJECTION, SOLUTION SUBCUTANEOUS at 12:00

## 2018-02-18 RX ADMIN — HUMAN INSULIN SCH: 100 INJECTION, SOLUTION SUBCUTANEOUS at 08:00

## 2018-02-18 RX ADMIN — HUMAN INSULIN SCH: 100 INJECTION, SOLUTION SUBCUTANEOUS at 15:23

## 2018-02-18 RX ADMIN — SODIUM CHLORIDE SCH MLS/HR: 234 INJECTION INTRAMUSCULAR; INTRAVENOUS; SUBCUTANEOUS at 21:10

## 2018-02-18 RX ADMIN — HUMAN INSULIN SCH: 100 INJECTION, SOLUTION SUBCUTANEOUS at 20:34

## 2018-02-18 RX ADMIN — ATORVASTATIN CALCIUM SCH MG: 20 TABLET, FILM COATED ORAL at 20:34

## 2018-02-18 RX ADMIN — SODIUM CHLORIDE SCH MLS/HR: 234 INJECTION INTRAMUSCULAR; INTRAVENOUS; SUBCUTANEOUS at 01:10

## 2018-02-18 RX ADMIN — Medication SCH ML: at 08:37

## 2018-02-18 RX ADMIN — Medication SCH ML: at 20:34

## 2018-02-18 NOTE — RADRPT
EXAM DATE/TIME:  02/18/2018 18:49 

 

[facility] COMPARISON:     

No previous studies available for comparison.

        

 

 

INDICATIONS :                

Left arm swelling. 

            

 

MEDICAL HISTORY :        

Hypercholesterolemia. Hypertension. Pylonephritis. Syncope. Chronic UTIs. Back pain. 

 

SURGICAL HISTORY :     

Tubal ligation.    

 

ENCOUNTER:     

Initial

 

ACUITY:     

1 day

 

PAIN SCORE:      

0/10

 

LOCATION:      

Left  arm. 

                       

 

FINDINGS:     

There is occlusive thrombus within the left cephalic vein near the location of the IV line. Otherwise
, there is spontaneous flow documented in the brachial, basilic, axillary, and subclavian veins.  The
 vessels are compressible and augmentation response is documented.  No filling defects are seen.  The
 flow is phasic with respiration.  Direction of flow in the jugular vein is caudal.  

 

CONCLUSION:     

1. Occlusive thrombus within the cephalic vein near the IV.

2. Remainder of the left upper extremity veins including the internal jugular vein are patent.

 

 

 

 Jose Guadalupe Soto MD on February 18, 2018 at 19:47           

Board Certified Radiologist.

 This report was verified electronically.

## 2018-02-18 NOTE — HHI.PR
Subjective


Remarks


The pt wanted to sit in a chair.  She also wanted to know if she could have 

some fruit.  No acute concerns at this time.  Discussed with nursing.





Objective


Vitals





Vital Signs








  Date Time  Temp Pulse Resp B/P (MAP) Pulse Ox O2 Delivery O2 Flow Rate FiO2


 


2/18/18 08:00 97.9 70 17 137/77 (97) 99   


 


2/18/18 04:00 98.0 73 18 129/79 (96) 97   


 


2/18/18 00:04 96.3 74 18 144/66 (92) 97   


 


2/17/18 23:05  72      


 


2/17/18 20:00 96.3 78 18 123/78 (93) 97   


 


2/17/18 19:59  82      


 


2/17/18 16:00 98.6 75 17 137/78 (97) 98   


 


2/17/18 12:00 95.2 83 17 120/67 (84) 99   














I/O      


 


 2/17/18 2/17/18 2/17/18 2/18/18 2/18/18 2/18/18





 07:00 15:00 23:00 07:00 15:00 23:00


 


Intake Total 1240 ml  960 ml 480 ml  


 


Output Total 1550 ml  1725 ml 1525 ml  


 


Balance -310 ml  -765 ml -1045 ml  


 


      


 


Intake Oral 240 ml  960 ml 480 ml  


 


IV Total 1000 ml     


 


Output Urine Total 750 ml  525 ml 975 ml  


 


Drainage Total 800 ml  1200 ml 550 ml  


 


# Bowel Movements   0   








Result Diagram:  


2/17/18 0651                                                                   

             2/18/18 0657





Imaging





Last Impressions








Chest X-Ray 2/12/18 1124 Signed





Impressions: 





 Service Date/Time:  Monday, February 12, 2018 11:39 - CONCLUSION:  1. Left IJ 





 central line tip in the very proximal SVC. No pneumothorax. 2. Minimal 

bibasilar 





 airspace disease, likely atelectasis.     Mario Del Rosario MD 


 


Head CT 2/12/18 0046 Signed





Impressions: 





 Service Date/Time:  Monday, February 12, 2018 01:17 - CONCLUSION:  No 





 significant change has occurred.       Elie Francois MD 


 


Abdomen/Pelvis CT 2/12/18 0046 Signed





Impressions: 





 Service Date/Time:  Monday, February 12, 2018 01:19 - CONCLUSION:  Stable 





 staghorn calculus on the right and nonobstructing left renal calculi as well 

as 





 bilateral renal masses. Calcified uterine fibroids. Diverticulosis without 





 diverticulitis. There is some air in the endometrial cavity which may be 

related 





 to recent instrumentation.     Elie Francois MD 








Objective Remarks


GENERAL: No distress.


SKIN: Focused skin assessment warm/dry.  No rash.


HEAD: Atraumatic. Normocephalic. 


EYES: No scleral icterus. No injection or drainage. 


ENT: No nasal bleeding or discharge.  Mucous membranes pink and moist.


NECK: Trachea midline. No JVD. 


CARDIOVASCULAR: Regular rate and rhythm S1, S2.  No S4.  No murmur. 


RESPIRATORY: No accessory muscle use. Clear to auscultation. Breath sounds 

equal bilaterally. 


GASTROINTESTINAL: Abdomen soft, slightly tender in the right lower quadrant, 

nondistended.  Hypoactive bowel sounds are appreciated.


: Right nephrostomy tube in place. Ortega in place. 


MUSCULOSKELETAL: No obvious deformities.  No significant peripheral edema.  No 

tenderness to palpation of right hip, knee or ankle.


NEUROLOGICAL: Awake, minimally nonverbal. No obvious cranial nerve deficits.  

Motor grossly within normal limits.


PSYCH: Calm.


Medications and IVs





Current Medications








 Medications


  (Trade)  Dose


 Ordered  Sig/[place]


 Route  Start Time


 Stop Time Status Last Admin


 


  (Lipitor)  20 mg  HS


 PO  2/12/18 21:00


    2/17/18 20:11


 


 


  (Tylenol)  650 mg  Q6H  PRN


 PO  2/12/18 02:45


    2/12/18 18:49


 


 


  (Zofran Inj)  4 mg  Q6H  PRN


 IV PUSH  2/12/18 02:45


    2/15/18 20:54


 


 


 Miscellaneous


 Information  1  Q361D


 XX  2/12/18 02:45


     


 


 


  (Chlorhexidine


 2% Cloth)  


 Taper  DAILY@04


 TOP  2/12/18 04:00


 2/8/19 03:59   


 


 


  (Chlorhexidine


 2% Cloth)  3 pack  UNSCH  PRN


 TOP  2/12/18 02:45


     


 


 


  (Kaylee-Colace)  1 tab  BID


 PO  2/12/18 09:00


    2/18/18 08:11


 


 


  (Milk Of


 Magnesia Liq)  30 ml  Q12H  PRN


 PO  2/12/18 02:45


     


 


 


  (Senokot)  17.2 mg  Q12H  PRN


 PO  2/12/18 02:45


     


 


 


  (Dulcolax Supp)  10 mg  DAILY  PRN


 RECTAL  2/12/18 02:45


     


 


 


  (Lactulose Liq)  30 ml  DAILY  PRN


 PO  2/12/18 02:45


     


 


 


  (NS Flush)  2 ml  UNSCH  PRN


 IV FLUSH  2/12/18 02:45


     


 


 


  (NS Flush)  2 ml  BID


 IV FLUSH  2/12/18 09:00


    2/18/18 08:12


 


 


  (NS Flush)    DAILY


 IV FLUSH  2/12/18 11:30


    2/14/18 09:22


 


 


  (NS Flush)    UNSCH  PRN


 IV FLUSH  2/12/18 11:30


     


 


 


  (Albuterol Neb)  2.5 mg  Q2HR NEB  PRN


 NEB  2/12/18 13:45


     


 


 


  (Cathflo


 Activase Inj)  2 mg  Q2H  PRN


 INTRACATH  2/13/18 12:00


     


 


 


  (Protonix)  40 mg  DAILY


 PO  2/14/18 09:00


    2/18/18 08:11


 


 


  (Ferrous Sulfate)  325 mg  DAILY


 PO  2/14/18 09:00


    2/18/18 08:10


 


 


  (Vitamin C)  500 mg  DAILY


 PO  2/14/18 09:00


    2/18/18 08:11


 


 


  (D50w (Vial) Inj)  50 ml  UNSCH  PRN


 IV PUSH  2/13/18 10:00


     


 


 


  (Glucagon Inj)  1 mg  UNSCH  PRN


 OTHER  2/13/18 10:00


     


 


 


  (NovoLIN R


 SUPPLEMENTAL


 SCALE)  1  ACHS SLIDING  SCALE


 SQ  2/13/18 12:00


    2/16/18 20:22


 


 


 Dextrose  1,000 ml @ 


 50 mls/hr  Q20H


 IV  2/15/18 11:45


    2/17/18 03:19


 


 


 Lactated Ringer's  1,000 ml @ 


 30 mls/hr  Q24H PRN


 IV  2/16/18 06:45


 2/19/18 06:44   


 


 


  (Roxicodone)  5 mg  Q4H  PRN


 PO  2/17/18 12:30


    2/18/18 08:35


 








Date of Insertion:  Feb 12, 2018


Line:  Central Venous Catheter


Side:  Left


Location:  Internal, Jugular





A/P


Assessment and Plan


Sepsis


History of pansensitive Proteus mirabilis urinary tract infection. ID consult 

appreciated.  Urine culture noted.


- Discontinue piperacillin/tazobactam and monitor per ID.


- PT/ OT evaluate and treat.





Right staghorn calculus/ Nonobstructing left nephrolithiasis/ Renal masses


Status post percutaneous nephroureteral stent right-sided for right staghorn 

calculus/likely source of sepsis.


- Will need lithotripsy at some point in the future per urology.


- Maintain Ortega catheter.


- pain control with a bowel regimen.





Acute encephalopathy 


Secondary to severe sepsis/urinary tract source. CT brain 2/12 revealed no 

acute intracranial findings. Improving.


- treat underlying condition.


- avoid sedating meds.





HTN


Blood pressure well controlled 2/18.


- Holding lisinopril 20 daily light of hypotension/ acute kidney injury.  

Resume when clinically indicated.


- Resumed amlodipine 5 mg daily.





Hyperglycemia of critical illness


Recent A1c 5.1%.  Glucose well-controlled at this time.


- insulin sliding scale and Accu-Cheks.





Acute kidney injury


S/t above.  Resolved.


- treatment as above.


- Avoid nephrotoxic drugs.


- IVFs.


- Monitor BMP.





Bilateral uterine fibroids/ Endometrial cancer


Noted on imaging.


- Outpatient follow-up with OBGYN.





Microcytic anemia/ GIB


Status post 4 units PRBCs. BRBPR noted by nursing.  GI consult appreciated.  

Colonoscopy with diverticulosis and internal hemorrhoids.


- Monitor CBC and transfuse as needed.





Thrombocytopenia


Platelet count has been steadily decreasing.  She was on heparin products.  

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 D-dimer and haptoglobin elevated.


- Stop heparin.


- Check HIT panel. 


- Follow CBC and transfuse as needed.


- Hematology consult pending.





Hypernatremia/ hypokalemia/ hypophosphatemia


S/t decreased PO intake.


- follow BMP. Improved. 


- Wean off D5W.





PPx: Kamar Garcia DO Feb 18, 2018 12:02

## 2018-02-18 NOTE — MB
cc:

ALYSSA GILLESPIE RUBY ANNE E. M.D.

****

 

 

DATE OF CONSULTATION:  2018.

 

REASON FOR CONSULTATION / CHIEF COMPLAINT:

Dr. Gillespie requests a consultation for Ms. Park regarding worsening

thrombocytopenia.

 

YOB: 1945.

 

REFERRING PHYSICIAN:

Dr. Gillespie.

 

HISTORY OF PRESENT ILLNESS:

Mrs. Park is a 72-year-old woman who is a resident of skilled nursing

facility.  She was brought in by EMS for altered mental status change and a

temperature of 101.5.  She was diagnosed with urosepsis.  She required

nephrostomy tube on the right due to renal calculi.  Culture revealed Proteus

mirabilis which was sensitive to antibiotic therapy.  Her nephrostomy tube on

the right is still in place.

 

Limited history could be obtained from the patient.  She reports no fevers,

chills or night sweats.  She is able to report that she has some blood when she

wipes.  She believes this is from her vagina.  It occurred temporarily related

to her procedure which the findings show internal hemorrhoids.  She denies any

pain.

 

Hematology was consulted for the thrombocytopenia.  Her platelet count was

200,000 to 300,000 back in 2018.  On admission, her platelet count

was 393 decreasing to 258 on , decreasing further to 212 on  down to a platelet count of 47,000 on the day of the consultation.

 

HIV antibodies were negative.

 

The D dimer is elevated on the day of the consultation.

 

Baseline PT/PTT is not available.

 

Her antibiotic therapy now discontinued included vancomycin which she finished

on the .

 

Piperacillin / tazobactam  finished on the .

 

Ceftriaxone discontinued on the .

 

She has had heparin exposure but again the HIT antibodies were negative.

 

Clinically there is no evidence of venous thromboembolic event. The mean

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

 

CT scan of the abdomen and pelvis from   showed the liver and

spleen are unremarkable.

 

PAST MEDICAL HISTORY:

1. History of endometrial cancer.

2. Hypertension.

3. Nephrolithiasis.

4. Urosepsis.

5. Chronic anemia.

 

PAST SURGICAL HISTORY:

Tubal ligation.

 

FAMILY HISTORY:

Mother  in her 60s of unknown cause.  Father lived to be in his mid 90s.

 

SOCIAL HISTORY:

Denies any tobacco, alcohol or illicit drug use.

 

ALLERGIES:

NO KNOWN DRUG ALLERGIES.

 

CURRENT MEDICATIONS:

1. Oxycodone.

2. Protonix.

3. Ferrous sulfate.

4. Vitamin C.

5. Insulin.

6. Glucagon.

7. Lipitor.

8. Chlorhexidine.

9. Zofran PRN.

 

PHYSICAL EXAMINATION:

VITAL SIGNS:  Temperature 98.1, heart rate 75, respiratory rate 17, blood

pressure 126/79, saturation 98%.

GENERAL:  Ms. Park is a well-developed elderly woman who looks tired and

chronically ill.  She speaks slowly but is awake, alert and cooperative.

HEAD, EYES, EARS, NOSE, THROAT: Her pupils are round, reactive to light and

accommodation.  Oropharynx is dry.

NECK: The neck is supple.

LUNGS:  Lungs are clear to auscultation.

CARDIOVASCULAR: Exam reveals normal rate, rhythm.

ABDOMEN: Abdomen is distended and large.  No organomegaly appreciated.

EXTREMITIES: Lower extremities with thin dry skin.  Good pulses.  No evidence

of edema.  No cords.  PICC line in the left upper arm.

 

LABORATORY DATA:

As described above, calcium of 10.3, hemoglobin 10.70, platelet count of

47,000.

 

ASSESSMENT AND PLAN:

Ms. Park is a 72-year-old woman with multiple medical problems as described

above.  She is recovering from urosepsis from Proteus mirabilis pansensitive.

She has been taken off antibiotic therapy that may produce thrombocytopenia. It

was stopped on the  and her platelet count continues to decrease.

 

I discussed with Ms. Park our plan to evaluate further the bleeding.

Discussion with her nurse that it appears to be from the rectum correlating

with the internal hemorrhoids found by Dr. Marks.

 

We will check coags, PT/PTT and fibrinogen.

 

We will evaluate for any sign of ongoing sepsis.

 

Microangiopathic hemolytic process seems unlikely.  Haptoglobin is elevated.

LDH is not high.  Peripheral smear will be reviewed.  The increase in platelet

count suggests peripheral destruction. Baseline liver and spleen were normal.

 

Unable to rule out drug effect.  We will continue to monitor.  There is no

current medication that may produce the thrombocytopenia.

 

She has a PICC line a left upper arm.

 

Ultrasound will be obtained to rule out thrombotic event or catheter-related

clot.

 

HIT antibody again is negative.

 

Her questions were answered to her satisfaction.

 

 

 

                              _________________________________

                              Brynn MD SUDHA Collado/JCC

D:  2018/5:18 PM

T:  2018/5:34 PM

Visit #:  [id number]

Job #:  [id number]

## 2018-02-19 VITALS
OXYGEN SATURATION: 97 % | RESPIRATION RATE: 18 BRPM | TEMPERATURE: 98 F | SYSTOLIC BLOOD PRESSURE: 120 MMHG | HEART RATE: 79 BPM | DIASTOLIC BLOOD PRESSURE: 70 MMHG

## 2018-02-19 VITALS
DIASTOLIC BLOOD PRESSURE: 65 MMHG | OXYGEN SATURATION: 99 % | TEMPERATURE: 98.4 F | SYSTOLIC BLOOD PRESSURE: 105 MMHG | HEART RATE: 84 BPM | RESPIRATION RATE: 18 BRPM

## 2018-02-19 VITALS
OXYGEN SATURATION: 100 % | TEMPERATURE: 96.9 F | RESPIRATION RATE: 18 BRPM | SYSTOLIC BLOOD PRESSURE: 92 MMHG | DIASTOLIC BLOOD PRESSURE: 59 MMHG | HEART RATE: 86 BPM

## 2018-02-19 VITALS
TEMPERATURE: 97.3 F | RESPIRATION RATE: 18 BRPM | HEART RATE: 89 BPM | SYSTOLIC BLOOD PRESSURE: 111 MMHG | OXYGEN SATURATION: 96 % | DIASTOLIC BLOOD PRESSURE: 72 MMHG

## 2018-02-19 VITALS
SYSTOLIC BLOOD PRESSURE: 113 MMHG | HEART RATE: 88 BPM | OXYGEN SATURATION: 96 % | TEMPERATURE: 98.6 F | RESPIRATION RATE: 18 BRPM | DIASTOLIC BLOOD PRESSURE: 77 MMHG

## 2018-02-19 VITALS
TEMPERATURE: 97.3 F | DIASTOLIC BLOOD PRESSURE: 61 MMHG | SYSTOLIC BLOOD PRESSURE: 103 MMHG | HEART RATE: 85 BPM | RESPIRATION RATE: 18 BRPM | OXYGEN SATURATION: 97 %

## 2018-02-19 LAB
AMORPHOUS SEDIMENT, URINE: (no result)
BACTERIA #/AREA URNS HPF: (no result) /HPF
BUN SERPL-MCNC: 14 MG/DL (ref 7–18)
CALCIUM SERPL-MCNC: 10.1 MG/DL (ref 8.5–10.1)
CHLORIDE SERPL-SCNC: 105 MEQ/L (ref 98–107)
COLOR UR: YELLOW
CREAT SERPL-MCNC: 0.84 MG/DL (ref 0.5–1)
ERYTHROCYTE [DISTWIDTH] IN BLOOD BY AUTOMATED COUNT: 19.8 % (ref 11.6–17.2)
GFR SERPLBLD BASED ON 1.73 SQ M-ARVRAT: 80 ML/MIN (ref 89–?)
GLUCOSE SERPL-MCNC: 94 MG/DL (ref 74–106)
GLUCOSE UR STRIP-MCNC: (no result) MG/DL
HCO3 BLD-SCNC: 26.4 MEQ/L (ref 21–32)
HCT VFR BLD CALC: 32.2 % (ref 35–46)
HGB BLD-MCNC: 10.6 GM/DL (ref 11.6–15.3)
HGB UR QL STRIP: (no result)
KETONES UR STRIP-MCNC: (no result) MG/DL
MAGNESIUM SERPL-MCNC: 2.2 MG/DL (ref 1.5–2.5)
MCH RBC QN AUTO: 27.7 PG (ref 27–34)
MCHC RBC AUTO-ENTMCNC: 33 % (ref 32–36)
MCV RBC AUTO: 84.1 FL (ref 80–100)
MUCOUS THREADS #/AREA URNS LPF: (no result) /LPF
NITRITE UR QL STRIP: (no result)
PLATELET # BLD: 56 TH/MM3 (ref 150–450)
PMV BLD AUTO: 10.2 FL (ref 7–11)
RBC # BLD AUTO: 3.83 MIL/MM3 (ref 4–5.3)
SODIUM SERPL-SCNC: 140 MEQ/L (ref 136–145)
SP GR UR STRIP: 1.01 (ref 1–1.03)
SQUAMOUS #/AREA URNS HPF: 17 /HPF (ref 0–5)
URINE LEUKOCYTE ESTERASE: (no result)
WBC # BLD AUTO: 8.9 TH/MM3 (ref 4–11)

## 2018-02-19 RX ADMIN — ATORVASTATIN CALCIUM SCH MG: 20 TABLET, FILM COATED ORAL at 23:41

## 2018-02-19 RX ADMIN — PANTOPRAZOLE SCH MG: 40 TABLET, DELAYED RELEASE ORAL at 08:30

## 2018-02-19 RX ADMIN — Medication SCH ML: at 23:45

## 2018-02-19 RX ADMIN — STANDARDIZED SENNA CONCENTRATE AND DOCUSATE SODIUM SCH TAB: 8.6; 5 TABLET, FILM COATED ORAL at 21:00

## 2018-02-19 RX ADMIN — HUMAN INSULIN SCH: 100 INJECTION, SOLUTION SUBCUTANEOUS at 16:32

## 2018-02-19 RX ADMIN — CHLORHEXIDINE GLUCONATE SCH PACK: 500 CLOTH TOPICAL at 04:00

## 2018-02-19 RX ADMIN — STANDARDIZED SENNA CONCENTRATE AND DOCUSATE SODIUM SCH TAB: 8.6; 5 TABLET, FILM COATED ORAL at 08:30

## 2018-02-19 RX ADMIN — Medication SCH ML: at 09:00

## 2018-02-19 RX ADMIN — OXYCODONE HYDROCHLORIDE AND ACETAMINOPHEN SCH MG: 500 TABLET ORAL at 08:30

## 2018-02-19 RX ADMIN — HUMAN INSULIN SCH: 100 INJECTION, SOLUTION SUBCUTANEOUS at 08:00

## 2018-02-19 RX ADMIN — HUMAN INSULIN SCH: 100 INJECTION, SOLUTION SUBCUTANEOUS at 21:00

## 2018-02-19 RX ADMIN — Medication SCH ML: at 08:33

## 2018-02-19 RX ADMIN — HUMAN INSULIN SCH: 100 INJECTION, SOLUTION SUBCUTANEOUS at 12:00

## 2018-02-19 RX ADMIN — FERROUS SULFATE TAB 325 MG (65 MG ELEMENTAL FE) SCH MG: 325 (65 FE) TAB at 08:32

## 2018-02-19 NOTE — PD.ONC.PN
Subjective


Subjective


Remarks


"It burns at the beginning when I urinate.


It's been going on for a long time"





Objective


Data











  Date Time  Temp Pulse Resp B/P (MAP) Pulse Ox O2 Delivery O2 Flow Rate FiO2


 


2/19/18 12:00 96.9 86 18 92/59 (70) 100   


 


2/19/18 08:00 98.6 88 18 113/77 (89) 96   


 


2/19/18 05:27 97.3 85 18 103/61 (75) 97   


 


2/19/18 00:25 98.0 79 18 120/70 (87) 97   


 


2/18/18 20:21  93      


 


2/18/18 20:00 98.9 88 18 108/66 (80) 95   


 


2/18/18 16:00 98.1 75 17 126/79 (95) 98   














 2/19/18 2/19/18 2/19/18





 07:00 15:00 23:00


 


Intake Total 500 ml  


 


Output Total 1050 ml  


 


Balance -550 ml  








Result Diagram:  


2/19/18 0646                                                                   

             2/19/18 0646





Laboratory Results





Laboratory Tests








Test


  2/18/18


13:04 2/18/18


19:59 2/19/18


06:46


 


White Blood Count 7.2 TH/MM3   8.9 TH/MM3 


 


Red Blood Count 3.91 MIL/MM3   3.83 MIL/MM3 


 


Hemoglobin 10.7 GM/DL   10.6 GM/DL 


 


Hematocrit 32.8 %   32.2 % 


 


Mean Corpuscular Volume 83.7 FL   84.1 FL 


 


Mean Corpuscular Hemoglobin 27.3 PG   27.7 PG 


 


Mean Corpuscular Hemoglobin


Concent 32.6 % 


  


  33.0 % 


 


 


Red Cell Distribution Width 19.9 %   19.8 % 


 


Platelet Count 47 TH/MM3   56 TH/MM3 


 


Mean Platelet Volume 10.4 FL   10.2 FL 


 


Prothrombin Time  11.2 SEC  


 


Prothromb Time International


Ratio 


  1.1 RATIO 


  


 


 


Activated Partial


Thromboplast Time 


  22.9 SEC 


  


 


 


Fibrinogen  548 mg/dL  


 


Mix DRVV Patient/Normal 1:1    


 


Blood Urea Nitrogen   14 MG/DL 


 


Creatinine   0.84 MG/DL 


 


Random Glucose   94 MG/DL 


 


Calcium Level   10.1 MG/DL 


 


Magnesium Level   2.2 MG/DL 


 


Sodium Level   140 MEQ/L 


 


Potassium Level   4.0 MEQ/L 


 


Chloride Level   105 MEQ/L 


 


Carbon Dioxide Level   26.4 MEQ/L 


 


Anion Gap   9 MEQ/L 


 


Estimat Glomerular Filtration


Rate 


  


  80 ML/MIN 


 











Administered Medications








 Medications


  (Trade)  Dose


 Ordered  Sig/[place]


 Route


 PRN Reason  Start Time


 Stop Time Status Last Admin


Dose Admin


 


 Atorvastatin


 Calcium


  (Lipitor)  20 mg  HS


 PO


   2/12/18 21:00


    2/18/18 20:34


 


 


 Acetaminophen


  (Tylenol)  650 mg  Q6H  PRN


 PO


 PAIN 1-5 AND/OR FEVER >101F  2/12/18 02:45


    2/12/18 18:49


 


 


 Ondansetron HCl


  (Zofran Inj)  4 mg  Q6H  PRN


 IV PUSH


 NAUSEA OR VOMITING  2/12/18 02:45


    2/15/18 20:54


 


 


 Senna/Docusate


 Sodium


  (Kaylee-Colace)  1 tab  BID


 PO


   2/12/18 09:00


    2/18/18 20:34


 


 


 Sodium Chloride


  (NS Flush)  2 ml  BID


 IV FLUSH


   2/12/18 09:00


    2/19/18 08:33


 


 


 Sodium Chloride


  (NS Flush)    DAILY


 IV FLUSH


   2/12/18 11:30


    2/14/18 09:22


 


 


 Pantoprazole


 Sodium


  (Protonix)  40 mg  DAILY


 PO


   2/14/18 09:00


    2/19/18 08:30


 


 


 Ferrous Sulfate


  (Ferrous Sulfate)  325 mg  DAILY


 PO


   2/14/18 09:00


    2/19/18 08:32


 


 


 Ascorbic Acid


  (Vitamin C)  500 mg  DAILY


 PO


   2/14/18 09:00


    2/19/18 08:30


 


 


 Insulin Human


 Regular


  (NovoLIN R


 SUPPLEMENTAL


 SCALE)  1  ACHS SLIDING  SCALE


 SQ


   2/13/18 12:00


    2/19/18 12:00


 


 


 Dextrose  1,000 ml @ 


 50 mls/hr  Q20H


 IV


   2/15/18 11:45


    2/17/18 03:19


 


 


 Oxycodone HCl


  (Roxicodone)  5 mg  Q4H  PRN


 PO


 pain 6-10  2/17/18 12:30


    2/19/18 12:15


 








Objective Remarks


GENERAL: elderly, chronic tired and weak, well-developed patient.


SKIN: Warm and dry.


HEAD: Normocephalic.


EYES: No scleral icterus. No injection or drainage. 


NECK: Supple, trachea midline. No JVD or lymphadenopathy.


LYMPHATIC: No adenopathy.  LUE pic line.  Marginal swelling upper arm.


CARDIOVASCULAR: Regular rate and rhythm without murmurs. 


RESPIRATORY: Breath sounds equal bilaterally. No accessory muscle use.


GASTROINTESTINAL: Abdomen soft, non-tender, nondistended. 


EXTREMITIES: No cyanosis, or edema. 


MUSCULOSKELETAL: Adequate muscle tone.





Assessment/Plan


Problem List:  


(1) Thrombocytopenia


ICD Codes:  D69.6 - Thrombocytopenia, unspecified


Status:  Acute


Plan:  Noted some recovery after stopping abx.


No evidence for DIC.


Dx with UE DVT, catheter associated.


Hold off anticoagulation since she is c/o BRBPR


Trial Preparation H.


Anticipate starting Lovenox 40mg SQ daily pending response to tx hemorrhoids.





Assessment


71 y/o woman with multiple medical problems with thrombocytopenia.


Plan


Follow platelet count.


Hemorrhoid care.


Urethritis check UA.











Brynn Perry MD Feb 19, 2018 12:49

## 2018-02-19 NOTE — HHI.PR
Subjective


Remarks


The patient was resting comfortably in bed.  She said she worked with physical 

therapy.  She had no acute complaints.  Discussed with nursing.





Objective


Vitals





Vital Signs








  Date Time  Temp Pulse Resp B/P (MAP) Pulse Ox O2 Delivery O2 Flow Rate FiO2


 


2/19/18 12:00 96.9 86 18 92/59 (70) 100   


 


2/19/18 08:00 98.6 88 18 113/77 (89) 96   


 


2/19/18 05:27 97.3 85 18 103/61 (75) 97   


 


2/19/18 00:25 98.0 79 18 120/70 (87) 97   


 


2/18/18 20:21  93      


 


2/18/18 20:00 98.9 88 18 108/66 (80) 95   


 


2/18/18 16:00 98.1 75 17 126/79 (95) 98   














I/O      


 


 2/18/18 2/18/18 2/18/18 2/19/18 2/19/18 2/19/18





 07:00 15:00 23:00 07:00 15:00 23:00


 


Intake Total 480 ml  960 ml 500 ml  


 


Output Total 1525 ml  1400 ml 1050 ml  


 


Balance -1045 ml  -440 ml -550 ml  


 


      


 


Intake Oral 480 ml  960 ml 500 ml  


 


Output Urine Total 975 ml  750 ml 800 ml  


 


Drainage Total 550 ml  650 ml 250 ml  


 


# Bowel Movements   2   








Result Diagram:  


2/19/18 0646                                                                   

             2/19/18 0646





Imaging





Last Impressions








Upper Extremity Ultrasound 2/18/18 0000 Signed





Impressions: 





 Service Date/Time:  Sunday, February 18, 2018 18:49 - CONCLUSION:  1. 

Occlusive 





 thrombus within the cephalic vein near the IV. 2. Remainder of the left upper 





 extremity veins including the internal jugular vein are patent.     Jose Guadalupe Soto MD 


 


Chest X-Ray 2/12/18 1124 Signed





Impressions: 





 Service Date/Time:  Monday, February 12, 2018 11:39 - CONCLUSION:  1. Left IJ 





 central line tip in the very proximal SVC. No pneumothorax. 2. Minimal 

bibasilar 





 airspace disease, likely atelectasis.     Mario Del Rosario MD 


 


Head CT 2/12/18 0046 Signed





Impressions: 





 Service Date/Time:  Monday, February 12, 2018 01:17 - CONCLUSION:  No 





 significant change has occurred.       Elie Francois MD 


 


Abdomen/Pelvis CT 2/12/18 0046 Signed





Impressions: 





 Service Date/Time:  Monday, February 12, 2018 01:19 - CONCLUSION:  Stable 





 staghorn calculus on the right and nonobstructing left renal calculi as well 

as 





 bilateral renal masses. Calcified uterine fibroids. Diverticulosis without 





 diverticulitis. There is some air in the endometrial cavity which may be 

related 





 to recent instrumentation.     Elie Francois MD 








Objective Remarks


GENERAL: No distress.


SKIN: Focused skin assessment warm/dry.  No rash.


HEAD: Atraumatic. Normocephalic. 


EYES: No scleral icterus. No injection or drainage. 


ENT: No nasal bleeding or discharge.  Mucous membranes pink and moist.


NECK: Trachea midline. No JVD. 


CARDIOVASCULAR: Regular rate and rhythm S1, S2.  No S4.  No murmur. 


RESPIRATORY: No accessory muscle use. Clear to auscultation. Breath sounds 

equal bilaterally. 


GASTROINTESTINAL: Abdomen soft, slightly tender in the right lower quadrant, 

nondistended.  Hypoactive bowel sounds are appreciated.


: Right nephrostomy tube in place. Ortega in place. 


MUSCULOSKELETAL: No obvious deformities.  No significant peripheral edema.  No 

tenderness to palpation of right hip, knee or ankle.


NEUROLOGICAL: Awake, minimally nonverbal. No obvious cranial nerve deficits.  

Motor grossly within normal limits.


PSYCH: Calm.


Medications and IVs





Current Medications








 Medications


  (Trade)  Dose


 Ordered  Sig/[place]


 Route  Start Time


 Stop Time Status Last Admin


 


  (Lipitor)  20 mg  HS


 PO  2/12/18 21:00


    2/18/18 20:34


 


 


  (Tylenol)  650 mg  Q6H  PRN


 PO  2/12/18 02:45


    2/12/18 18:49


 


 


  (Zofran Inj)  4 mg  Q6H  PRN


 IV PUSH  2/12/18 02:45


    2/15/18 20:54


 


 


 Miscellaneous


 Information  1  Q361D


 XX  2/12/18 02:45


     


 


 


  (Chlorhexidine


 2% Cloth)  


 Taper  DAILY@04


 TOP  2/12/18 04:00


 2/8/19 03:59   


 


 


  (Chlorhexidine


 2% Cloth)  3 pack  UNSCH  PRN


 TOP  2/12/18 02:45


     


 


 


  (Kaylee-Colace)  1 tab  BID


 PO  2/12/18 09:00


    2/18/18 20:34


 


 


  (Milk Of


 Magnesia Liq)  30 ml  Q12H  PRN


 PO  2/12/18 02:45


     


 


 


  (Senokot)  17.2 mg  Q12H  PRN


 PO  2/12/18 02:45


     


 


 


  (Dulcolax Supp)  10 mg  DAILY  PRN


 RECTAL  2/12/18 02:45


     


 


 


  (Lactulose Liq)  30 ml  DAILY  PRN


 PO  2/12/18 02:45


     


 


 


  (NS Flush)  2 ml  UNSCH  PRN


 IV FLUSH  2/12/18 02:45


     


 


 


  (NS Flush)  2 ml  BID


 IV FLUSH  2/12/18 09:00


    2/19/18 08:33


 


 


  (NS Flush)    DAILY


 IV FLUSH  2/12/18 11:30


    2/14/18 09:22


 


 


  (NS Flush)    UNSCH  PRN


 IV FLUSH  2/12/18 11:30


     


 


 


  (Albuterol Neb)  2.5 mg  Q2HR NEB  PRN


 NEB  2/12/18 13:45


     


 


 


  (Cathflo


 Activase Inj)  2 mg  Q2H  PRN


 INTRACATH  2/13/18 12:00


     


 


 


  (Protonix)  40 mg  DAILY


 PO  2/14/18 09:00


    2/19/18 08:30


 


 


  (Ferrous Sulfate)  325 mg  DAILY


 PO  2/14/18 09:00


    2/19/18 08:32


 


 


  (Vitamin C)  500 mg  DAILY


 PO  2/14/18 09:00


    2/19/18 08:30


 


 


  (D50w (Vial) Inj)  50 ml  UNSCH  PRN


 IV PUSH  2/13/18 10:00


     


 


 


  (Glucagon Inj)  1 mg  UNSCH  PRN


 OTHER  2/13/18 10:00


     


 


 


  (NovoLIN R


 SUPPLEMENTAL


 SCALE)  1  ACHS SLIDING  SCALE


 SQ  2/13/18 12:00


    2/19/18 12:00


 


 


 Dextrose  1,000 ml @ 


 50 mls/hr  Q20H


 IV  2/15/18 11:45


    2/17/18 03:19


 


 


  (Roxicodone)  5 mg  Q4H  PRN


 PO  2/17/18 12:30


    2/19/18 12:15


 


 


  (Preparation H


 Oint)  1 applic  Q12HR  PRN


 TOPICAL  2/19/18 13:00


     


 








Date of Insertion:  Feb 12, 2018


Line:  Central Venous Catheter


Side:  Left


Location:  Internal, Jugular





A/P


Assessment and Plan


Sepsis


History of pansensitive Proteus mirabilis urinary tract infection. ID consult 

appreciated.  Urine culture noted.


- Discontinue piperacillin/tazobactam and monitor per ID.


- PT/ OT evaluate and treat.





Right staghorn calculus/ Nonobstructing left nephrolithiasis/ Renal masses


Status post percutaneous nephroureteral stent right-sided for right staghorn 

calculus/likely source of sepsis.


- Will need lithotripsy at some point in the future per urology.


- Maintain Ortega catheter. Remove at time of discharge per urology.


- pain control with a bowel regimen.





Acute encephalopathy 


Secondary to severe sepsis/urinary tract source. CT brain 2/12 revealed no 

acute intracranial findings. Improving.


- treat underlying condition.


- avoid sedating meds.





HTN


Blood pressure on the low side 2/19.


- Holding lisinopril and amlodipine in light of hypotension/ acute kidney 

injury.  Resume when clinically indicated.





Hyperglycemia of critical illness


Recent A1c 5.1%.  Glucose well-controlled at this time.


- insulin sliding scale and Accu-Cheks.





Acute kidney injury


S/t above.  Resolved.


- treatment as above.


- Avoid nephrotoxic drugs.


- IVFs.


- Monitor BMP.





Bilateral uterine fibroids/ Endometrial cancer


Noted on imaging.


- Outpatient follow-up with OBGYN.





Microcytic anemia/ GIB


Status post 4 units PRBCs. BRBPR noted by nursing.  GI consult appreciated.  

Colonoscopy with diverticulosis and internal hemorrhoids.


- Monitor CBC and transfuse as needed.





Thrombocytopenia/ LUE DVT


Platelet count has been steadily decreasing.  She was on heparin products.  HIT 

negative. Hematology consult appreciated. DVT noted in LUE. 


- Follow CBC and transfuse as needed.


- hold off on anticoagulation per hematology.





Hypernatremia/ hypokalemia/ hypophosphatemia


S/t decreased PO intake.


- follow BMP


- d/c D5W.





PPx: SCDs


Discharge Planning


Awaiting stabilization of platelet count and clearance from hematology. 

Hopefully d/c to SNF in 1-2 days











Kamar Kahn DO Feb 19, 2018 13:42

## 2018-02-20 VITALS
TEMPERATURE: 98.5 F | HEART RATE: 78 BPM | DIASTOLIC BLOOD PRESSURE: 53 MMHG | RESPIRATION RATE: 18 BRPM | SYSTOLIC BLOOD PRESSURE: 94 MMHG | OXYGEN SATURATION: 95 %

## 2018-02-20 VITALS
RESPIRATION RATE: 18 BRPM | TEMPERATURE: 98.7 F | HEART RATE: 94 BPM | DIASTOLIC BLOOD PRESSURE: 69 MMHG | SYSTOLIC BLOOD PRESSURE: 117 MMHG | OXYGEN SATURATION: 97 %

## 2018-02-20 VITALS
DIASTOLIC BLOOD PRESSURE: 59 MMHG | OXYGEN SATURATION: 96 % | RESPIRATION RATE: 18 BRPM | HEART RATE: 80 BPM | TEMPERATURE: 99 F | SYSTOLIC BLOOD PRESSURE: 100 MMHG

## 2018-02-20 VITALS
SYSTOLIC BLOOD PRESSURE: 103 MMHG | TEMPERATURE: 98.7 F | HEART RATE: 82 BPM | DIASTOLIC BLOOD PRESSURE: 68 MMHG | RESPIRATION RATE: 18 BRPM | OXYGEN SATURATION: 98 %

## 2018-02-20 VITALS
RESPIRATION RATE: 18 BRPM | DIASTOLIC BLOOD PRESSURE: 61 MMHG | HEART RATE: 78 BPM | TEMPERATURE: 96.8 F | SYSTOLIC BLOOD PRESSURE: 102 MMHG | OXYGEN SATURATION: 95 %

## 2018-02-20 VITALS
DIASTOLIC BLOOD PRESSURE: 69 MMHG | HEART RATE: 95 BPM | RESPIRATION RATE: 19 BRPM | OXYGEN SATURATION: 98 % | TEMPERATURE: 96.6 F | SYSTOLIC BLOOD PRESSURE: 108 MMHG

## 2018-02-20 LAB
BUN SERPL-MCNC: 10 MG/DL (ref 7–18)
CALCIUM SERPL-MCNC: 10.7 MG/DL (ref 8.5–10.1)
CHLORIDE SERPL-SCNC: 104 MEQ/L (ref 98–107)
CREAT SERPL-MCNC: 0.77 MG/DL (ref 0.5–1)
ERYTHROCYTE [DISTWIDTH] IN BLOOD BY AUTOMATED COUNT: 20.2 % (ref 11.6–17.2)
GFR SERPLBLD BASED ON 1.73 SQ M-ARVRAT: 89 ML/MIN (ref 89–?)
GLUCOSE SERPL-MCNC: 122 MG/DL (ref 74–106)
HCO3 BLD-SCNC: 26.5 MEQ/L (ref 21–32)
HCT VFR BLD CALC: 35.2 % (ref 35–46)
HGB BLD-MCNC: 11.9 GM/DL (ref 11.6–15.3)
MCH RBC QN AUTO: 28.1 PG (ref 27–34)
MCHC RBC AUTO-ENTMCNC: 33.8 % (ref 32–36)
MCV RBC AUTO: 83.3 FL (ref 80–100)
PLATELET # BLD: 97 TH/MM3 (ref 150–450)
PMV BLD AUTO: 9.7 FL (ref 7–11)
RBC # BLD AUTO: 4.22 MIL/MM3 (ref 4–5.3)
SODIUM SERPL-SCNC: 137 MEQ/L (ref 136–145)
WBC # BLD AUTO: 10.6 TH/MM3 (ref 4–11)

## 2018-02-20 RX ADMIN — HUMAN INSULIN SCH: 100 INJECTION, SOLUTION SUBCUTANEOUS at 12:00

## 2018-02-20 RX ADMIN — PANTOPRAZOLE SCH MG: 40 TABLET, DELAYED RELEASE ORAL at 07:48

## 2018-02-20 RX ADMIN — Medication SCH ML: at 07:55

## 2018-02-20 RX ADMIN — HUMAN INSULIN SCH: 100 INJECTION, SOLUTION SUBCUTANEOUS at 20:33

## 2018-02-20 RX ADMIN — Medication SCH ML: at 20:34

## 2018-02-20 RX ADMIN — HUMAN INSULIN SCH: 100 INJECTION, SOLUTION SUBCUTANEOUS at 17:00

## 2018-02-20 RX ADMIN — STANDARDIZED SENNA CONCENTRATE AND DOCUSATE SODIUM SCH TAB: 8.6; 5 TABLET, FILM COATED ORAL at 20:33

## 2018-02-20 RX ADMIN — STANDARDIZED SENNA CONCENTRATE AND DOCUSATE SODIUM SCH TAB: 8.6; 5 TABLET, FILM COATED ORAL at 07:48

## 2018-02-20 RX ADMIN — FERROUS SULFATE TAB 325 MG (65 MG ELEMENTAL FE) SCH MG: 325 (65 FE) TAB at 07:48

## 2018-02-20 RX ADMIN — HUMAN INSULIN SCH: 100 INJECTION, SOLUTION SUBCUTANEOUS at 07:47

## 2018-02-20 RX ADMIN — Medication SCH ML: at 07:56

## 2018-02-20 RX ADMIN — CHLORHEXIDINE GLUCONATE SCH PACK: 500 CLOTH TOPICAL at 03:56

## 2018-02-20 RX ADMIN — OXYCODONE HYDROCHLORIDE AND ACETAMINOPHEN SCH MG: 500 TABLET ORAL at 07:48

## 2018-02-20 RX ADMIN — ATORVASTATIN CALCIUM SCH MG: 20 TABLET, FILM COATED ORAL at 20:24

## 2018-02-20 NOTE — PD.ONC.PN
Subjective


Subjective


Remarks


Afebrile overnight. 


patient resting in bed in nad. 


denies rectal bleeding. 


asking for additional pain medications for her back.





Objective


Data











  Date Time  Temp Pulse Resp B/P (MAP) Pulse Ox O2 Delivery O2 Flow Rate FiO2


 


2/20/18 08:00 98.7 82 18 103/68 (80) 98   


 


2/20/18 04:00 99.0 80 18 100/59 (73) 96   


 


2/20/18 00:00 96.8 78 18 102/61 (75) 95   


 


2/19/18 20:00 97.3 89 18 111/72 (85) 96   


 


2/19/18 16:00 98.4 84 18 105/65 (78) 99   


 


2/19/18 12:00 96.9 86 18 92/59 (70) 100   














 2/20/18 2/20/18 2/20/18





 07:00 15:00 23:00


 


Output Total 400 ml  


 


Balance -400 ml  








Result Diagram:  


2/19/18 0646                                                                   

             2/19/18 0646





Laboratory Results





Laboratory Tests








Test


  2/19/18


18:40


 


Urine Color YELLOW 


 


Urine Turbidity HAZY 


 


Urine pH 5.5 


 


Urine Specific Gravity 1.012 


 


Urine Protein 30 mg/dL 


 


Urine Glucose (UA) NEG mg/dL 


 


Urine Ketones NEG mg/dL 


 


Urine Occult Blood MOD 


 


Urine Nitrite NEG 


 


Urine Bilirubin NEG 


 


Urine Urobilinogen


  LESS THAN 2.0


MG/DL


 


Urine Leukocyte Esterase LARGE 


 


Urine RBC 35 /hpf 


 


Urine WBC 99 /hpf 


 


Urine Squamous Epithelial


Cells 17 /hpf 


 


 


Urine Calcium Oxalate Crystals RARE /hpf 


 


Urine Amorphous Sediment RARE 


 


Urine Bacteria OCC /hpf 


 


Urine Mucus MANY /lpf 


 


Urine Yeast (Budding) OCC 


 


Microscopic Urinalysis Comment


  CULTURE


INDICATED


 


Urine Eosinophils RARE /HPF 








Culture Results





Microbiology








 Date/Time


Source Procedure


Growth Status


 


 


 2/19/18 18:40


Urine Random Urine Urine Culture


Pending Received








Imaging Studies





Last Impressions








Upper Extremity Ultrasound 2/18/18 0000 Signed





Impressions: 





 Service Date/Time:  Sunday, February 18, 2018 18:49 - CONCLUSION:  1. 

Occlusive 





 thrombus within the cephalic vein near the IV. 2. Remainder of the left upper 





 extremity veins including the internal jugular vein are patent.     Jose Guadalupe Soto MD 


 


Chest X-Ray 2/12/18 1124 Signed





Impressions: 





 Service Date/Time:  Monday, February 12, 2018 11:39 - CONCLUSION:  1. Left IJ 





 central line tip in the very proximal SVC. No pneumothorax. 2. Minimal 

bibasilar 





 airspace disease, likely atelectasis.     Mario Del Rosario MD 


 


Head CT 2/12/18 0046 Signed





Impressions: 





 Service Date/Time:  Monday, February 12, 2018 01:17 - CONCLUSION:  No 





 significant change has occurred.       Elie Francois MD 


 


Abdomen/Pelvis CT 2/12/18 0046 Signed





Impressions: 





 Service Date/Time:  Monday, February 12, 2018 01:19 - CONCLUSION:  Stable 





 staghorn calculus on the right and nonobstructing left renal calculi as well 

as 





 bilateral renal masses. Calcified uterine fibroids. Diverticulosis without 





 diverticulitis. There is some air in the endometrial cavity which may be 

related 





 to recent instrumentation.     Elie Francois MD 











Administered Medications








 Medications


  (Trade)  Dose


 Ordered  Sig/[place]


 Route


 PRN Reason  Start Time


 Stop Time Status Last Admin


Dose Admin


 


 Atorvastatin


 Calcium


  (Lipitor)  20 mg  HS


 PO


   2/12/18 21:00


    2/19/18 23:41


 


 


 Acetaminophen


  (Tylenol)  650 mg  Q6H  PRN


 PO


 PAIN 1-5 AND/OR FEVER >101F  2/12/18 02:45


    2/12/18 18:49


 


 


 Ondansetron HCl


  (Zofran Inj)  4 mg  Q6H  PRN


 IV PUSH


 NAUSEA OR VOMITING  2/12/18 02:45


    2/15/18 20:54


 


 


 Senna/Docusate


 Sodium


  (Kaylee-Colace)  1 tab  BID


 PO


   2/12/18 09:00


    2/20/18 07:48


 


 


 Sodium Chloride


  (NS Flush)  2 ml  BID


 IV FLUSH


   2/12/18 09:00


    2/20/18 07:55


 


 


 Sodium Chloride


  (NS Flush)    DAILY


 IV FLUSH


   2/12/18 11:30


    2/14/18 09:22


 


 


 Pantoprazole


 Sodium


  (Protonix)  40 mg  DAILY


 PO


   2/14/18 09:00


    2/20/18 07:48


 


 


 Ferrous Sulfate


  (Ferrous Sulfate)  325 mg  DAILY


 PO


   2/14/18 09:00


    2/20/18 07:48


 


 


 Ascorbic Acid


  (Vitamin C)  500 mg  DAILY


 PO


   2/14/18 09:00


    2/20/18 07:48


 


 


 Insulin Human


 Regular


  (NovoLIN R


 SUPPLEMENTAL


 SCALE)  1  ACHS SLIDING  SCALE


 SQ


   2/13/18 12:00


    2/19/18 16:32


 


 


 Oxycodone HCl


  (Roxicodone)  5 mg  Q4H  PRN


 PO


 pain 6-10  2/17/18 12:30


    2/20/18 10:02


 








Objective Remarks


GENERAL: Elderly female, sitting up in bed resting in nad. she appears 

comfortable and in nad. 


SKIN: Warm and dry.


HEAD: Normocephalic.


EYES: No injection or drainage. 


NECK: Supple, trachea midline. 


CARDIOVASCULAR: Regular rate and rhythm


RESPIRATORY: Breath sounds equal bilaterally. No accessory muscle use.


GASTROINTESTINAL: Abdomen soft, non-tender, nondistended. 


EXTREMITIES: No cyanosis


NEUROLOGICAL: awake and alert, normal speech.





Assessment/Plan


Problem List:  


(1) Thrombocytopenia


ICD Codes:  D69.6 - Thrombocytopenia, unspecified


Status:  Acute


Plan:  Noted some recovery after stopping abx.


No evidence for DIC.


Dx with UE cephalic vein thrombosis, catheter associated.


Hold off anticoagulation since she is c/o BRBPR


Trial Preparation H.


Anticipate starting Lovenox 40mg SQ daily pending response to tx hemorrhoids.





2/20: patient denies BRBPR. await CBC Today. 





Assessment


73y/o female initially admitted with AMS, urosepsis, s/p placement of right 

sided nephrostomy tube d/t renal calculi.  Hematology consulted for 

thrombocytopenia. 


History of endometrial cancer.Hypertension. Nephrolithiasis. Urosepsis. Chronic 

anemia.


Plan


1. await CBC today. 


2. continue to watch closely for bleeding.


Attending Statement


The exam, history, and the medical decision-making described in the above note 

were completed with the assistance of the mid-level provider. I reviewed and 

agree with the findings presented.  I attest that I had a face-to-face 

encounter with the patient on the same day, and personally performed and 

documented my assessment and findings in the medical record.





Noted platelet recovery with stopping abx.


Anticipate start LMWH DVT prophylaxis dose.


Pt  at bedside providing support.


Continue to follow.











Grace Ignacio Feb 20, 2018 11:22


Brynn Perry MD Feb 20, 2018 20:11

## 2018-02-20 NOTE — HHI.PR
Subjective


Remarks


in no acute distress.


afebrile.


complaining of some back pain.





Objective


Vitals





Vital Signs








  Date Time  Temp Pulse Resp B/P (MAP) Pulse Ox O2 Delivery O2 Flow Rate FiO2


 


2/20/18 04:00 99.0 80 18 100/59 (73) 96   


 


2/20/18 00:00 96.8 78 18 102/61 (75) 95   


 


2/19/18 20:00 97.3 89 18 111/72 (85) 96   


 


2/19/18 16:00 98.4 84 18 105/65 (78) 99   


 


2/19/18 12:00 96.9 86 18 92/59 (70) 100   














I/O      


 


 2/19/18 2/19/18 2/19/18 2/20/18 2/20/18 2/20/18





 07:00 15:00 23:00 07:00 15:00 23:00


 


Intake Total 500 ml  480 ml   


 


Output Total 1050 ml  550 ml 400 ml  


 


Balance -550 ml  -70 ml -400 ml  


 


      


 


Intake Oral 500 ml  480 ml   


 


Output Urine Total 800 ml  550 ml 400 ml  


 


Drainage Total 250 ml     


 


# Bowel Movements   0 1  








Result Diagram:  


2/19/18 0646                                                                   

             2/19/18 0646





Imaging





Last Impressions








Upper Extremity Ultrasound 2/18/18 0000 Signed





Impressions: 





 Service Date/Time:  Sunday, February 18, 2018 18:49 - CONCLUSION:  1. 

Occlusive 





 thrombus within the cephalic vein near the IV. 2. Remainder of the left upper 





 extremity veins including the internal jugular vein are patent.     Jose Guadalupe Soto MD 


 


Chest X-Ray 2/12/18 1124 Signed





Impressions: 





 Service Date/Time:  Monday, February 12, 2018 11:39 - CONCLUSION:  1. Left IJ 





 central line tip in the very proximal SVC. No pneumothorax. 2. Minimal 

bibasilar 





 airspace disease, likely atelectasis.     Mario Del Rosario MD 


 


Head CT 2/12/18 0046 Signed





Impressions: 





 Service Date/Time:  Monday, February 12, 2018 01:17 - CONCLUSION:  No 





 significant change has occurred.       Elie Francois MD 


 


Abdomen/Pelvis CT 2/12/18 0046 Signed





Impressions: 





 Service Date/Time:  Monday, February 12, 2018 01:19 - CONCLUSION:  Stable 





 staghorn calculus on the right and nonobstructing left renal calculi as well 

as 





 bilateral renal masses. Calcified uterine fibroids. Diverticulosis without 





 diverticulitis. There is some air in the endometrial cavity which may be 

related 





 to recent instrumentation.     Elie Francois MD 








Objective Remarks


GENERAL: This is a well-nourished, well-developed patient, in no apparent 

distress.


CARDIOVASCULAR: Regular rate and regular rhythm without murmurs, gallops, or 

rubs. 


RESPIRATORY: Clear to auscultation. Breath sounds equal bilaterally. No wheezes

, rales, or rhonchi.  


GASTROINTESTINAL: Abdomen soft, non-tender, nondistended. 


Normal, active bowel sounds


MUSCULOSKELETAL: Extremities without clubbing, cyanosis, or edema.


NEURO:  Alert & Oriented x4 to person, place, time, situation.  Moves all ext x4


Medications and IVs





Inpatient Medications


Acetaminophen (Tylenol) 650 mg Q6H  PRN PO PAIN 1-5 AND/OR FEVER >101F Last 

administered on 2/12/18at 18:49;  Start 2/12/18 at 02:45


Albuterol Sulfate (Albuterol Neb) 2.5 mg Q2HR NEB  PRN NEB dyspnea;  Start 2/12/ 18 at 13:45


Albuterol/ Ipratropium (Duoneb Neb) 1 ampule Q2HR NEB  PRN INH WHEEZING;  Start 

2/12/18 at 02:45;  Stop 2/12/18 at 10:00;  Status DC


Alteplase, Recombinant (Cathflo Activase Inj) 2 mg ONCE  ONCE INTRACATH ;  

Start 2/13/18 at 12:00;  Stop 2/13/18 at 12:01;  Status DC


Ascorbic Acid (Vitamin C) 500 mg DAILY PO  Last administered on 2/20/18at 07:48

;  Start 2/14/18 at 09:00


Atorvastatin Calcium (Lipitor) 20 mg HS PO  Last administered on 2/19/18at 23:41

;  Start 2/12/18 at 21:00


Bisacodyl (Dulcolax Supp) 10 mg DAILY  PRN RECTAL SEVERE CONSITIPATION;  Start 2 /12/18 at 02:45


Ceftriaxone Sodium 1000 mg/ Sodium Chloride 100 ml @  200 mls/hr ONCE  ONCE IV  

Last administered on 2/12/18at 01:31;  Start 2/12/18 at 01:30;  Stop 2/12/18 at 

01:59;  Status DC


Chlorhexidine Gluconate (Chlorhexidine 2% Cloth) 3 pack UNSCH  PRN TOP HYGIENIC 

CARE;  Start 2/12/18 at 02:45


Dextrose 1,000 ml @  50 mls/hr Q20H IV  Last administered on 2/17/18at 03:19;  

Start 2/15/18 at 11:45;  Stop 2/19/18 at 13:39;  Status DC


Dextrose (D50w (Vial) Inj) 50 ml UNSCH  PRN IV PUSH HYPOGLYCEMIA-SEE COMMENTS;  

Start 2/13/18 at 10:00


Famotidine (Pepcid Inj) 10 mg Q12HR IV PUSH ;  Start 2/12/18 at 09:00;  Stop 2/ 12/18 at 10:00;  Status DC


Famotidine (Pepcid) 10 mg Q12HR PO ;  Start 2/12/18 at 09:00;  Stop 2/12/18 at 

10:00;  Status DC


Ferrous Sulfate (Ferrous Sulfate) 325 mg DAILY PO  Last administered on 2/20/ 18at 07:48;  Start 2/14/18 at 09:00


Glucagon (Glucagon Inj) 1 mg UNSCH  PRN OTHER HYPOGLYCEMIA-SEE COMMENTS;  Start 

2/13/18 at 10:00


Heparin Sodium (Porcine) (Heparin Inj) 5,000 units Q12HR SQ  Last administered 

on 2/14/18at 09:23;  Start 2/13/18 at 21:00;  Stop 2/17/18 at 10:15;  Status DC


Insulin Human Regular (NovoLIN R SUPPLEMENTAL SCALE) 1 ACHS SLIDING  SCALE SQ  

Last administered on 2/19/18at 16:32;  Start 2/13/18 at 12:00


Lactated Ringer's 1,000 ml @  30 mls/hr Q24H PRN IV SEE LABEL COMMENTS;  Start 2 /16/18 at 06:45;  Stop 2/19/18 at 06:44;  Status DC


Lactulose (Lactulose Liq) 30 ml DAILY  PRN PO SEVERE CONSITIPATION;  Start 2/12/ 18 at 02:45


Magnesium Hydroxide (Milk Of Magnesia Liq) 30 ml Q12H  PRN PO Mild constipation

;  Start 2/12/18 at 02:45


Magnesium Citrate (Citroma Liq) 300 ml ONCE  ONCE PO  Last administered on 2/15/

18at 17:14;  Start 2/15/18 at 18:30;  Stop 2/15/18 at 18:31;  Status DC


Midazolam HCl (Versed Inj) 2 mg ONCE  ONCE IV PUSH  Last administered on 2/12/ 18at 12:00;  Start 2/12/18 at 12:00;  Stop 2/12/18 at 12:01;  Status DC


Miscellaneous Information 1 Q361D XX ;  Start 2/12/18 at 02:45


Morphine Sulfate (Morphine Inj) 2 mg Q2H  PRN IV PUSH PAIN SCALE 6 TO 10 Last 

administered on 2/17/18at 12:22;  Start 2/12/18 at 02:45;  Stop 2/17/18 at 12:31

;  Status DC


Ondansetron HCl (Zofran Inj) 4 mg Q6H  PRN IV PUSH NAUSEA OR VOMITING Last 

administered on 2/15/18at 20:54;  Start 2/12/18 at 02:45


Oxycodone HCl (Roxicodone) 5 mg Q4H  PRN PO pain 6-10 Last administered on 2/19/ 18at 23:41;  Start 2/17/18 at 12:30


Pantoprazole Sodium (Protonix Inj) 40 mg Q12HR IV PUSH  Last administered on 2/ 13/18at 09:00;  Start 2/12/18 at 10:15;  Stop 2/13/18 at 09:42;  Status DC


Pantoprazole Sodium (Protonix) 40 mg DAILY PO  Last administered on 2/20/18at 07

:48;  Start 2/14/18 at 09:00


Pharmacy Profile Note 0 ml @ 0 mls/hr UNSCH OTHER ;  Start 2/12/18 at 02:45;  

Stop 2/16/18 at 16:05;  Status DC


Piperacillin Sod/ Tazobactam Sod 50 ml @  100 mls/hr Q6H IV  Last administered 

on 2/16/18at 08:29;  Start 2/12/18 at 04:00;  Stop 2/16/18 at 16:05;  Status DC


Potassium Chloride 20 meq/ Dextrose 1,010 ml @  100 mls/hr Q10H6M IV ;  Start 2/ 14/18 at 13:00;  Stop 2/14/18 at 13:04;  Status DC


Potassium Chloride/Dextrose 1,000 ml @  100 mls/hr Q10H IV  Last administered 

on 2/15/18at 00:33;  Start 2/14/18 at 14:00;  Stop 2/15/18 at 11:41;  Status DC


Potassium Phosphate 15 mmol/ Sodium Chloride 155 ml @  38.75 mls/ hr ONCE  ONCE 

IV  Last administered on 2/15/18at 17:14;  Start 2/15/18 at 13:00;  Stop 2/15/

18 at 16:59;  Status DC


Potassium Phosphate 30 mmol/ Sodium Chloride 260 ml @  43.333 mls/ hr ONCE  

ONCE IV  Last administered on 2/14/18at 13:43;  Start 2/14/18 at 14:00;  Stop 2/ 14/18 at 19:59;  Status DC


Potassium Bicarb/ Potassium Chloride (K-Lyte Cl  Eff) 25 meq ONCE  ONCE PO  

Last administered on 2/14/18at 13:42;  Start 2/14/18 at 13:00;  Stop 2/14/18 at 

13:05;  Status DC


Senna/Docusate Sodium (Kaylee-Colace) 1 tab BID PO  Last administered on 2/20/ 18at 07:48;  Start 2/12/18 at 09:00


Sennosides (Senokot) 17.2 mg Q12H  PRN PO Moderate constipation;  Start 2/12/18 

at 02:45


Shark Liver Oil (Preparation H Oint) 1 applic Q12HR  PRN TOPICAL hemorrhoids;  

Start 2/19/18 at 13:00


Sodium Chloride (NS Flush)  UNSCH  PRN IV FLUSH SEE PROTOCOL;  Start 2/12/18 at 

11:30


Vancomycin HCl 500 mg/Sodium Chloride 100 ml @  200 mls/hr ONCE  ONCE IV  Last 

administered on 2/12/18at 12:49;  Start 2/12/18 at 10:00;  Stop 2/12/18 at 10:29

;  Status DC


Vancomycin HCl 1000 mg/Sodium Chloride 250 ml @  250 mls/hr ONCE  ONCE IV  Last 

administered on 2/12/18at 06:57;  Start 2/12/18 at 03:00;  Stop 2/12/18 at 03:59

;  Status DC


Vancomycin HCl 1250 mg/Sodium Chloride 262.5 ml @  250 mls/hr Q24H IV  Last 

administered on 2/14/18at 12:43;  Start 2/13/18 at 13:00;  Stop 2/14/18 at 12:59

;  Status DC


Vancomycin HCl 1500 mg/Sodium Chloride 515 ml @  250 mls/hr Q18H IV  Last 

administered on 2/16/18at 00:27;  Start 2/15/18 at 06:00;  Stop 2/16/18 at 16:05

;  Status DC


Date of Insertion:  Feb 12, 2018


Line:  Central Venous Catheter


Side:  Left


Location:  Internal, Jugular





A/P


Assessment and Plan


History of pansensitive Proteus mirabilis urinary tract infection. ID consult 

appreciated.  Urine culture noted.


-off antibiotics.


- PT/ OT evaluate and treat.





Right staghorn calculus/ Nonobstructing left nephrolithiasis/ Renal masses


Status post percutaneous nephroureteral stent right-sided for right staghorn 

calculus/likely source of sepsis.


- Will need lithotripsy at some point in the future per urology.


- Maintain Ortega catheter. Remove at time of discharge per urology.


- pain control with a bowel regimen.





Acute encephalopathy 


Secondary to severe sepsis/urinary tract source. CT brain 2/12 revealed no 

acute intracranial findings. Improving.


- treat underlying condition.


- avoid sedating meds.





HTN


Blood pressure on the low side 2/19.


- Holding lisinopril and amlodipine in light of hypotension/ acute kidney 

injury.  Resume when clinically indicated.





Hyperglycemia of critical illness


Recent A1c 5.1%.  Glucose well-controlled at this time.


- insulin sliding scale and Accu-Cheks.





Acute kidney injury


S/t above.  Resolved.


- treatment as above.


- Avoid nephrotoxic drugs.


- IVFs.


- Monitor BMP.





Bilateral uterine fibroids/ Endometrial cancer


Noted on imaging.


- Outpatient follow-up with OBGYN.





Microcytic anemia/ GIB


Status post 4 units PRBCs. BRBPR noted by nursing.  GI consult appreciated.  

Colonoscopy with diverticulosis and internal hemorrhoids.


- Monitor CBC and transfuse as needed.





Thrombocytopenia/ LUE DVT


Platelet count has been steadily decreasing.  She was on heparin products.  HIT 

negative. Hematology consult appreciated. DVT noted in LUE. 


- Follow CBC and transfuse as needed.


- hold off on anticoagulation per hematology.





Hypernatremia/ hypokalemia/ hypophosphatemia


S/t decreased PO intake.


- follow BMP








PPx: SCDs


Discharge Planning


dc to SNF within the next 24-48 hrs- when thrombocytopenia improves and cleared 

by hematology.











Kranthi Martinez MD Feb 20, 2018 09:21

## 2018-02-21 VITALS
RESPIRATION RATE: 17 BRPM | OXYGEN SATURATION: 96 % | TEMPERATURE: 98.2 F | SYSTOLIC BLOOD PRESSURE: 106 MMHG | HEART RATE: 73 BPM | DIASTOLIC BLOOD PRESSURE: 68 MMHG

## 2018-02-21 VITALS
DIASTOLIC BLOOD PRESSURE: 72 MMHG | HEART RATE: 80 BPM | TEMPERATURE: 96.5 F | RESPIRATION RATE: 16 BRPM | OXYGEN SATURATION: 98 % | SYSTOLIC BLOOD PRESSURE: 111 MMHG

## 2018-02-21 VITALS
RESPIRATION RATE: 17 BRPM | HEART RATE: 81 BPM | DIASTOLIC BLOOD PRESSURE: 78 MMHG | TEMPERATURE: 97 F | SYSTOLIC BLOOD PRESSURE: 113 MMHG | OXYGEN SATURATION: 97 %

## 2018-02-21 VITALS
DIASTOLIC BLOOD PRESSURE: 73 MMHG | TEMPERATURE: 98 F | HEART RATE: 93 BPM | SYSTOLIC BLOOD PRESSURE: 109 MMHG | RESPIRATION RATE: 17 BRPM | OXYGEN SATURATION: 98 %

## 2018-02-21 VITALS
TEMPERATURE: 96.5 F | RESPIRATION RATE: 18 BRPM | SYSTOLIC BLOOD PRESSURE: 112 MMHG | DIASTOLIC BLOOD PRESSURE: 69 MMHG | HEART RATE: 78 BPM | OXYGEN SATURATION: 98 %

## 2018-02-21 VITALS
DIASTOLIC BLOOD PRESSURE: 61 MMHG | RESPIRATION RATE: 18 BRPM | TEMPERATURE: 97.7 F | HEART RATE: 81 BPM | OXYGEN SATURATION: 99 % | SYSTOLIC BLOOD PRESSURE: 106 MMHG

## 2018-02-21 LAB
B2 GLYCOPROT1 IGA SER-ACNC: (no result) SAU
BASOPHILS # BLD AUTO: 0 TH/MM3 (ref 0–0.2)
BASOPHILS NFR BLD: 0.3 % (ref 0–2)
BETA2 GLYCOPROTEIN I AB IGG: (no result) SGU
BETA2 GLYCOPROTEIN I AB IGM: (no result) SMU
CARDIOLIPIN AB IGA: (no result) APL (ref 0–11)
CARDIOLIPIN AB IGG: (no result) GPL (ref 0–14)
CARDIOLIPIN AB IGM: (no result) MPL (ref 0–12)
DRVVT 1:1 MIX: (no result)
DRVVT SCREEN: 55 SECONDS
EOSINOPHIL # BLD: 0.2 TH/MM3 (ref 0–0.4)
EOSINOPHIL NFR BLD: 1.6 % (ref 0–4)
ERYTHROCYTE [DISTWIDTH] IN BLOOD BY AUTOMATED COUNT: 19.2 % (ref 11.6–17.2)
HCT VFR BLD CALC: 35 % (ref 35–46)
HEXAGONAL PHASE CONFIRM: (no result)
HGB BLD-MCNC: 11.4 GM/DL (ref 11.6–15.3)
LYMPHOCYTES # BLD AUTO: 1 TH/MM3 (ref 1–4.8)
LYMPHOCYTES NFR BLD AUTO: 10 % (ref 9–44)
Lab: (no result)
MCH RBC QN AUTO: 27.4 PG (ref 27–34)
MCHC RBC AUTO-ENTMCNC: 32.7 % (ref 32–36)
MCV RBC AUTO: 83.8 FL (ref 80–100)
MONOCYTE #: 0.7 TH/MM3 (ref 0–0.9)
MONOCYTES NFR BLD: 6.4 % (ref 0–8)
NEUTROPHILS # BLD AUTO: 8.5 TH/MM3 (ref 1.8–7.7)
NEUTROPHILS NFR BLD AUTO: 81.7 % (ref 16–70)
PLATELET # BLD: 121 TH/MM3 (ref 150–450)
PMV BLD AUTO: 10 FL (ref 7–11)
RBC # BLD AUTO: 4.17 MIL/MM3 (ref 4–5.3)
SCREEN APTT: 37 SECONDS
WBC # BLD AUTO: 10.4 TH/MM3 (ref 4–11)

## 2018-02-21 RX ADMIN — Medication SCH ML: at 20:28

## 2018-02-21 RX ADMIN — HUMAN INSULIN SCH: 100 INJECTION, SOLUTION SUBCUTANEOUS at 11:55

## 2018-02-21 RX ADMIN — STANDARDIZED SENNA CONCENTRATE AND DOCUSATE SODIUM SCH TAB: 8.6; 5 TABLET, FILM COATED ORAL at 20:24

## 2018-02-21 RX ADMIN — CHLORHEXIDINE GLUCONATE SCH PACK: 500 CLOTH TOPICAL at 04:00

## 2018-02-21 RX ADMIN — Medication SCH ML: at 08:39

## 2018-02-21 RX ADMIN — ONDANSETRON PRN MG: 2 INJECTION, SOLUTION INTRAMUSCULAR; INTRAVENOUS at 12:03

## 2018-02-21 RX ADMIN — HUMAN INSULIN SCH: 100 INJECTION, SOLUTION SUBCUTANEOUS at 08:00

## 2018-02-21 RX ADMIN — HUMAN INSULIN SCH: 100 INJECTION, SOLUTION SUBCUTANEOUS at 17:00

## 2018-02-21 RX ADMIN — PANTOPRAZOLE SCH MG: 40 TABLET, DELAYED RELEASE ORAL at 08:39

## 2018-02-21 RX ADMIN — ATORVASTATIN CALCIUM SCH MG: 20 TABLET, FILM COATED ORAL at 20:24

## 2018-02-21 RX ADMIN — OXYCODONE HYDROCHLORIDE AND ACETAMINOPHEN SCH MG: 500 TABLET ORAL at 08:39

## 2018-02-21 RX ADMIN — FERROUS SULFATE TAB 325 MG (65 MG ELEMENTAL FE) SCH MG: 325 (65 FE) TAB at 08:39

## 2018-02-21 RX ADMIN — STANDARDIZED SENNA CONCENTRATE AND DOCUSATE SODIUM SCH TAB: 8.6; 5 TABLET, FILM COATED ORAL at 08:39

## 2018-02-21 RX ADMIN — HUMAN INSULIN SCH: 100 INJECTION, SOLUTION SUBCUTANEOUS at 20:28

## 2018-02-21 NOTE — HHI.PR
Subjective


Remarks


in no acute distress.


pain is better.


no new complaints.





Objective


Vitals





Vital Signs








  Date Time  Temp Pulse Resp B/P (MAP) Pulse Ox O2 Delivery O2 Flow Rate FiO2


 


2/21/18 04:00 96.5 78 18 112/69 (83) 98   


 


2/21/18 00:00 97.7 81 18 106/61 (76) 99   


 


2/20/18 20:00 98.7 94 18 117/69 (85) 97   


 


2/20/18 16:00 96.6 95 19 108/69 (82) 98   


 


2/20/18 12:00 98.5 78 18 94/53 (67) 95   














I/O      


 


 2/20/18 2/20/18 2/20/18 2/21/18 2/21/18 2/21/18





 07:00 15:00 23:00 07:00 15:00 23:00


 


Output Total 400 ml   300 ml  


 


Balance -400 ml   -300 ml  


 


      


 


Output Urine Total 400 ml     


 


Drainage Total    300 ml  


 


# Voids   3 1  


 


# Bowel Movements 1  0   








Result Diagram:  


2/20/18 1442                                                                   

             2/20/18 1442





Imaging





Last Impressions








Upper Extremity Ultrasound 2/18/18 0000 Signed





Impressions: 





 Service Date/Time:  Sunday, February 18, 2018 18:49 - CONCLUSION:  1. 

Occlusive 





 thrombus within the cephalic vein near the IV. 2. Remainder of the left upper 





 extremity veins including the internal jugular vein are patent.     Jose Guadalupe Soto MD 


 


Chest X-Ray 2/12/18 1124 Signed





Impressions: 





 Service Date/Time:  Monday, February 12, 2018 11:39 - CONCLUSION:  1. Left IJ 





 central line tip in the very proximal SVC. No pneumothorax. 2. Minimal 

bibasilar 





 airspace disease, likely atelectasis.     Mario Del Rosario MD 


 


Head CT 2/12/18 0046 Signed





Impressions: 





 Service Date/Time:  Monday, February 12, 2018 01:17 - CONCLUSION:  No 





 significant change has occurred.       Elie Francois MD 


 


Abdomen/Pelvis CT 2/12/18 0046 Signed





Impressions: 





 Service Date/Time:  Monday, February 12, 2018 01:19 - CONCLUSION:  Stable 





 staghorn calculus on the right and nonobstructing left renal calculi as well 

as 





 bilateral renal masses. Calcified uterine fibroids. Diverticulosis without 





 diverticulitis. There is some air in the endometrial cavity which may be 

related 





 to recent instrumentation.     Elie Francois MD 








Objective Remarks


GENERAL: This is a well-nourished, well-developed patient, in no apparent 

distress.


CARDIOVASCULAR: Regular rate and regular rhythm without murmurs, gallops, or 

rubs. 


RESPIRATORY: Clear to auscultation. Breath sounds equal bilaterally. No wheezes

, rales, or rhonchi.  


GASTROINTESTINAL: Abdomen soft, non-tender, nondistended. 


Normal, active bowel sounds


MUSCULOSKELETAL: Extremities without clubbing, cyanosis, or edema.


NEURO:  Alert & Oriented x4 to person, place, time, situation.  Moves all ext x4


Procedures


percutaneous nephroureteral stent right-sided for right staghorn calculus


Medications and IVs





Inpatient Medications


Acetaminophen (Tylenol) 650 mg Q6H  PRN PO FEVER Last administered on 2/12/18at 

18:49;  Start 2/12/18 at 02:45


Albuterol Sulfate (Albuterol Neb) 2.5 mg Q2HR NEB  PRN NEB dyspnea;  Start 2/12/ 18 at 13:45


Albuterol/ Ipratropium (Duoneb Neb) 1 ampule Q2HR NEB  PRN INH WHEEZING;  Start 

2/12/18 at 02:45;  Stop 2/12/18 at 10:00;  Status DC


Alteplase, Recombinant (Cathflo Activase Inj) 2 mg ONCE  ONCE INTRACATH ;  

Start 2/13/18 at 12:00;  Stop 2/13/18 at 12:01;  Status DC


Ascorbic Acid (Vitamin C) 500 mg DAILY PO  Last administered on 2/21/18at 08:39

;  Start 2/14/18 at 09:00


Atorvastatin Calcium (Lipitor) 20 mg HS PO  Last administered on 2/20/18at 20:24

;  Start 2/12/18 at 21:00


Bisacodyl (Dulcolax Supp) 10 mg DAILY  PRN RECTAL SEVERE CONSITIPATION;  Start 2 /12/18 at 02:45


Ceftriaxone Sodium 1000 mg/ Sodium Chloride 100 ml @  200 mls/hr ONCE  ONCE IV  

Last administered on 2/12/18at 01:31;  Start 2/12/18 at 01:30;  Stop 2/12/18 at 

01:59;  Status DC


Chlorhexidine Gluconate (Chlorhexidine 2% Cloth) 3 pack UNSCH  PRN TOP HYGIENIC 

CARE;  Start 2/12/18 at 02:45


Dextrose 1,000 ml @  50 mls/hr Q20H IV  Last administered on 2/17/18at 03:19;  

Start 2/15/18 at 11:45;  Stop 2/19/18 at 13:39;  Status DC


Dextrose (D50w (Vial) Inj) 50 ml UNSCH  PRN IV PUSH HYPOGLYCEMIA-SEE COMMENTS;  

Start 2/13/18 at 10:00


Famotidine (Pepcid Inj) 10 mg Q12HR IV PUSH ;  Start 2/12/18 at 09:00;  Stop 2/ 12/18 at 10:00;  Status DC


Famotidine (Pepcid) 10 mg Q12HR PO ;  Start 2/12/18 at 09:00;  Stop 2/12/18 at 

10:00;  Status DC


Ferrous Sulfate (Ferrous Sulfate) 325 mg DAILY PO  Last administered on 2/21/ 18at 08:39;  Start 2/14/18 at 09:00


Glucagon (Glucagon Inj) 1 mg UNSCH  PRN OTHER HYPOGLYCEMIA-SEE COMMENTS;  Start 

2/13/18 at 10:00


Heparin Sodium (Porcine) (Heparin Inj) 5,000 units Q12HR SQ  Last administered 

on 2/14/18at 09:23;  Start 2/13/18 at 21:00;  Stop 2/17/18 at 10:15;  Status DC


Insulin Human Regular (NovoLIN R SUPPLEMENTAL SCALE) 1 ACHS SLIDING  SCALE SQ  

Last administered on 2/19/18at 16:32;  Start 2/13/18 at 12:00


Lactated Ringer's 1,000 ml @  30 mls/hr Q24H PRN IV SEE LABEL COMMENTS;  Start 2 /16/18 at 06:45;  Stop 2/19/18 at 06:44;  Status DC


Lactulose (Lactulose Liq) 30 ml DAILY  PRN PO SEVERE CONSITIPATION;  Start 2/12/ 18 at 02:45


Magnesium Hydroxide (Milk Of Magnesia Liq) 30 ml Q12H  PRN PO Mild constipation

;  Start 2/12/18 at 02:45


Magnesium Citrate (Citroma Liq) 300 ml ONCE  ONCE PO  Last administered on 2/15/

18at 17:14;  Start 2/15/18 at 18:30;  Stop 2/15/18 at 18:31;  Status DC


Midazolam HCl (Versed Inj) 2 mg ONCE  ONCE IV PUSH  Last administered on 2/12/ 18at 12:00;  Start 2/12/18 at 12:00;  Stop 2/12/18 at 12:01;  Status DC


Miscellaneous Information 1 Q361D XX ;  Start 2/12/18 at 02:45


Morphine Sulfate (Morphine Inj) 2 mg Q4H  PRN IV PUSH BREAKTHROUGH PAIN;  Start 

2/20/18 at 14:45


Ondansetron HCl (Zofran Inj) 4 mg Q6H  PRN IV PUSH NAUSEA OR VOMITING Last 

administered on 2/15/18at 20:54;  Start 2/12/18 at 02:45


Oxycodone HCl (Roxicodone) 10 mg Q4H  PRN PO PAIN 6-10 Last administered on 2/21 /18at 01:07;  Start 2/20/18 at 14:45


Pantoprazole Sodium (Protonix Inj) 40 mg Q12HR IV PUSH  Last administered on 2/ 13/18at 09:00;  Start 2/12/18 at 10:15;  Stop 2/13/18 at 09:42;  Status DC


Pantoprazole Sodium (Protonix) 40 mg DAILY PO  Last administered on 2/21/18at 08

:39;  Start 2/14/18 at 09:00


Pharmacy Profile Note 0 ml @ 0 mls/hr UNSCH OTHER ;  Start 2/12/18 at 02:45;  

Stop 2/16/18 at 16:05;  Status DC


Piperacillin Sod/ Tazobactam Sod 50 ml @  100 mls/hr Q6H IV  Last administered 

on 2/16/18at 08:29;  Start 2/12/18 at 04:00;  Stop 2/16/18 at 16:05;  Status DC


Potassium Chloride 20 meq/ Dextrose 1,010 ml @  100 mls/hr Q10H6M IV ;  Start 2/ 14/18 at 13:00;  Stop 2/14/18 at 13:04;  Status DC


Potassium Chloride/Dextrose 1,000 ml @  100 mls/hr Q10H IV  Last administered 

on 2/15/18at 00:33;  Start 2/14/18 at 14:00;  Stop 2/15/18 at 11:41;  Status DC


Potassium Phosphate 15 mmol/ Sodium Chloride 155 ml @  38.75 mls/ hr ONCE  ONCE 

IV  Last administered on 2/15/18at 17:14;  Start 2/15/18 at 13:00;  Stop 2/15/

18 at 16:59;  Status DC


Potassium Phosphate 30 mmol/ Sodium Chloride 260 ml @  43.333 mls/ hr ONCE  

ONCE IV  Last administered on 2/14/18at 13:43;  Start 2/14/18 at 14:00;  Stop 2/ 14/18 at 19:59;  Status DC


Potassium Bicarb/ Potassium Chloride (K-Lyte Cl  Eff) 25 meq ONCE  ONCE PO  

Last administered on 2/14/18at 13:42;  Start 2/14/18 at 13:00;  Stop 2/14/18 at 

13:05;  Status DC


Senna/Docusate Sodium (Kaylee-Colace) 1 tab BID PO  Last administered on 2/21/ 18at 08:39;  Start 2/12/18 at 09:00


Sennosides (Senokot) 17.2 mg Q12H  PRN PO Moderate constipation;  Start 2/12/18 

at 02:45


Shark Liver Oil (Preparation H Oint) 1 applic Q12HR  PRN TOPICAL hemorrhoids;  

Start 2/19/18 at 13:00


Sodium Chloride (NS Flush)  UNSCH  PRN IV FLUSH SEE PROTOCOL;  Start 2/12/18 at 

11:30


Vancomycin HCl 500 mg/Sodium Chloride 100 ml @  200 mls/hr ONCE  ONCE IV  Last 

administered on 2/12/18at 12:49;  Start 2/12/18 at 10:00;  Stop 2/12/18 at 10:29

;  Status DC


Vancomycin HCl 1000 mg/Sodium Chloride 250 ml @  250 mls/hr ONCE  ONCE IV  Last 

administered on 2/12/18at 06:57;  Start 2/12/18 at 03:00;  Stop 2/12/18 at 03:59

;  Status DC


Vancomycin HCl 1250 mg/Sodium Chloride 262.5 ml @  250 mls/hr Q24H IV  Last 

administered on 2/14/18at 12:43;  Start 2/13/18 at 13:00;  Stop 2/14/18 at 12:59

;  Status DC


Vancomycin HCl 1500 mg/Sodium Chloride 515 ml @  250 mls/hr Q18H IV  Last 

administered on 2/16/18at 00:27;  Start 2/15/18 at 06:00;  Stop 2/16/18 at 16:05

;  Status DC


Date of Insertion:  Feb 12, 2018


Line:  Central Venous Catheter


Side:  Left


Location:  Internal, Jugular





A/P


Assessment and Plan


History of pansensitive Proteus mirabilis urinary tract infection. ID consult 

appreciated.  Urine culture noted.


-off antibiotics.


- PT/ OT evaluate and treat.





Right staghorn calculus/ Nonobstructing left nephrolithiasis/ Renal masses


Status post percutaneous nephroureteral stent right-sided for right staghorn 

calculus/likely source of sepsis.


- Will need lithotripsy at some point in the future per urology.


- pain control with a bowel regimen.





Acute encephalopathy - improved.


Secondary to severe sepsis/urinary tract source. CT brain 2/12 revealed no 

acute intracranial findings. Improving.


- avoid sedating meds.





HTN


Blood pressure on the low side 2/19.


- Holding lisinopril and amlodipine in light of hypotension/ acute kidney 

injury.  Resume when clinically indicated.





Hyperglycemia of critical illness


Recent A1c 5.1%.  Glucose well-controlled at this time.


- insulin sliding scale and Accu-Cheks.





Acute kidney injury


S/t above.  Resolved.


- treatment as above.


- Avoid nephrotoxic drugs.


- IVFs.


- Monitor BMP.





Bilateral uterine fibroids/ Endometrial cancer


Noted on imaging.


- Outpatient follow-up with OBGYN.





Microcytic anemia/ GIB


Status post 4 units PRBCs. BRBPR noted by nursing.  GI consult appreciated.  

Colonoscopy with diverticulosis and internal hemorrhoids.


- Monitor CBC and transfuse as needed.


-H/H stable.





Thrombocytopenia/ LUE DVT


  She was on heparin products.  HIT negative. thrombocytopenia improving.


Hematology consult appreciated. DVT noted in LUE. 


will start anticoagulation when ok with hematology.





Hypernatremia/ hypokalemia/ hypophosphatemia- improved.








PPx: SCDs


Discharge Planning


dc to SNF within the next 24- when cleared by hematology.


see med list.


f/u; pcp and hematology.


d/w the patient.





time spent 35 min.











Kranthi Martinez MD Feb 21, 2018 08:53

## 2018-02-22 VITALS
DIASTOLIC BLOOD PRESSURE: 69 MMHG | RESPIRATION RATE: 17 BRPM | HEART RATE: 77 BPM | SYSTOLIC BLOOD PRESSURE: 124 MMHG | TEMPERATURE: 98 F | OXYGEN SATURATION: 95 %

## 2018-02-22 VITALS
TEMPERATURE: 97.6 F | DIASTOLIC BLOOD PRESSURE: 61 MMHG | SYSTOLIC BLOOD PRESSURE: 100 MMHG | OXYGEN SATURATION: 97 % | RESPIRATION RATE: 17 BRPM | HEART RATE: 81 BPM

## 2018-02-22 VITALS
HEART RATE: 79 BPM | TEMPERATURE: 97.9 F | SYSTOLIC BLOOD PRESSURE: 109 MMHG | RESPIRATION RATE: 18 BRPM | DIASTOLIC BLOOD PRESSURE: 62 MMHG | OXYGEN SATURATION: 97 %

## 2018-02-22 VITALS
OXYGEN SATURATION: 99 % | TEMPERATURE: 97.5 F | DIASTOLIC BLOOD PRESSURE: 76 MMHG | RESPIRATION RATE: 16 BRPM | SYSTOLIC BLOOD PRESSURE: 130 MMHG | HEART RATE: 95 BPM

## 2018-02-22 RX ADMIN — HUMAN INSULIN SCH: 100 INJECTION, SOLUTION SUBCUTANEOUS at 12:00

## 2018-02-22 RX ADMIN — PANTOPRAZOLE SCH MG: 40 TABLET, DELAYED RELEASE ORAL at 07:57

## 2018-02-22 RX ADMIN — Medication SCH ML: at 07:57

## 2018-02-22 RX ADMIN — HUMAN INSULIN SCH: 100 INJECTION, SOLUTION SUBCUTANEOUS at 07:55

## 2018-02-22 RX ADMIN — FERROUS SULFATE TAB 325 MG (65 MG ELEMENTAL FE) SCH MG: 325 (65 FE) TAB at 07:56

## 2018-02-22 RX ADMIN — STANDARDIZED SENNA CONCENTRATE AND DOCUSATE SODIUM SCH TAB: 8.6; 5 TABLET, FILM COATED ORAL at 07:57

## 2018-02-22 RX ADMIN — OXYCODONE HYDROCHLORIDE AND ACETAMINOPHEN SCH MG: 500 TABLET ORAL at 07:56

## 2018-02-22 RX ADMIN — CHLORHEXIDINE GLUCONATE SCH PACK: 500 CLOTH TOPICAL at 04:00

## 2018-02-22 NOTE — HHI.DS
__________________________________________________





Discharge Summary


Admission Date


Feb 12, 2018 at 02:03


Discharge Date:  Feb 22, 2018


Admitting Diagnosis





septic shock, UTI, symptomatic anemia





(1) UTI (urinary tract infection)


ICD Code:  N39.0 - Urinary tract infection, site not specified


Diagnosis:  Principal


Status:  Acute


(2) Thrombocytopenia


ICD Code:  D69.6 - Thrombocytopenia, unspecified


Diagnosis:  Principal


Status:  Acute


Procedures


percutaneous nephroureteral stent right-sided for right staghorn calculus


Brief History - From Admission


72-year-old female presents from a nursing home for altered mental status.  The 

patient is nonverbal however his weight. As per the nursing home documentation 

patient had a temperature 101.5.  They had given her Tylenol 1 hour prior to 

coming to the emergency room.  Patient has a blood pressure of 90/50.  Given 

her nonverbal self difficult to get any history out of the patient.  She is 

accompanied by her daughters, who told me that she was just recently diagnosed 

with endometrial cancer from last Pap smear.


CBC/BMP:  


2/21/18 1050                                                                   

             2/20/18 1442





Significant Findings





Laboratory Tests








Test


  2/19/18


18:40 2/20/18


14:42 2/21/18


10:50


 


Urine Turbidity HAZY (CLEAR)   


 


Urine Protein


  30 mg/dL


(NEG-TRACE) 


  


 


 


Urine Occult Blood MOD (NEG)   


 


Urine Leukocyte Esterase LARGE (NEG)   


 


Urine RBC 35 /hpf (0-3)   


 


Urine WBC 99 /hpf (0-5)   


 


Urine Calcium Oxalate Crystals


  RARE /hpf


(NONE) 


  


 


 


Urine Bacteria


  OCC /hpf


(NONE) 


  


 


 


Urine Mucus


  MANY /lpf


(OCC) 


  


 


 


Urine Yeast (Budding) OCC (NONE)   


 


Urine Eosinophils


  RARE /HPF


(NONE SEEN) 


  


 


 


Red Cell Distribution Width


  


  20.2 %


(11.6-17.2) 19.2 %


(11.6-17.2)


 


Platelet Count


  


  97 TH/MM3


(150-450) 121 TH/MM3


(150-450)


 


Random Glucose


  


  122 MG/DL


() 


 


 


Calcium Level


  


  10.7 MG/DL


(8.5-10.1) 


 


 


Hemoglobin


  


  


  11.4 GM/DL


(11.6-15.3)


 


Neutrophils (%) (Auto)


  


  


  81.7 %


(16.0-70.0)


 


Neutrophils # (Auto)


  


  


  8.5 TH/MM3


(1.8-7.7)








Imaging





Last Impressions








Upper Extremity Ultrasound 2/18/18 0000 Signed





Impressions: 





 Service Date/Time:  Sunday, February 18, 2018 18:49 - CONCLUSION:  1. 

Occlusive 





 thrombus within the cephalic vein near the IV. 2. Remainder of the left upper 





 extremity veins including the internal jugular vein are patent.     Jose Guadalupe Soto MD 


 


Chest X-Ray 2/12/18 1124 Signed





Impressions: 





 Service Date/Time:  Monday, February 12, 2018 11:39 - CONCLUSION:  1. Left IJ 





 central line tip in the very proximal SVC. No pneumothorax. 2. Minimal 

bibasilar 





 airspace disease, likely atelectasis.     Mario Del Rosario MD 


 


Head CT 2/12/18 0046 Signed





Impressions: 





 Service Date/Time:  Monday, February 12, 2018 01:17 - CONCLUSION:  No 





 significant change has occurred.       Elie Francois MD 


 


Abdomen/Pelvis CT 2/12/18 0046 Signed





Impressions: 





 Service Date/Time:  Monday, February 12, 2018 01:19 - CONCLUSION:  Stable 





 staghorn calculus on the right and nonobstructing left renal calculi as well 

as 





 bilateral renal masses. Calcified uterine fibroids. Diverticulosis without 





 diverticulitis. There is some air in the endometrial cavity which may be 

related 





 to recent instrumentation.     Elie Francois MD 








PE at Discharge


GENERAL: This is a well-nourished, well-developed patient, in no apparent 

distress.


CARDIOVASCULAR: Regular rate and regular rhythm without murmurs, gallops, or 

rubs. 


RESPIRATORY: Clear to auscultation. Breath sounds equal bilaterally. No wheezes

, rales, or rhonchi.  


GASTROINTESTINAL: Abdomen soft, non-tender, nondistended. 


Normal, active bowel sounds


MUSCULOSKELETAL: Extremities without clubbing, cyanosis, or edema.


NEURO:  Alert & Oriented x4 to person, place, time, situation.  Moves all ext x4


Hospital Course


History of pansensitive Proteus mirabilis urinary tract infection. ID consult 

appreciated.  Urine culture noted.


-off antibiotics.


- PT/ OT evaluate and treat.





Right staghorn calculus/ Nonobstructing left nephrolithiasis/ Renal masses


Status post percutaneous nephroureteral stent right-sided for right staghorn 

calculus/likely source of sepsis.


- Will need lithotripsy at some point in the future per urology.


- pain control with a bowel regimen.





Acute encephalopathy - improved.


Secondary to severe sepsis/urinary tract source. CT brain 2/12 revealed no 

acute intracranial findings. Improving.


- avoid sedating meds.





HTN


Blood pressure on the low side 2/19.


- Holding lisinopril and amlodipine in light of hypotension/ acute kidney 

injury.  Resume when clinically indicated.





Hyperglycemia of critical illness


Recent A1c 5.1%.  Glucose well-controlled at this time.


- insulin sliding scale and Accu-Cheks.





Acute kidney injury


S/t above.  Resolved.


- treatment as above.


- Avoid nephrotoxic drugs.


- IVFs.


- Monitor BMP.





Bilateral uterine fibroids/ Endometrial cancer


Noted on imaging.


- Outpatient follow-up with OBGYN.





Microcytic anemia/ GIB


Status post 4 units PRBCs. BRBPR noted by nursing.  GI consult appreciated.  

Colonoscopy with diverticulosis and internal hemorrhoids.


- Monitor CBC and transfuse as needed.


-H/H stable.





Thrombocytopenia/ LUE DVT


  She was on heparin products.  HIT negative. thrombocytopenia improving.


Hematology consult appreciated. DVT noted in LUE. 


will start on Eliquis for four weeks- per hematology recommendations.





Hypernatremia/ hypokalemia/ hypophosphatemia- improved.








PPx: SCDs


Pt Condition on Discharge:  Fair


Discharge Disposition:  Discharge to SNF


Discharge Time:  > 30 minutes


Discharge Instructions


DIET: Follow Instructions for:  Heart Healthy Diet, Diabetic Diet


Speech Therapy-Diet Recommends:  Honey Thickened Liquids, Pureed











Kranthi Martinez MD Feb 22, 2018 09:04

## 2018-02-22 NOTE — HHI.PR
Subjective


Remarks


in no acute distress.


pain seems to be fairly controlled.


no new complaints.





Objective


Vitals





Vital Signs








  Date Time  Temp Pulse Resp B/P (MAP) Pulse Ox O2 Delivery O2 Flow Rate FiO2


 


2/22/18 08:00 98.0 77 17 124/69 (87) 95   


 


2/22/18 04:00 97.9 79 18 109/62 (78) 97   


 


2/22/18 00:00 97.6 81 17 100/61 (74) 97   


 


2/21/18 20:00 98.2 73 17 106/68 (81) 96   


 


2/21/18 16:00 97.0 81 17 113/78 (90) 97   


 


2/21/18 12:00 98.0 93 17 109/73 (85) 98   














I/O      


 


 2/21/18 2/21/18 2/21/18 2/22/18 2/22/18 2/22/18





 07:00 15:00 23:00 07:00 15:00 23:00


 


Intake Total   240 ml   


 


Output Total 300 ml  1050 ml 600 ml 500 ml 


 


Balance -300 ml  -810 ml -600 ml -500 ml 


 


      


 


Intake Oral   240 ml   


 


Output Urine Total     500 ml 


 


Drainage Total 300 ml  1050 ml 600 ml  


 


# Voids 1     


 


# Bowel Movements   1  0 








Result Diagram:  


2/21/18 1050                                                                   

             2/20/18 1442





Imaging





Last Impressions








Upper Extremity Ultrasound 2/18/18 0000 Signed





Impressions: 





 Service Date/Time:  Sunday, February 18, 2018 18:49 - CONCLUSION:  1. 

Occlusive 





 thrombus within the cephalic vein near the IV. 2. Remainder of the left upper 





 extremity veins including the internal jugular vein are patent.     Jose Guadalupe Soto MD 


 


Chest X-Ray 2/12/18 1124 Signed





Impressions: 





 Service Date/Time:  Monday, February 12, 2018 11:39 - CONCLUSION:  1. Left IJ 





 central line tip in the very proximal SVC. No pneumothorax. 2. Minimal 

bibasilar 





 airspace disease, likely atelectasis.     Mario Del Rosario MD 


 


Head CT 2/12/18 0046 Signed





Impressions: 





 Service Date/Time:  Monday, February 12, 2018 01:17 - CONCLUSION:  No 





 significant change has occurred.       Elie Francois MD 


 


Abdomen/Pelvis CT 2/12/18 0046 Signed





Impressions: 





 Service Date/Time:  Monday, February 12, 2018 01:19 - CONCLUSION:  Stable 





 staghorn calculus on the right and nonobstructing left renal calculi as well 

as 





 bilateral renal masses. Calcified uterine fibroids. Diverticulosis without 





 diverticulitis. There is some air in the endometrial cavity which may be 

related 





 to recent instrumentation.     Elie Francois MD 








Objective Remarks


GENERAL: This is a well-nourished, well-developed patient, in no apparent 

distress.


CARDIOVASCULAR: Regular rate and regular rhythm without murmurs, gallops, or 

rubs. 


RESPIRATORY: Clear to auscultation. Breath sounds equal bilaterally. No wheezes

, rales, or rhonchi.  


GASTROINTESTINAL: Abdomen soft, non-tender, nondistended. 


Normal, active bowel sounds


MUSCULOSKELETAL: Extremities without clubbing, cyanosis, or edema.


NEURO:  Alert & Oriented x4 to person, place, time, situation.  Moves all ext x4


Procedures


percutaneous nephroureteral stent right-sided for right staghorn calculus


Medications and IVs





Inpatient Medications


Acetaminophen (Tylenol) 650 mg Q6H  PRN PO FEVER Last administered on 2/12/18at 

18:49;  Start 2/12/18 at 02:45


Albuterol Sulfate (Albuterol Neb) 2.5 mg Q2HR NEB  PRN NEB dyspnea;  Start 2/12/ 18 at 13:45


Albuterol/ Ipratropium (Duoneb Neb) 1 ampule Q2HR NEB  PRN INH WHEEZING;  Start 

2/12/18 at 02:45;  Stop 2/12/18 at 10:00;  Status DC


Alteplase, Recombinant (Cathflo Activase Inj) 2 mg ONCE  ONCE INTRACATH ;  

Start 2/13/18 at 12:00;  Stop 2/13/18 at 12:01;  Status DC


Ascorbic Acid (Vitamin C) 500 mg DAILY PO  Last administered on 2/22/18at 07:56

;  Start 2/14/18 at 09:00


Atorvastatin Calcium (Lipitor) 20 mg HS PO  Last administered on 2/21/18at 20:24

;  Start 2/12/18 at 21:00


Bisacodyl (Dulcolax Supp) 10 mg DAILY  PRN RECTAL SEVERE CONSITIPATION;  Start 2 /12/18 at 02:45


Ceftriaxone Sodium 1000 mg/ Sodium Chloride 100 ml @  200 mls/hr ONCE  ONCE IV  

Last administered on 2/12/18at 01:31;  Start 2/12/18 at 01:30;  Stop 2/12/18 at 

01:59;  Status DC


Chlorhexidine Gluconate (Chlorhexidine 2% Cloth) 3 pack UNSCH  PRN TOP HYGIENIC 

CARE;  Start 2/12/18 at 02:45


Dextrose 1,000 ml @  50 mls/hr Q20H IV  Last administered on 2/17/18at 03:19;  

Start 2/15/18 at 11:45;  Stop 2/19/18 at 13:39;  Status DC


Dextrose (D50w (Vial) Inj) 50 ml UNSCH  PRN IV PUSH HYPOGLYCEMIA-SEE COMMENTS;  

Start 2/13/18 at 10:00


Famotidine (Pepcid Inj) 10 mg Q12HR IV PUSH ;  Start 2/12/18 at 09:00;  Stop 2/ 12/18 at 10:00;  Status DC


Famotidine (Pepcid) 10 mg Q12HR PO ;  Start 2/12/18 at 09:00;  Stop 2/12/18 at 

10:00;  Status DC


Ferrous Sulfate (Ferrous Sulfate) 325 mg DAILY PO  Last administered on 2/22/ 18at 07:56;  Start 2/14/18 at 09:00


Glucagon (Glucagon Inj) 1 mg UNSCH  PRN OTHER HYPOGLYCEMIA-SEE COMMENTS;  Start 

2/13/18 at 10:00


Heparin Sodium (Porcine) (Heparin Inj) 5,000 units Q12HR SQ  Last administered 

on 2/14/18at 09:23;  Start 2/13/18 at 21:00;  Stop 2/17/18 at 10:15;  Status DC


Insulin Human Regular (NovoLIN R SUPPLEMENTAL SCALE) 1 ACHS SLIDING  SCALE SQ  

Last administered on 2/19/18at 16:32;  Start 2/13/18 at 12:00


Lactated Ringer's 1,000 ml @  30 mls/hr Q24H PRN IV SEE LABEL COMMENTS;  Start 2 /16/18 at 06:45;  Stop 2/19/18 at 06:44;  Status DC


Lactulose (Lactulose Liq) 30 ml DAILY  PRN PO SEVERE CONSITIPATION;  Start 2/12/ 18 at 02:45


Magnesium Hydroxide (Milk Of Magnesia Liq) 30 ml Q12H  PRN PO Mild constipation

;  Start 2/12/18 at 02:45


Magnesium Citrate (Citroma Liq) 300 ml ONCE  ONCE PO  Last administered on 2/15/

18at 17:14;  Start 2/15/18 at 18:30;  Stop 2/15/18 at 18:31;  Status DC


Midazolam HCl (Versed Inj) 2 mg ONCE  ONCE IV PUSH  Last administered on 2/12/ 18at 12:00;  Start 2/12/18 at 12:00;  Stop 2/12/18 at 12:01;  Status DC


Miscellaneous Information 1 Q361D XX ;  Start 2/12/18 at 02:45


Morphine Sulfate (Morphine Inj) 2 mg Q4H  PRN IV PUSH BREAKTHROUGH PAIN;  Start 

2/20/18 at 14:45


Ondansetron HCl (Zofran Inj) 4 mg Q6H  PRN IV PUSH NAUSEA OR VOMITING Last 

administered on 2/21/18at 12:03;  Start 2/12/18 at 02:45


Oxycodone HCl (Roxicodone) 10 mg Q4H  PRN PO PAIN 6-10 Last administered on 2/22 /18at 06:34;  Start 2/20/18 at 14:45


Pantoprazole Sodium (Protonix Inj) 40 mg Q12HR IV PUSH  Last administered on 2/ 13/18at 09:00;  Start 2/12/18 at 10:15;  Stop 2/13/18 at 09:42;  Status DC


Pantoprazole Sodium (Protonix) 40 mg DAILY PO  Last administered on 2/22/18at 07

:57;  Start 2/14/18 at 09:00


Pharmacy Profile Note 0 ml @ 0 mls/hr UNSCH OTHER ;  Start 2/12/18 at 02:45;  

Stop 2/16/18 at 16:05;  Status DC


Piperacillin Sod/ Tazobactam Sod 50 ml @  100 mls/hr Q6H IV  Last administered 

on 2/16/18at 08:29;  Start 2/12/18 at 04:00;  Stop 2/16/18 at 16:05;  Status DC


Potassium Chloride 20 meq/ Dextrose 1,010 ml @  100 mls/hr Q10H6M IV ;  Start 2/ 14/18 at 13:00;  Stop 2/14/18 at 13:04;  Status DC


Potassium Chloride/Dextrose 1,000 ml @  100 mls/hr Q10H IV  Last administered 

on 2/15/18at 00:33;  Start 2/14/18 at 14:00;  Stop 2/15/18 at 11:41;  Status DC


Potassium Phosphate 15 mmol/ Sodium Chloride 155 ml @  38.75 mls/ hr ONCE  ONCE 

IV  Last administered on 2/15/18at 17:14;  Start 2/15/18 at 13:00;  Stop 2/15/

18 at 16:59;  Status DC


Potassium Phosphate 30 mmol/ Sodium Chloride 260 ml @  43.333 mls/ hr ONCE  

ONCE IV  Last administered on 2/14/18at 13:43;  Start 2/14/18 at 14:00;  Stop 2/ 14/18 at 19:59;  Status DC


Potassium Bicarb/ Potassium Chloride (K-Lyte Cl  Eff) 25 meq ONCE  ONCE PO  

Last administered on 2/14/18at 13:42;  Start 2/14/18 at 13:00;  Stop 2/14/18 at 

13:05;  Status DC


Senna/Docusate Sodium (Kaylee-Colace) 1 tab BID PO  Last administered on 2/22/ 18at 07:57;  Start 2/12/18 at 09:00


Sennosides (Senokot) 17.2 mg Q12H  PRN PO Moderate constipation;  Start 2/12/18 

at 02:45


Shark Liver Oil (Preparation H Oint) 1 applic Q12HR  PRN TOPICAL hemorrhoids;  

Start 2/19/18 at 13:00


Sodium Chloride (NS Flush)  UNSCH  PRN IV FLUSH SEE PROTOCOL;  Start 2/12/18 at 

11:30


Vancomycin HCl 500 mg/Sodium Chloride 100 ml @  200 mls/hr ONCE  ONCE IV  Last 

administered on 2/12/18at 12:49;  Start 2/12/18 at 10:00;  Stop 2/12/18 at 10:29

;  Status DC


Vancomycin HCl 1000 mg/Sodium Chloride 250 ml @  250 mls/hr ONCE  ONCE IV  Last 

administered on 2/12/18at 06:57;  Start 2/12/18 at 03:00;  Stop 2/12/18 at 03:59

;  Status DC


Vancomycin HCl 1250 mg/Sodium Chloride 262.5 ml @  250 mls/hr Q24H IV  Last 

administered on 2/14/18at 12:43;  Start 2/13/18 at 13:00;  Stop 2/14/18 at 12:59

;  Status DC


Vancomycin HCl 1500 mg/Sodium Chloride 515 ml @  250 mls/hr Q18H IV  Last 

administered on 2/16/18at 00:27;  Start 2/15/18 at 06:00;  Stop 2/16/18 at 16:05

;  Status DC


Date of Insertion:  Feb 12, 2018


Line:  Central Venous Catheter


Side:  Left


Location:  Internal, Jugular





A/P


Assessment and Plan


History of pansensitive Proteus mirabilis urinary tract infection. ID consult 

appreciated.  Urine culture noted.


-off antibiotics.


- PT/ OT evaluate and treat.





Right staghorn calculus/ Nonobstructing left nephrolithiasis/ Renal masses


Status post percutaneous nephroureteral stent right-sided for right staghorn 

calculus/likely source of sepsis.


- Will need lithotripsy at some point in the future per urology.


- pain control with a bowel regimen.





Acute encephalopathy - improved.


Secondary to severe sepsis/urinary tract source. CT brain 2/12 revealed no 

acute intracranial findings. Improving.


- avoid sedating meds.





HTN


Blood pressure on the low side 2/19.


- Holding lisinopril and amlodipine in light of hypotension/ acute kidney 

injury.  Resume when clinically indicated.





Hyperglycemia of critical illness


Recent A1c 5.1%.  Glucose well-controlled at this time.


- insulin sliding scale and Accu-Cheks.





Acute kidney injury


S/t above.  Resolved.


- treatment as above.


- Avoid nephrotoxic drugs.


- IVFs.


- Monitor BMP.





Bilateral uterine fibroids/ Endometrial cancer


Noted on imaging.


- Outpatient follow-up with OBGYN.





Microcytic anemia/ GIB


Status post 4 units PRBCs. BRBPR noted by nursing.  GI consult appreciated.  

Colonoscopy with diverticulosis and internal hemorrhoids.


- Monitor CBC and transfuse as needed.


-H/H stable.





Thrombocytopenia/ LUE DVT


  She was on heparin products.  HIT negative. thrombocytopenia improving.


Hematology consult appreciated. DVT noted in LUE. 


will start on Eliquis for four weeks- per hematology recommendations.





Hypernatremia/ hypokalemia/ hypophosphatemia- improved.








PPx: SCDs


Discharge Planning


dc to SNF today.


see med list.


f/u; pcp and hematology.


d/w the patient.





time spent 35 min.











Kranthi Martinez MD Feb 22, 2018 09:03

## 2018-02-22 NOTE — PD.ONC.PN
Subjective


Subjective


Remarks


late entry, patient seen at 10AM


Afebrile overnight. 


Patient resting in bed in nad. 


No complaints. 


eager to be discharged today. 


states she is going to [facility]. 


denies bleeding.





Objective


Data











  Date Time  Temp Pulse Resp B/P (MAP) Pulse Ox O2 Delivery O2 Flow Rate FiO2


 


2/22/18 08:00 98.0 77 17 124/69 (87) 95   


 


2/22/18 04:00 97.9 79 18 109/62 (78) 97   


 


2/22/18 00:00 97.6 81 17 100/61 (74) 97   


 


2/21/18 20:00 98.2 73 17 106/68 (81) 96   


 


2/21/18 16:00 97.0 81 17 113/78 (90) 97   


 


2/21/18 12:00 98.0 93 17 109/73 (85) 98   














 2/22/18 2/22/18 2/22/18





 07:00 15:00 23:00


 


Output Total 600 ml 500 ml 


 


Balance -600 ml -500 ml 








Result Diagram:  


2/21/18 1050                                                                   

             2/20/18 1442





Culture Results





Microbiology








 Date/Time


Source Procedure


Growth Status


 


 


 2/19/18 18:40


Urine Random Urine Urine Culture - Preliminary


Yeast-Id To Follow Resulted











Administered Medications








 Medications


  (Trade)  Dose


 Ordered  Sig/[place]


 Route


 PRN Reason  Start Time


 Stop Time Status Last Admin


Dose Admin


 


 Atorvastatin


 Calcium


  (Lipitor)  20 mg  HS


 PO


   2/12/18 21:00


    2/21/18 20:24


 


 


 Acetaminophen


  (Tylenol)  650 mg  Q6H  PRN


 PO


 FEVER  2/12/18 02:45


    2/12/18 18:49


 


 


 Ondansetron HCl


  (Zofran Inj)  4 mg  Q6H  PRN


 IV PUSH


 NAUSEA OR VOMITING  2/12/18 02:45


    2/21/18 12:03


 


 


 Senna/Docusate


 Sodium


  (Kaylee-Colace)  1 tab  BID


 PO


   2/12/18 09:00


    2/22/18 07:57


 


 


 Sodium Chloride


  (NS Flush)  2 ml  BID


 IV FLUSH


   2/12/18 09:00


    2/22/18 07:57


 


 


 Sodium Chloride


  (NS Flush)    DAILY


 IV FLUSH


   2/12/18 11:30


    2/14/18 09:22


 


 


 Pantoprazole


 Sodium


  (Protonix)  40 mg  DAILY


 PO


   2/14/18 09:00


    2/22/18 07:57


 


 


 Ferrous Sulfate


  (Ferrous Sulfate)  325 mg  DAILY


 PO


   2/14/18 09:00


    2/22/18 07:56


 


 


 Ascorbic Acid


  (Vitamin C)  500 mg  DAILY


 PO


   2/14/18 09:00


    2/22/18 07:56


 


 


 Insulin Human


 Regular


  (NovoLIN R


 SUPPLEMENTAL


 SCALE)  1  ACHS SLIDING  SCALE


 SQ


   2/13/18 12:00


    2/19/18 16:32


 


 


 Oxycodone HCl


  (Roxicodone)  5 mg  Q4H  PRN


 PO


 PAIN 1-5  2/17/18 12:30


    2/21/18 04:44


 


 


 Oxycodone HCl


  (Roxicodone)  10 mg  Q4H  PRN


 PO


 PAIN 6-10  2/20/18 14:45


    2/22/18 06:34


 


 


 Apixaban


  (Eliquis)  2.5 mg  BID


 PO


   2/22/18 10:00


    2/22/18 10:44


 








Objective Remarks


GENERAL: Elderly female, sitting up in bed in nad. 


SKIN: Warm and dry.


HEAD: Normocephalic.


EYES: No injection or drainage. 


NECK: Supple, trachea midline.


CARDIOVASCULAR: Regular rate and rhythm 


RESPIRATORY: Breath sounds equal bilaterally. No accessory muscle use.


GASTROINTESTINAL: Abdomen soft, non-tender, nondistended. 


EXTREMITIES: No cyanosis


NEUROLOGICAL: awake and alert, normal speech.





Assessment/Plan


Problem List:  


(1) Thrombocytopenia


ICD Codes:  D69.6 - Thrombocytopenia, unspecified


Status:  Acute


Plan:  Noted some recovery after stopping abx.


No evidence for DIC.


Dx with UE cephalic vein thrombosis, catheter associated.





2/20: patient denies BRBPR.





2/22: patient being [facility]. platelets improved yesterday to 121. 





Assessment


71y/o female initially admitted with AMS, urosepsis, s/p placement of right 

sided nephrostomy tube d/t renal calculi.  Hematology consulted for 

thrombocytopenia. 


History of endometrial cancer.Hypertension. Nephrolithiasis. Urosepsis. Chronic 

anemia.


Plan


1. recommend start DVT prophylaxis with Eliquis 2.5mg PO BID on discharge 


2. follow up with PCP upon discharge. 


3. monitor for bleeding.











Grace Ignacio Feb 22, 2018 11:01

## 2018-03-09 ENCOUNTER — HOSPITAL ENCOUNTER (EMERGENCY)
Dept: HOSPITAL 17 - NED | Age: 73
Discharge: LEFT BEFORE DISCHARGE | End: 2018-03-09
Payer: MEDICARE

## 2018-03-09 VITALS
TEMPERATURE: 98.8 F | SYSTOLIC BLOOD PRESSURE: 135 MMHG | OXYGEN SATURATION: 100 % | HEART RATE: 90 BPM | DIASTOLIC BLOOD PRESSURE: 76 MMHG | RESPIRATION RATE: 20 BRPM

## 2018-03-09 VITALS — HEIGHT: 64 IN | WEIGHT: 220.46 LBS | BODY MASS INDEX: 37.64 KG/M2

## 2018-03-09 DIAGNOSIS — R10.9: Primary | ICD-10-CM

## 2018-03-09 LAB
ALBUMIN SERPL-MCNC: 2.6 GM/DL (ref 3.4–5)
ALP SERPL-CCNC: 106 U/L (ref 45–117)
ALT SERPL-CCNC: 13 U/L (ref 10–53)
AST SERPL-CCNC: 12 U/L (ref 15–37)
BACTERIA #/AREA URNS HPF: (no result) /HPF
BASOPHILS # BLD AUTO: 0 TH/MM3 (ref 0–0.2)
BASOPHILS NFR BLD: 0.4 % (ref 0–2)
BILIRUB SERPL-MCNC: 0.3 MG/DL (ref 0.2–1)
BUN SERPL-MCNC: 7 MG/DL (ref 7–18)
CALCIUM SERPL-MCNC: 10.8 MG/DL (ref 8.5–10.1)
CHLORIDE SERPL-SCNC: 105 MEQ/L (ref 98–107)
COLOR UR: (no result)
CREAT SERPL-MCNC: 0.82 MG/DL (ref 0.5–1)
EOSINOPHIL # BLD: 0.2 TH/MM3 (ref 0–0.4)
EOSINOPHIL NFR BLD: 3.5 % (ref 0–4)
ERYTHROCYTE [DISTWIDTH] IN BLOOD BY AUTOMATED COUNT: 18.6 % (ref 11.6–17.2)
GFR SERPLBLD BASED ON 1.73 SQ M-ARVRAT: 83 ML/MIN (ref 89–?)
GLUCOSE SERPL-MCNC: 119 MG/DL (ref 74–106)
GLUCOSE UR STRIP-MCNC: (no result) MG/DL
HCO3 BLD-SCNC: 29.1 MEQ/L (ref 21–32)
HCT VFR BLD CALC: 33.6 % (ref 35–46)
HGB BLD-MCNC: 11.2 GM/DL (ref 11.6–15.3)
HGB UR QL STRIP: (no result)
KETONES UR STRIP-MCNC: (no result) MG/DL
LYMPHOCYTES # BLD AUTO: 1.3 TH/MM3 (ref 1–4.8)
LYMPHOCYTES NFR BLD AUTO: 19.2 % (ref 9–44)
MCH RBC QN AUTO: 27.2 PG (ref 27–34)
MCHC RBC AUTO-ENTMCNC: 33.2 % (ref 32–36)
MCV RBC AUTO: 81.9 FL (ref 80–100)
MONOCYTE #: 0.6 TH/MM3 (ref 0–0.9)
MONOCYTES NFR BLD: 8.2 % (ref 0–8)
NEUTROPHILS # BLD AUTO: 4.7 TH/MM3 (ref 1.8–7.7)
NEUTROPHILS NFR BLD AUTO: 68.7 % (ref 16–70)
NITRITE UR QL STRIP: (no result)
PLATELET # BLD: 246 TH/MM3 (ref 150–450)
PMV BLD AUTO: 7.4 FL (ref 7–11)
PROT SERPL-MCNC: 8.3 GM/DL (ref 6.4–8.2)
RBC # BLD AUTO: 4.11 MIL/MM3 (ref 4–5.3)
SODIUM SERPL-SCNC: 139 MEQ/L (ref 136–145)
SP GR UR STRIP: 1.02 (ref 1–1.03)
URINE LEUKOCYTE ESTERASE: (no result)
WBC # BLD AUTO: 6.8 TH/MM3 (ref 4–11)
WHITE BLOOD CELL CLUMPS: (no result)

## 2018-03-09 PROCEDURE — 81001 URINALYSIS AUTO W/SCOPE: CPT

## 2018-03-09 PROCEDURE — 85025 COMPLETE CBC W/AUTO DIFF WBC: CPT

## 2018-03-09 PROCEDURE — 99281 EMR DPT VST MAYX REQ PHY/QHP: CPT

## 2018-03-09 PROCEDURE — 87086 URINE CULTURE/COLONY COUNT: CPT

## 2018-03-09 PROCEDURE — 80053 COMPREHEN METABOLIC PANEL: CPT

## 2018-03-18 ENCOUNTER — HOSPITAL ENCOUNTER (EMERGENCY)
Dept: HOSPITAL 17 - NEPE | Age: 73
LOS: 1 days | Discharge: NURSING, REHAB OR LONG-TERM CARE | End: 2018-03-19
Payer: MEDICARE

## 2018-03-18 VITALS — HEIGHT: 67 IN | BODY MASS INDEX: 25.61 KG/M2 | WEIGHT: 163.14 LBS

## 2018-03-18 VITALS
SYSTOLIC BLOOD PRESSURE: 135 MMHG | RESPIRATION RATE: 20 BRPM | OXYGEN SATURATION: 98 % | DIASTOLIC BLOOD PRESSURE: 70 MMHG | HEART RATE: 89 BPM | TEMPERATURE: 98.8 F

## 2018-03-18 VITALS
OXYGEN SATURATION: 98 % | SYSTOLIC BLOOD PRESSURE: 124 MMHG | TEMPERATURE: 98 F | RESPIRATION RATE: 20 BRPM | DIASTOLIC BLOOD PRESSURE: 77 MMHG | HEART RATE: 72 BPM

## 2018-03-18 DIAGNOSIS — Z46.82: ICD-10-CM

## 2018-03-18 DIAGNOSIS — I10: ICD-10-CM

## 2018-03-18 DIAGNOSIS — E27.9: ICD-10-CM

## 2018-03-18 DIAGNOSIS — M54.5: ICD-10-CM

## 2018-03-18 DIAGNOSIS — N28.1: ICD-10-CM

## 2018-03-18 DIAGNOSIS — N32.3: ICD-10-CM

## 2018-03-18 DIAGNOSIS — C53.0: ICD-10-CM

## 2018-03-18 DIAGNOSIS — C52: ICD-10-CM

## 2018-03-18 DIAGNOSIS — R94.31: ICD-10-CM

## 2018-03-18 DIAGNOSIS — T83.022A: ICD-10-CM

## 2018-03-18 DIAGNOSIS — R11.0: ICD-10-CM

## 2018-03-18 DIAGNOSIS — D25.9: ICD-10-CM

## 2018-03-18 DIAGNOSIS — D69.6: ICD-10-CM

## 2018-03-18 DIAGNOSIS — N20.0: Primary | ICD-10-CM

## 2018-03-18 DIAGNOSIS — B37.9: ICD-10-CM

## 2018-03-18 PROCEDURE — 87086 URINE CULTURE/COLONY COUNT: CPT

## 2018-03-18 PROCEDURE — 87106 FUNGI IDENTIFICATION YEAST: CPT

## 2018-03-18 PROCEDURE — 57100 BIOPSY VAGINAL MUCOSA SIMPLE: CPT

## 2018-03-18 PROCEDURE — 50433 PLMT NEPHROURETERAL CATHETER: CPT

## 2018-03-18 PROCEDURE — 80053 COMPREHEN METABOLIC PANEL: CPT

## 2018-03-18 PROCEDURE — 85610 PROTHROMBIN TIME: CPT

## 2018-03-18 PROCEDURE — 88305 TISSUE EXAM BY PATHOLOGIST: CPT

## 2018-03-18 PROCEDURE — 86850 RBC ANTIBODY SCREEN: CPT

## 2018-03-18 PROCEDURE — 52000 CYSTOURETHROSCOPY: CPT

## 2018-03-18 PROCEDURE — C1894 INTRO/SHEATH, NON-LASER: HCPCS

## 2018-03-18 PROCEDURE — 99285 EMERGENCY DEPT VISIT HI MDM: CPT

## 2018-03-18 PROCEDURE — 85730 THROMBOPLASTIN TIME PARTIAL: CPT

## 2018-03-18 PROCEDURE — 85025 COMPLETE CBC W/AUTO DIFF WBC: CPT

## 2018-03-18 PROCEDURE — 88331 PATH CONSLTJ SURG 1 BLK 1SPC: CPT

## 2018-03-18 PROCEDURE — 86900 BLOOD TYPING SEROLOGIC ABO: CPT

## 2018-03-18 PROCEDURE — 81001 URINALYSIS AUTO W/SCOPE: CPT

## 2018-03-18 PROCEDURE — 86901 BLOOD TYPING SEROLOGIC RH(D): CPT

## 2018-03-18 PROCEDURE — 99152 MOD SED SAME PHYS/QHP 5/>YRS: CPT

## 2018-03-18 PROCEDURE — 93005 ELECTROCARDIOGRAM TRACING: CPT

## 2018-03-18 PROCEDURE — C1769 GUIDE WIRE: HCPCS

## 2018-03-18 PROCEDURE — 83690 ASSAY OF LIPASE: CPT

## 2018-03-18 PROCEDURE — 00940 ANES VAGINAL PX NOS: CPT

## 2018-03-18 PROCEDURE — C1887 CATHETER, GUIDING: HCPCS

## 2018-03-18 PROCEDURE — 88307 TISSUE EXAM BY PATHOLOGIST: CPT

## 2018-03-18 PROCEDURE — 57500 BIOPSY OF CERVIX: CPT

## 2018-03-18 PROCEDURE — 99153 MOD SED SAME PHYS/QHP EA: CPT

## 2018-03-18 PROCEDURE — C1877 STENT, NON-COAT/COV W/O DEL: HCPCS

## 2018-03-18 PROCEDURE — 74177 CT ABD & PELVIS W/CONTRAST: CPT

## 2018-03-18 PROCEDURE — 96374 THER/PROPH/DIAG INJ IV PUSH: CPT

## 2018-03-18 PROCEDURE — 80048 BASIC METABOLIC PNL TOTAL CA: CPT

## 2018-03-19 VITALS
SYSTOLIC BLOOD PRESSURE: 129 MMHG | OXYGEN SATURATION: 100 % | HEART RATE: 87 BPM | DIASTOLIC BLOOD PRESSURE: 78 MMHG | RESPIRATION RATE: 18 BRPM

## 2018-03-19 VITALS
OXYGEN SATURATION: 96 % | RESPIRATION RATE: 16 BRPM | DIASTOLIC BLOOD PRESSURE: 83 MMHG | SYSTOLIC BLOOD PRESSURE: 127 MMHG | HEART RATE: 89 BPM

## 2018-03-19 VITALS
TEMPERATURE: 99.1 F | SYSTOLIC BLOOD PRESSURE: 136 MMHG | RESPIRATION RATE: 16 BRPM | DIASTOLIC BLOOD PRESSURE: 94 MMHG | HEART RATE: 93 BPM | OXYGEN SATURATION: 95 %

## 2018-03-19 VITALS
TEMPERATURE: 97.8 F | RESPIRATION RATE: 18 BRPM | SYSTOLIC BLOOD PRESSURE: 134 MMHG | DIASTOLIC BLOOD PRESSURE: 75 MMHG | OXYGEN SATURATION: 100 % | HEART RATE: 91 BPM

## 2018-03-19 VITALS
DIASTOLIC BLOOD PRESSURE: 88 MMHG | OXYGEN SATURATION: 96 % | RESPIRATION RATE: 16 BRPM | HEART RATE: 96 BPM | SYSTOLIC BLOOD PRESSURE: 133 MMHG

## 2018-03-19 VITALS — RESPIRATION RATE: 20 BRPM

## 2018-03-19 VITALS
SYSTOLIC BLOOD PRESSURE: 155 MMHG | HEART RATE: 94 BPM | RESPIRATION RATE: 16 BRPM | OXYGEN SATURATION: 94 % | DIASTOLIC BLOOD PRESSURE: 97 MMHG

## 2018-03-19 LAB
ALBUMIN SERPL-MCNC: 2.5 GM/DL (ref 3.4–5)
ALP SERPL-CCNC: 91 U/L (ref 45–117)
ALT SERPL-CCNC: 16 U/L (ref 10–53)
AST SERPL-CCNC: 10 U/L (ref 15–37)
BASOPHILS # BLD AUTO: 0 TH/MM3 (ref 0–0.2)
BASOPHILS NFR BLD: 0.4 % (ref 0–2)
BILIRUB SERPL-MCNC: 0.3 MG/DL (ref 0.2–1)
BUN SERPL-MCNC: 6 MG/DL (ref 7–18)
BUN SERPL-MCNC: 7 MG/DL (ref 7–18)
CALCIUM SERPL-MCNC: 10.7 MG/DL (ref 8.5–10.1)
CALCIUM SERPL-MCNC: 10.8 MG/DL (ref 8.5–10.1)
CHLORIDE SERPL-SCNC: 105 MEQ/L (ref 98–107)
CHLORIDE SERPL-SCNC: 106 MEQ/L (ref 98–107)
COLOR UR: YELLOW
CREAT SERPL-MCNC: 0.7 MG/DL (ref 0.5–1)
CREAT SERPL-MCNC: 0.87 MG/DL (ref 0.5–1)
EOSINOPHIL # BLD: 0.1 TH/MM3 (ref 0–0.4)
EOSINOPHIL NFR BLD: 2.2 % (ref 0–4)
ERYTHROCYTE [DISTWIDTH] IN BLOOD BY AUTOMATED COUNT: 18.2 % (ref 11.6–17.2)
GFR SERPLBLD BASED ON 1.73 SQ M-ARVRAT: 77 ML/MIN (ref 89–?)
GFR SERPLBLD BASED ON 1.73 SQ M-ARVRAT: 99 ML/MIN (ref 89–?)
GLUCOSE SERPL-MCNC: 114 MG/DL (ref 74–106)
GLUCOSE SERPL-MCNC: 93 MG/DL (ref 74–106)
GLUCOSE UR STRIP-MCNC: (no result) MG/DL
HCO3 BLD-SCNC: 26.7 MEQ/L (ref 21–32)
HCO3 BLD-SCNC: 29.3 MEQ/L (ref 21–32)
HCT VFR BLD CALC: 31.2 % (ref 35–46)
HGB BLD-MCNC: 10.1 GM/DL (ref 11.6–15.3)
HGB UR QL STRIP: (no result)
INR PPP: 1.1 RATIO
INR PPP: 1.1 RATIO
KETONES UR STRIP-MCNC: (no result) MG/DL
LYMPHOCYTES # BLD AUTO: 1.2 TH/MM3 (ref 1–4.8)
LYMPHOCYTES NFR BLD AUTO: 19.8 % (ref 9–44)
MCH RBC QN AUTO: 25.8 PG (ref 27–34)
MCHC RBC AUTO-ENTMCNC: 32.2 % (ref 32–36)
MCV RBC AUTO: 80.1 FL (ref 80–100)
MONOCYTE #: 0.6 TH/MM3 (ref 0–0.9)
MONOCYTES NFR BLD: 9.5 % (ref 0–8)
MUCOUS THREADS #/AREA URNS LPF: (no result) /LPF
NEUTROPHILS # BLD AUTO: 4.1 TH/MM3 (ref 1.8–7.7)
NEUTROPHILS NFR BLD AUTO: 68.1 % (ref 16–70)
NITRITE UR QL STRIP: (no result)
PLATELET # BLD: 237 TH/MM3 (ref 150–450)
PMV BLD AUTO: 7.2 FL (ref 7–11)
PROT SERPL-MCNC: 7.9 GM/DL (ref 6.4–8.2)
PROTHROMBIN TIME: 10.7 SEC (ref 9.8–11.6)
PROTHROMBIN TIME: 10.9 SEC (ref 9.8–11.6)
RBC # BLD AUTO: 3.9 MIL/MM3 (ref 4–5.3)
RENAL EPI CELLS #/AREA URNS HPF: <1 /HPF
SODIUM SERPL-SCNC: 140 MEQ/L (ref 136–145)
SODIUM SERPL-SCNC: 141 MEQ/L (ref 136–145)
SP GR UR STRIP: 1.01 (ref 1–1.03)
SQUAMOUS #/AREA URNS HPF: 3 /HPF (ref 0–5)
URINE LEUKOCYTE ESTERASE: (no result)
WBC # BLD AUTO: 6 TH/MM3 (ref 4–11)
WHITE BLOOD CELL CLUMPS: (no result)

## 2018-03-19 NOTE — MP
cc:

Ludivina Cruz MD, Steven Schneider, Julie

****

 

 

DATE OF OPERATION:

03/19/2018

 

PREOPERATIVE DIAGNOSIS:

Postmenopausal bleeding, cervical mass and Pap smear suggestive of 

adenocarcinoma.

 

POSTOPERATIVE DIAGNOSIS:

Clinical stage IIB endocervical adenocarcinoma.

 

PROCEDURE PERFORMED:

Exam under anesthesia, cervix and upper vaginal biopsies, cystoscopy.

 

SURGEON:

Ludivina Cruz MD

 

FIRST ASSISTANT:

[place] first assistant.

 

ANESTHESIA:

Laryngeal mask.

 

ESTIMATED BLOOD LOSS:

20 mL

 

HISTORY:

A 72-year-old female who had postmenopausal bleeding of uncertain 

duration.  She resides in a nursing home.  She had a Pap smear that 

was atypical glandular cells, suspicious for malignancy and exam by 

her referring GYN doctor was the clinical impression of a mass that 

was suspicious for cervical cancer.

 

She was seen in our office and counseled, presents now for further 

evaluation with exam under anesthesia and to try to clarify the origin

and extent of this problem.

 

FINDINGS:

On exam under anesthesia, there are no appreciably enlarged inguinal 

lymph nodes.  External genitalia without mass or lesion.  On speculum 

exam, the cervix has a large tumor that is replacing the majority of 

the cervix, most prominent on the right side.  It extends across the 

cervix, seems to be coming from the endocervical canal, extends to the

upper vagina and along the upper 1/3 of the vagina against the left 

vaginal wall.  Furthermore, there is infiltration into the parametria 

bilaterally.  I cannot feel obvious tumor extension to the pelvic 

sidewalls.  The width of the tumor including the extension to the 

vagina is between 5 and 6 cm.

 

On cystoscopy, the bladder mucosa appears normal.  There is good 

efflux of urine through both ureteral ostia.  The left one is 

contracted, but urine comes out without compromise.  The right one is 

somewhat dilated, exophytic but also shows good efflux of urine.  

There is no mass, polyp, or tumor.

 

Proctosigmoidoscope is not possible.  She is significantly obstipation

with impacted stool in the rectum.

 

Frozen section biopsy shows this to be an adenocarcinoma.  Clinically,

these findings are most consistent with a stage IIB endocervical 

adenocarcinoma.

 

DESCRIPTION OF PROCEDURE:

She was taken to the operating room and placed in dorsal lithotomy 

position after laryngeal mask anesthesia was administered.  Timeout 

was undertaken.  She was identified by site recognition and hospital 

ID bracelet, and the procedure was reviewed and confirmed.

 

Exam under anesthesia was performed with findings as described above. 

She was prepped and draped in sterile fashion.  Speculum exam 

performed.  Biopsies were obtained from the cervix, sent for frozen 

section analysis.  Additional biopsies were taken from the cervix and 

the left upper vagina, sent for permanent histopathologic analysis, 

and a Ray-Vickie sponge was placed in the vagina for pressure.

 

Cystoscopy was performed using a 30-degree scope with findings as 

described above.  Rectovaginal exam helped facilitate findings as 

described above but also made it clear that a proctosigmoidoscopy 

would be of no benefit, but there was no mass palpable within the 

rectum.

 

Change of sterile gloves was undertaken.  The Ray-Vickie sponge was 

removed from the vagina.  The biopsy sites were rendered hemostatic 

with topical Monsel's solution.  There was complete hemostasis.

 

There were no remaining foreign objects in the vagina.  Preliminary 

and final counts were correct.  She was returned to dorsal supine 

position and was pending reversal of anesthesia, when I left the 

operating room to proceed her to the postanesthesia care unit.

 

DISPOSITION:

It should be noted that even though she was scheduled for this 

procedure this morning, she actually presented to the emergency room 

late last night because she had a percutaneous left nephrostomy tube 

in place because of a large obstructing kidney stone.  This 

nephrostomy tube became dislodged and so, her caregivers at the 

nursing home sent her to the emergency room.

 

The plan after recovery room, after discussion with Dr. Rob Mazariegos, 

who had previously been involved in her care, he had placed a consult 

request for interventional radiology to replace the percutaneous 

nephrostomy tube and it is anticipated that after completion of that, 

she will be able to be returned to her nursing home.  Barring 

something unforeseen, she would not require hospital admission.

 

 

__________________________________

MD ELDA Jessica/MARK

D: 03/19/2018, 02:29 PM

T: 03/19/2018, 03:00 PM

Visit #: [id number]

Job #: [id number]

FANNY

## 2018-03-19 NOTE — EKG
Date Performed: 03/19/2018       Time Performed: 08:09:29

 

PTAGE:      72 years

 

EKG:      Sinus rhythm 

 

 LOW QRS VOLTAGE IN EXTREMITY LEADS Since previous tracing, no significant change noted BORDERLINE EC
G

 

PREVIOUS TRACING       : 02/12/2018 00.19

 

DOCTOR:   Luann Clark  Interpretating Date/Time  03/19/2018 21:20:19

## 2018-03-19 NOTE — RADRPT
EXAM DATE/TIME:  03/19/2018 01:02 

 

[facility] COMPARISON:     

CT ABDOMEN & PELVIS W CONTRAST, January 20, 2018, 15:46.

 

 

INDICATIONS :     

Abdominal pain.

                      

 

IV CONTRAST:     

96 cc Omnipaque 350 (iohexol) IV 

 

 

ORAL CONTRAST:      

No oral contrast ingested.

                      

 

RADIATION DOSE:     

11.23 CTDIvol (mGy) 

 

 

MEDICAL HISTORY :     

Hypertension. Cardiovascular disease UTI

 

SURGICAL HISTORY :      

Tubal ligation. Right nephrostomy tube

 

ENCOUNTER:      

Initial

 

ACUITY:      

1 day

 

PAIN SCALE:      

6/10

 

LOCATION:         

abdomen

 

TECHNIQUE:     

Volumetric scanning of the abdomen and pelvis was performed.  Using automated exposure control and ad
justment of the mA and/or kV according to patient size, radiation dose was kept as low as reasonably 
achievable to obtain optimal diagnostic quality images.  DICOM format image data is available electro
nically for review and comparison.  

 

FINDINGS:     

 

LOWER LUNGS:     

The visualized lower lungs are clear.

 

LIVER:     

Homogeneous density without solid lesion.  Multiple subcentimeter cysts, similar to prior CT.  There 
is no dilation of the biliary tree.  No calcified gallstones.

 

SPLEEN:     

Normal size without lesion.

 

PANCREAS:     

Within normal limits.

 

KIDNEYS:     

Large staghorn calculus in the right kidney enveloping upper, mid, and lower pole calyces and extendi
ng into the renal pelvis, similar in configuration to prior CT.  Interval placement of a percutaneous
 nephrostomy.  Multiple left renal cysts are all hypodense; the intermediate density cyst in the midp
ole of the left kidney on prior CT, and is decreased in density, 23 Hounsfield units (prior density w
as 45 Hounsfield units).  No evidence hydronephrosis on the left side.

 

ADRENAL GLANDS:     

Globular configuration to the right adrenal, measuring up to 2.7 cm and with contrast stable in confi
guration from prior.  Nodule in the crux of the left adrenal gland measures 1.8 cm and has 70 Hounsfi
eld units.

 

VASCULAR:     

There is no aortic aneurysm.

 

BOWEL/MESENTERY:     

The stomach, small bowel, and colon demonstrate no acute abnormality.  There is no free intraperitone
al air or fluid.

 

ABDOMINAL WALL:     

Within normal limits.

 

RETROPERITONEUM:     

There is no lymphadenopathy. 

 

BLADDER:     

Smooth margins.  Moderate size diverticulum posterolateral left side measures 5 cm and contains a 2 m
m punctate calcification which is a new finding from prior CT.

 

REPRODUCTIVE:     

Markedly enlarged uterus with numerous calcified fibroids demonstrating differing patterns of calcifi
cation; the overall configuration to the enlarged uterus is stable from prior.

 

INGUINAL:     

There is no lymphadenopathy or hernia. 

 

MUSCULOSKELETAL:     

Stable advanced hypertrophic changes in posterior elements of the L5-S1 level, stable from prior.

 

CONCLUSION:     

1. No acute findings.

2. Bilateral adrenal masses, lobular on the right and oval in the left.

3. Multiple hepatic and renal cysts and numerous calcified uterine fibroids causing enlargement of th
e uterus, stable configuration to prior examination.

4. Interval placement of right nephrostomy with large right staghorn calculus.

5. There is a new 2 mm calcified stone in the left posterolateral bladder diverticulum.

 

 

 

 Yair San MD on March 19, 2018 at 1:54           

Board Certified Radiologist.

 This report was verified electronically.

## 2018-03-19 NOTE — PD
HPI


Chief Complaint:  Medical Device Problem


Time Seen by Provider:  23:46


Travel History


International Travel<30 days:  No


Contact w/Intl Traveler<30days:  No


Traveled to known affect area:  No





History of Present Illness


HPI


The patient is a 72 year old female who presents to the [facility] 

emergency department with a history of having her right sided nephrostomy tube 

partially dislodged.  The patient unfortunately is a poor historian which thus 

makes it difficult to obtain detailed history.  She is unsure why her 

nephrostomy tube was placed.  She reports that it is been in for approximately 

3 weeks.  She is currently residing at [facility].  She 

reports that the nephrostomy tube was partially pulled out approximately 2 

weeks ago.  She is unsure why she was sent from the [facility] today 

regarding this.  She reports having nausea without vomiting.  She reports 

having back pain, however she has difficulty localizing where the pain is 

located other than in the low back.  She is noted to have urine in her 

nephrostomy bag.  She denies having any known fevers, recent cough or congestion

, neck pain, chest pain, shortness of breath, abdominal pain, diarrhea, or 

neurologic symptoms.





[facility]


Past Medical History


*** Narrative Medical


The patient's past medical history is significant for nephrostomy tube 

placement on the right side which according to the record was placed related to 

a large obstructing ureteral stone, history of thrombocytopenia, history of 

acid reflux, history of hyperlipidemia, hypertension, history of postmenopausal 

bleeding which it was recommended by Dr. Cruz that she have an exam under 

anesthesia for cystoscopy, proctoscopy, cervical biopsy and possible D&C as she 

had a Pap smear done recently that showed atypical glandular cells suspicious 

for adenocarcinoma.


Arthritis:  No


Heart Rhythm Problems:  No


Cancer:  No


Cardiovascular Problems:  Yes


High Cholesterol:  Yes


Chest Pain:  No


Congestive Heart Failure:  No


Diabetes:  No


Diminished Hearing:  No


Endocrine:  No


Genitourinary:  Yes


Hypertension:  Yes


Immune Disorder:  No


Kidney Stones:  No


Musculoskeletal:  Yes


Neurologic:  No


Psychiatric:  No


Reproductive:  No


Respiratory:  No


Renal Failure:  No


Thyroid Disease:  No


Menopausal:  Yes


Tubal Ligation:  Yes





Past Surgical History


*** Narrative Surgical


The patient's past surgical history is significant for a bilateral tubal 

ligation.


Abdominal Surgery:  No


Cardiac Surgery:  No


Endocrine Surgery:  No


Genitourinary Surgery:  No


Gynecologic Surgery:  Yes (tubes tied)


Oral Surgery:  No


Thoracic Surgery:  No


Other Surgery:  Yes





Social History


Alcohol Use:  No


Tobacco Use:  No


Substance Use:  No





Allergies-Medications


(Allergen,Severity, Reaction):  


Coded Allergies:  


     No Known Allergies (Unverified  Allergy, Unknown, 3/18/18)


Reported Meds & Prescriptions





Reported Meds & Active Scripts


Active


Eliquis (Apixaban) 2.5 Mg Tab 2.5 Mg PO BID 28 Days


Bourbon (Hydrocodone-Acetaminophen) 5 Mg-325 Mg Tab 1 Tab PO Q4H PRN


Gnp Senna Plus 8.6-50 mg (Sennosides-Docusate Sodium) 8.6 Mg-50 Mg Tab 1 Tab PO 

BID


Reported


Zofran Odt (Ondansetron Odt) 4 Mg Tab 4 Mg SL Q8HR PRN


Atorvastatin (Atorvastatin Calcium) 20 Mg Tab 20 Mg PO HS








Review of Systems


Except as stated in HPI:  all other systems reviewed are Neg


General / Constitutional:  No: Fever


Eyes:  No: Visual changes


HENT:  No: Headaches


Cardiovascular:  No: Chest Pain or Discomfort


Respiratory:  No: Shortness of Breath


Gastrointestinal:  Positive: Nausea, No: Vomiting, Diarrhea, Abdominal Pain


Genitourinary:  No: Dysuria


Musculoskeletal:  Positive: Myalgias, No: Pain


Skin:  No Rash


Neurologic:  No: Weakness, Focal Abnormalities, Change in Mentation, Slurred 

Speech, Sensory Disturbance


Psychiatric:  No: Depression


Endocrine:  No: Polydipsia


Hematologic/Lymphatic:  No: Easy Bruising





Physical Exam


Narrative


General: 


The patient is a well-developed well-nourished female in no acute distress. 





Head and Neck exam: 


Head is normocephalic atraumatic. 


Eyes: EOMI, pupils are equal round and reactive to light. 


Nose: Midline septum with pink mucous membranes 


Mouth: Dentition unremarkable. Moist mucus membranes. Posterior oropharynx is 

not erythematous. No tonsillar hypertrophy. Uvula midline. Airway patent. 


Neck: No palpable lymphadenopathy. No nuchal rigidity. No thyromegaly. 





Cardiovascular: 


Regular rate and rhythm without murmurs, gallops, or rubs. 





Lungs: 


Clear to auscultation bilaterally. No wheezes, rhonchi, or rales.


 


Abdomen:


Soft, with reported tenderness on palpation overlying the suprapubic area and 

left lower quadrant of the abdomen, no other tenderness on palpation of the 

other quadrants of the abdomen. No guarding, rebound, or rigidity.  Normal 

bowel sounds are audible.  No tenderness on palpation of McBurney's point.  

Negative Turpin sign





Extremities: 


No clubbing, cyanosis, or edema. 2+ pulses in all 4 extremities.  No calf 

tenderness on palpation peer





Back: 


No spinous process tenderness to palpation. No costovertebral angle tenderness 

to palpation.  The patient is noted to have a nephrostomy tube in place in the 

right side.  The patient has no surrounding erythema, drainage.  The patient's 

tube appears to be inserted with urine and her nephrostomy tube bag.  This will 

be emptied and reassessed for continued drainage.





Neurologic Exam: Grossly nonfocal.





Skin Exam: No rash noted. Intact skin that is warm and dry.





Data


Data


Last Documented VS





Vital Signs








  Date Time  Temp Pulse Resp B/P (MAP) Pulse Ox O2 Delivery O2 Flow Rate FiO2


 


3/19/18 14:40 97.8 91 18 134/75 (94) 100 Room Air  


 


3/19/18 10:00       2 








Orders





 Orders


Complete Blood Count With Diff (3/18/18 23:48)


Comprehensive Metabolic Panel (3/18/18 23:48)


Prothrombin Time / Inr (Pt) (3/18/18 23:48)


Act Partial Throm Time (Ptt) (3/18/18 23:48)


Lipase (3/18/18 23:48)


Urinalysis - C+S If Indicated (3/18/18 23:48)


Ct Abd/Pel W Iv Contrast(Rout) (3/18/18 23:48)


Iv Access Insert/Monitor (3/18/18 23:48)


Ecg Monitoring (3/18/18 23:48)


Oximetry (3/18/18 23:48)


Urine Culture (3/19/18 00:00)


Iohexol 350 Inj (Omnipaque 350 Inj) (3/19/18 01:17)


Electrocardiogram (3/19/18 )


Basic Metabolic Panel (Bmp) (3/19/18 07:51)


Type And Screen (3/19/18 07:51)


Prothrombin Time / Inr (Pt) (3/19/18 07:51)


Lactated Ringer's 1000 Ml Inj (Lr 1000 M (3/19/18 09:00)


Sodium Chlorid 0.9% 500 Ml Inj (Ns 500 M (3/19/18 09:00)


Metoprolol Tartrate (Lopressor) (3/19/18 09:00)


Povidone Iod 5% Antisepsis Kit (Betadine (3/19/18 09:00)


Chlorhexidine 2% Cloth (Chlorhexidine 2% (3/19/18 09:00)


Fentanyl Inj (Fentanyl Inj) (3/19/18 09:40)


Misc Nursing Information (3/19/18 09:32)


Ketorolac Inj (Toradol Inj) (3/19/18 11:30)


Class Iv Pacu Ea 30 Min (3/19/18 )


General/Pacu (3/19/18 )


Post Anesthesia Oxygen (3/19/18 )


Levofloxacin 500 Mg Premix Inj (Levaquin (3/19/18 11:45)


Fentanyl Inj (Fentanyl Inj) (3/19/18 11:47)


Fentanyl Inj (Fentanyl Inj) (3/19/18 11:47)


Midazolam Inj (Versed Inj) (3/19/18 11:48)


Vital Signs (Adult) Q15MX2,Q30MX2 (3/19/18 12:36)


Intake + Output SANDRA.QSHIFT (3/19/18 12:36)


^ Drain (3/19/18 12:36)


Notify Radiology (3/19/18 12:36)


Discharge Instructions (3/19/18 12:36)


Nephroureteral Catheter (3/19/18 )


Percutaneous Ant. Pyelogram (3/19/18 )


Iohexol 350 Inj (Omnipaque 350 Inj) (3/19/18 12:20)


Us Guided Needle Placement (3/19/18 )


Attending Discharge Order (3/19/18 )





Labs





Laboratory Tests








Test


  3/19/18


00:00 3/19/18


07:30


 


White Blood Count 6.0 TH/MM3  


 


Red Blood Count 3.90 MIL/MM3  


 


Hemoglobin 10.1 GM/DL  


 


Hematocrit 31.2 %  


 


Mean Corpuscular Volume 80.1 FL  


 


Mean Corpuscular Hemoglobin 25.8 PG  


 


Mean Corpuscular Hemoglobin


Concent 32.2 % 


  


 


 


Red Cell Distribution Width 18.2 %  


 


Platelet Count 237 TH/MM3  


 


Mean Platelet Volume 7.2 FL  


 


Neutrophils (%) (Auto) 68.1 %  


 


Lymphocytes (%) (Auto) 19.8 %  


 


Monocytes (%) (Auto) 9.5 %  


 


Eosinophils (%) (Auto) 2.2 %  


 


Basophils (%) (Auto) 0.4 %  


 


Neutrophils # (Auto) 4.1 TH/MM3  


 


Lymphocytes # (Auto) 1.2 TH/MM3  


 


Monocytes # (Auto) 0.6 TH/MM3  


 


Eosinophils # (Auto) 0.1 TH/MM3  


 


Basophils # (Auto) 0.0 TH/MM3  


 


CBC Comment DIFF FINAL  


 


Differential Comment   


 


Prothrombin Time 10.7 SEC  10.9 SEC 


 


Prothromb Time International


Ratio 1.1 RATIO 


  1.1 RATIO 


 


 


Activated Partial


Thromboplast Time 28.6 SEC 


  


 


 


Urine Color YELLOW  


 


Urine Turbidity HAZY  


 


Urine pH 7.0  


 


Urine Specific Gravity 1.012  


 


Urine Protein 100 mg/dL  


 


Urine Glucose (UA) NEG mg/dL  


 


Urine Ketones NEG mg/dL  


 


Urine Occult Blood LARGE  


 


Urine Nitrite NEG  


 


Urine Bilirubin NEG  


 


Urine Urobilinogen


  LESS THAN 2.0


MG/DL 


 


 


Urine Leukocyte Esterase LARGE  


 


Urine  /hpf  


 


Urine WBC  /hpf  


 


Urine WBC Clumps MANY  


 


Urine Squamous Epithelial


Cells 3 /hpf 


  


 


 


Urine Renal Epithelial Cells <1 /hpf  


 


Urine Mucus FEW /lpf  


 


Microscopic Urinalysis Comment


  CULTURE


INDICATED 


 


 


Blood Urea Nitrogen 7 MG/DL  6 MG/DL 


 


Creatinine 0.87 MG/DL  0.70 MG/DL 


 


Random Glucose 114 MG/DL  93 MG/DL 


 


Total Protein 7.9 GM/DL  


 


Albumin 2.5 GM/DL  


 


Calcium Level 10.7 MG/DL  10.8 MG/DL 


 


Alkaline Phosphatase 91 U/L  


 


Aspartate Amino Transf


(AST/SGOT) 10 U/L 


  


 


 


Alanine Aminotransferase


(ALT/SGPT) 16 U/L 


  


 


 


Total Bilirubin 0.3 MG/DL  


 


Sodium Level 141 MEQ/L  140 MEQ/L 


 


Potassium Level 4.7 MEQ/L  3.9 MEQ/L 


 


Chloride Level 105 MEQ/L  106 MEQ/L 


 


Carbon Dioxide Level 29.3 MEQ/L  26.7 MEQ/L 


 


Anion Gap 7 MEQ/L  7 MEQ/L 


 


Estimat Glomerular Filtration


Rate 77 ML/MIN 


  99 ML/MIN 


 


 


Lipase 216 U/L  











[facility]


Medical Decision Making


Medical Screen Exam Complete:  Yes


Emergency Medical Condition:  Yes


Medical Record Reviewed:  Yes


Interpretation(s)





Last Impressions








Drainage Catheter Insertion 3/19/18 0000 Signed





Impressions: 





 Service Date/Time:  Monday, March 19, 2018 11:40 - CONCLUSION:  Uncomplicated 





 nephroureteral stent placement as above.     Mario Del Rosario MD 


 


Abdomen/Pelvis CT 3/18/18 6926 Signed





Impressions: 





 Service Date/Time:  Monday, March 19, 2018 01:02 - CONCLUSION:  1. No acute 





 findings. 2. Bilateral adrenal masses, lobular on the right and oval in the 





 left. 3. Multiple hepatic and renal cysts and numerous calcified uterine 





 fibroids causing enlargement of the uterus, stable configuration to prior 





 examination. 4. Interval placement of right nephrostomy with large right 





 staghorn calculus. 5. There is a new 2 mm calcified stone in the left 





 posterolateral bladder diverticulum.     Yair San MD 








Differential Diagnosis


Nephrostomy tube dislodgment, versus partial dislodgment, versus clogged 

nephrostomy tube


Narrative Course


During the course of the patient's emergency department visit, the patient's 

history, examination, and differential diagnosis were reviewed with the 

patient. The patient was placed on a cardiac monitor with oximetry and frequent 

blood pressure monitoring. The patient had IV access obtained and blood work 

sent for analysis. 





The patient's studies were reviewed and remarkable for A white count of 6, 

hemoglobin 10.1, platelets 237 with 9.5 monocytes.  CMP is remarkable for a 

glucose of 114, calcium 10.7, AST 10, albumin 2.5, lipase 216.  PT 10.7, PTT 

28.6.  Urinalysis shows large occult blood, large leukocyte esterase, RBCs 104, 

WBCs innumerable, clumps many, culture indicated.  This was a urine from the 

nephrostomy bag on the right side.  Antibiotic treatment was held pending 

culture as the patient is afebrile with a normal white blood cell count.  CT 

scan of the abdomen and pelvis showed no acute findings.  Other findings 

include bilateral adrenal masses, multiple hepatic and renal cysts, interval 

placement of right nephrostomy with large right staghorn calculus.  New 2 mm 

calcified stone in the left posterior lateral bladder diverticulum.





A call was placed out to the gynecologic oncologist that the patient is 

following with according to the record, Dr. Cruz, as there is a record of him 

seeing the patient and scheduling exploratory surgery for the patient's 

abnormal recent Pap smear and possible gynecologic cancer.  Unfortunately he 

was not able to be reached.  I did speak to Dr. Rudolph instead regarding this 

patient's case.  Please see ED physician communication section for further 

details regarding this.





The patient is given an outpatient referral to interventional radiology for 

nephrostomy tube replacement.





The patient is resting comfortably and feels better, is alert and in no 

distress. The patient's results and examination findings were discussed with 

the patient. The repeat examination is unremarkable and benign. The history, 

exam, diagnostic testing, and current condition do not suggest any significant 

pathology to warrant further testing, continued ED treatment, admission, or 

surgical evaluation at this point. The vital signs have been stable. The 

patient does not have uncontrollable pain, intractable vomiting, or other 

significant symptoms. The patient's condition is stable and appropriate for 

discharge. The patient will pursue further outpatient evaluation with a primary 

care physician or other designated or consulting physician as indicated in the 

discharge instructions. The patient expressed understanding and was agreeable 

with this plan.





Physician Communication


Physician Communication


An attempt was made to call  this regarding this patient's case as he 

plans to do in examination under anesthesia this morning, however he was not 

able to be reached, instead I spoke to Dr. Rudolph another oncologist on call.  

We discussed the patient's current situation, her history, examination, 

laboratory studies.  As he does not normally cover for Dr. Cruz he was 

reticent to give recommendations, however he did agree as the patient's kidney 

function is normal, and her other kidney is functional, she could presumably 

follow-up as an outpatient for replacement of her nephrostomy tube.





Diagnosis





 Primary Impression:  


 Staghorn renal calculus


 Additional Impression:  


 Nephrostomy tube displaced


Referrals:  


Ludivina Cruz MD





Primary Care Physician


2 days


Patient Instructions:  General Instructions, Kidney Stones (ED)





***Additional Instructions:  


The patient is given an outpatient referral for replacement of her nephrostomy 

tube through interventional radiology.


***Med/Other Pt SpecificInfo:  No Change to Meds


Disposition:  03 DISCHARGE TO SNF


Condition:  Stable











Lorena Johansen MD Mar 19, 2018 01:14

## 2018-03-19 NOTE — RADRPT
EXAM DATE/TIME:  03/19/2018 11:40 

 

[facility] COMPARISON:  

NEPHROURETERAL CATHETER, RIGHT, February 12, 2018, 15:40.

                        

 

INDICATIONS :      

History of large obstructing staghorn calculus with accidental removal of nephroureteral catheter. Re
attempt at retrograde access was unsuccessful. Therefore, percutaneous access has been requested.

                        

 

MEDICAL HISTORY :     

HTN

Dyslopidemia

Pyelonephritis

Septic shock

UTI

Anemia

 

SURGICAL HISTORY :     

Tubal ligation

 

ENCOUNTER:     

Initial

 

ACUITY:     

2 days

 

PAIN SCORE:     

6/10

 

LOCATION:       

Right lower quadrant 

                        

 

FLUORO TIME:     

15.1 minutes

 

IMAGE SERIES:      

6

 

SEDATION TIME:     

45 minutes

 

CONTRAST:     

25 cc Omnipaque (iohexol) 350 

 

MEDICATION(S):      

1.)  1 mg midazolam (Versed)  IV     

2.)  100 mcg fentanyl (Sublimaze)  IV     

3.)  500 mg levofloxacin (Levaquin)  IV     

      

Intra-procedural antibiotics were given as prescribed above.     

      

 

DEVICE(S):      

1.)  8 Sami [id number] nephroureteral stent      

 

 

PROCEDURE :     

1.  Ultrasound-guided puncture of the kidney.

2.  Antegrade percutaneous pyelogram.

3.  Percutaneous nephroureteral stent placement.

4.  Conscious sedation with continuous EKG and oximetry monitoring.

 

The risks, benefits and alternatives to the procedure were explained and verbal and written consent w
as obtained.  The site was prepped in sterile fashion.  Full sterile technique was used, including ca
p, mask, sterile gloves and gown and a large sterile sheet.  Hand hygiene and 2% chlorhexidine and/or
 betadine/alcohol prep was utilized per protocol for cutaneous antisepsis.  Sterile gel and sterile p
robe cover were utilized for ultrasound guidance.  The skin and subcutaneous tissues were infiltrated
 with local anesthetic solution.  

 

With ultrasound and fluoroscopic guidance the selected kidney was punctured and a percutaneous antegr
patricia pyelogram was performed demonstrating extensive staghorn calculus throughout the right renal tho
ecting system.  Serial dilatation was performed and the prescribed nephroureteral stent was placed wi
th the proximal portion within the renal pelvis and the distal extent in the urinary bladder.  Inject
ion of positive contrast demonstrates good position of the catheter.

 

Conscious sedation was performed with the prescribed dosages and duration as above in the presence of
 an independent trained radiology nurse to assist in the monitoring of the patient.  EKG and oximetry
 remained stable throughout the procedure.  The patient tolerated the procedure well and there were n
o complications.  The patient was sent to post anesthesia recovery in stable condition.

 

CONCLUSION:     

Uncomplicated nephroureteral stent placement as above.

 

 

 

 Mario Del Rosario MD on March 19, 2018 at 13:07           

Board Certified Radiologist.

 This report was verified electronically.

## 2018-03-19 NOTE — PD.RAD
Post Procedure Progress Note


Pre Procedure Diagnosis:  


(1) Staghorn renal calculus


Post Procedure Diagnosis:  


(1) Staghorn renal calculus


(2) Nephrostomy tube displaced


Procedure Date:


Mar 19, 2018


Supervising Radiologist:


Mario Del Rosario


Proceduralist/Assist:  Brynn Fagan, RT(R), Lucy Iyer RT(R)(VI)


Anesthesia:  Conscious Sedation


Plan of Activity


Patient to Unit:  [facility]


Patient Condition:  Good


See PACS Report for procedural detail/treatment











Mario Del Rosario MD Mar 19, 2018 12:37

## 2018-03-26 ENCOUNTER — HOSPITAL ENCOUNTER (OUTPATIENT)
Dept: HOSPITAL 17 - NEPD | Age: 73
Setting detail: OBSERVATION
LOS: 4 days | Discharge: HOME | End: 2018-03-30
Attending: HOSPITALIST | Admitting: HOSPITALIST
Payer: MEDICARE

## 2018-03-26 VITALS
TEMPERATURE: 99.7 F | SYSTOLIC BLOOD PRESSURE: 133 MMHG | OXYGEN SATURATION: 99 % | RESPIRATION RATE: 16 BRPM | DIASTOLIC BLOOD PRESSURE: 78 MMHG | HEART RATE: 96 BPM

## 2018-03-26 VITALS — BODY MASS INDEX: 25.61 KG/M2 | WEIGHT: 163.14 LBS | HEIGHT: 67.01 IN

## 2018-03-26 VITALS — TEMPERATURE: 98.6 F | RESPIRATION RATE: 16 BRPM | OXYGEN SATURATION: 98 % | HEART RATE: 92 BPM

## 2018-03-26 DIAGNOSIS — I25.10: ICD-10-CM

## 2018-03-26 DIAGNOSIS — M79.89: ICD-10-CM

## 2018-03-26 DIAGNOSIS — C53.0: Primary | ICD-10-CM

## 2018-03-26 DIAGNOSIS — G89.29: ICD-10-CM

## 2018-03-26 DIAGNOSIS — Z60.9: ICD-10-CM

## 2018-03-26 DIAGNOSIS — E87.6: ICD-10-CM

## 2018-03-26 DIAGNOSIS — R82.90: ICD-10-CM

## 2018-03-26 DIAGNOSIS — I10: ICD-10-CM

## 2018-03-26 DIAGNOSIS — Z79.01: ICD-10-CM

## 2018-03-26 DIAGNOSIS — E78.5: ICD-10-CM

## 2018-03-26 DIAGNOSIS — M54.6: ICD-10-CM

## 2018-03-26 DIAGNOSIS — Z86.718: ICD-10-CM

## 2018-03-26 DIAGNOSIS — R53.1: ICD-10-CM

## 2018-03-26 DIAGNOSIS — T76.91XA: ICD-10-CM

## 2018-03-26 DIAGNOSIS — Z87.891: ICD-10-CM

## 2018-03-26 DIAGNOSIS — E83.39: ICD-10-CM

## 2018-03-26 DIAGNOSIS — N93.9: ICD-10-CM

## 2018-03-26 LAB
ALBUMIN SERPL-MCNC: 2.5 GM/DL (ref 3.4–5)
ALP SERPL-CCNC: 89 U/L (ref 45–117)
ALT SERPL-CCNC: 19 U/L (ref 10–53)
AST SERPL-CCNC: 14 U/L (ref 15–37)
BASOPHILS # BLD AUTO: 0 TH/MM3 (ref 0–0.2)
BASOPHILS NFR BLD: 0.2 % (ref 0–2)
BILIRUB SERPL-MCNC: 0.5 MG/DL (ref 0.2–1)
BUN SERPL-MCNC: 10 MG/DL (ref 7–18)
CALCIUM SERPL-MCNC: 10.2 MG/DL (ref 8.5–10.1)
CHLORIDE SERPL-SCNC: 102 MEQ/L (ref 98–107)
CREAT SERPL-MCNC: 0.88 MG/DL (ref 0.5–1)
EOSINOPHIL # BLD: 0.1 TH/MM3 (ref 0–0.4)
EOSINOPHIL NFR BLD: 0.8 % (ref 0–4)
ERYTHROCYTE [DISTWIDTH] IN BLOOD BY AUTOMATED COUNT: 17.5 % (ref 11.6–17.2)
GFR SERPLBLD BASED ON 1.73 SQ M-ARVRAT: 76 ML/MIN (ref 89–?)
GLUCOSE SERPL-MCNC: 119 MG/DL (ref 74–106)
HCO3 BLD-SCNC: 26.5 MEQ/L (ref 21–32)
HCT VFR BLD CALC: 31.5 % (ref 35–46)
HGB BLD-MCNC: 10.1 GM/DL (ref 11.6–15.3)
LYMPHOCYTES # BLD AUTO: 1 TH/MM3 (ref 1–4.8)
LYMPHOCYTES NFR BLD AUTO: 10.9 % (ref 9–44)
MCH RBC QN AUTO: 25.6 PG (ref 27–34)
MCHC RBC AUTO-ENTMCNC: 32.2 % (ref 32–36)
MCV RBC AUTO: 79.5 FL (ref 80–100)
MONOCYTE #: 0.7 TH/MM3 (ref 0–0.9)
MONOCYTES NFR BLD: 7.6 % (ref 0–8)
NEUTROPHILS # BLD AUTO: 7.7 TH/MM3 (ref 1.8–7.7)
NEUTROPHILS NFR BLD AUTO: 80.5 % (ref 16–70)
PLATELET # BLD: 321 TH/MM3 (ref 150–450)
PMV BLD AUTO: 7.1 FL (ref 7–11)
PROT SERPL-MCNC: 7.8 GM/DL (ref 6.4–8.2)
RBC # BLD AUTO: 3.96 MIL/MM3 (ref 4–5.3)
SODIUM SERPL-SCNC: 136 MEQ/L (ref 136–145)
WBC # BLD AUTO: 9.5 TH/MM3 (ref 4–11)

## 2018-03-26 PROCEDURE — 81001 URINALYSIS AUTO W/SCOPE: CPT

## 2018-03-26 PROCEDURE — 99153 MOD SED SAME PHYS/QHP EA: CPT

## 2018-03-26 PROCEDURE — 82607 VITAMIN B-12: CPT

## 2018-03-26 PROCEDURE — 97162 PT EVAL MOD COMPLEX 30 MIN: CPT

## 2018-03-26 PROCEDURE — 83735 ASSAY OF MAGNESIUM: CPT

## 2018-03-26 PROCEDURE — 84100 ASSAY OF PHOSPHORUS: CPT

## 2018-03-26 PROCEDURE — 82746 ASSAY OF FOLIC ACID SERUM: CPT

## 2018-03-26 PROCEDURE — 83540 ASSAY OF IRON: CPT

## 2018-03-26 PROCEDURE — 77334 RADIATION TREATMENT AID(S): CPT

## 2018-03-26 PROCEDURE — 77290 THER RAD SIMULAJ FIELD CPLX: CPT

## 2018-03-26 PROCEDURE — 83550 IRON BINDING TEST: CPT

## 2018-03-26 PROCEDURE — 77263 THER RADIOLOGY TX PLNG CPLX: CPT

## 2018-03-26 PROCEDURE — 87106 FUNGI IDENTIFICATION YEAST: CPT

## 2018-03-26 PROCEDURE — 85018 HEMOGLOBIN: CPT

## 2018-03-26 PROCEDURE — 87077 CULTURE AEROBIC IDENTIFY: CPT

## 2018-03-26 PROCEDURE — 99285 EMERGENCY DEPT VISIT HI MDM: CPT

## 2018-03-26 PROCEDURE — G0378 HOSPITAL OBSERVATION PER HR: HCPCS

## 2018-03-26 PROCEDURE — 99152 MOD SED SAME PHYS/QHP 5/>YRS: CPT

## 2018-03-26 PROCEDURE — 76937 US GUIDE VASCULAR ACCESS: CPT

## 2018-03-26 PROCEDURE — 85014 HEMATOCRIT: CPT

## 2018-03-26 PROCEDURE — 96365 THER/PROPH/DIAG IV INF INIT: CPT

## 2018-03-26 PROCEDURE — 99212 OFFICE O/P EST SF 10 MIN: CPT

## 2018-03-26 PROCEDURE — 97110 THERAPEUTIC EXERCISES: CPT

## 2018-03-26 PROCEDURE — 77295 3-D RADIOTHERAPY PLAN: CPT

## 2018-03-26 PROCEDURE — 87186 SC STD MICRODIL/AGAR DIL: CPT

## 2018-03-26 PROCEDURE — 97116 GAIT TRAINING THERAPY: CPT

## 2018-03-26 PROCEDURE — 82728 ASSAY OF FERRITIN: CPT

## 2018-03-26 PROCEDURE — 96372 THER/PROPH/DIAG INJ SC/IM: CPT

## 2018-03-26 PROCEDURE — 80048 BASIC METABOLIC PNL TOTAL CA: CPT

## 2018-03-26 PROCEDURE — 36561 INSERT TUNNELED CV CATH: CPT

## 2018-03-26 PROCEDURE — 77300 RADIATION THERAPY DOSE PLAN: CPT

## 2018-03-26 PROCEDURE — G0463 HOSPITAL OUTPT CLINIC VISIT: HCPCS

## 2018-03-26 PROCEDURE — 85025 COMPLETE CBC W/AUTO DIFF WBC: CPT

## 2018-03-26 PROCEDURE — 96366 THER/PROPH/DIAG IV INF ADDON: CPT

## 2018-03-26 PROCEDURE — C1788 PORT, INDWELLING, IMP: HCPCS

## 2018-03-26 PROCEDURE — 77001 FLUOROGUIDE FOR VEIN DEVICE: CPT

## 2018-03-26 PROCEDURE — 87086 URINE CULTURE/COLONY COUNT: CPT

## 2018-03-26 PROCEDURE — 93971 EXTREMITY STUDY: CPT

## 2018-03-26 PROCEDURE — 80053 COMPREHEN METABOLIC PANEL: CPT

## 2018-03-26 PROCEDURE — 94150 VITAL CAPACITY TEST: CPT

## 2018-03-26 PROCEDURE — 99204 OFFICE O/P NEW MOD 45 MIN: CPT

## 2018-03-26 SDOH — SOCIAL STABILITY - SOCIAL INSECURITY: PROBLEM RELATED TO SOCIAL ENVIRONMENT, UNSPECIFIED: Z60.9
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
## 2018-03-27 VITALS
HEART RATE: 97 BPM | RESPIRATION RATE: 18 BRPM | DIASTOLIC BLOOD PRESSURE: 66 MMHG | OXYGEN SATURATION: 99 % | TEMPERATURE: 98.5 F | SYSTOLIC BLOOD PRESSURE: 128 MMHG

## 2018-03-27 VITALS
TEMPERATURE: 99 F | OXYGEN SATURATION: 99 % | DIASTOLIC BLOOD PRESSURE: 65 MMHG | HEART RATE: 79 BPM | SYSTOLIC BLOOD PRESSURE: 108 MMHG | RESPIRATION RATE: 18 BRPM

## 2018-03-27 VITALS
HEART RATE: 77 BPM | DIASTOLIC BLOOD PRESSURE: 73 MMHG | TEMPERATURE: 98.8 F | RESPIRATION RATE: 16 BRPM | SYSTOLIC BLOOD PRESSURE: 118 MMHG | OXYGEN SATURATION: 98 %

## 2018-03-27 VITALS
OXYGEN SATURATION: 98 % | HEART RATE: 90 BPM | DIASTOLIC BLOOD PRESSURE: 62 MMHG | SYSTOLIC BLOOD PRESSURE: 129 MMHG | TEMPERATURE: 97.8 F | RESPIRATION RATE: 14 BRPM

## 2018-03-27 VITALS
TEMPERATURE: 98.7 F | OXYGEN SATURATION: 99 % | DIASTOLIC BLOOD PRESSURE: 66 MMHG | HEART RATE: 73 BPM | RESPIRATION RATE: 18 BRPM | SYSTOLIC BLOOD PRESSURE: 106 MMHG

## 2018-03-27 VITALS
TEMPERATURE: 98.5 F | DIASTOLIC BLOOD PRESSURE: 63 MMHG | HEART RATE: 86 BPM | RESPIRATION RATE: 18 BRPM | OXYGEN SATURATION: 100 % | SYSTOLIC BLOOD PRESSURE: 138 MMHG

## 2018-03-27 LAB
% SATURATION IRON PROFILE: 18 % (ref 20–50)
BACTERIA #/AREA URNS HPF: (no result) /HPF
BASOPHILS # BLD AUTO: 0 TH/MM3 (ref 0–0.2)
BASOPHILS NFR BLD: 0.2 % (ref 0–2)
BUN SERPL-MCNC: 7 MG/DL (ref 7–18)
CALCIUM SERPL-MCNC: 9.7 MG/DL (ref 8.5–10.1)
CHLORIDE SERPL-SCNC: 105 MEQ/L (ref 98–107)
COLOR UR: YELLOW
CREAT SERPL-MCNC: 0.63 MG/DL (ref 0.5–1)
EOSINOPHIL # BLD: 0.2 TH/MM3 (ref 0–0.4)
EOSINOPHIL NFR BLD: 2.1 % (ref 0–4)
ERYTHROCYTE [DISTWIDTH] IN BLOOD BY AUTOMATED COUNT: 17.6 % (ref 11.6–17.2)
FERRITIN SERPL-MCNC: 1087 NG/ML (ref 8–252)
FOLATE SERPL-MCNC: 8 NG/ML (ref 3.1–17.5)
GFR SERPLBLD BASED ON 1.73 SQ M-ARVRAT: 112 ML/MIN (ref 89–?)
GLUCOSE SERPL-MCNC: 110 MG/DL (ref 74–106)
GLUCOSE UR STRIP-MCNC: (no result) MG/DL
HCO3 BLD-SCNC: 25 MEQ/L (ref 21–32)
HCT VFR BLD CALC: 26 % (ref 35–46)
HCT VFR BLD CALC: 28 % (ref 35–46)
HCT VFR BLD CALC: 29.4 % (ref 35–46)
HGB BLD-MCNC: 8.6 GM/DL (ref 11.6–15.3)
HGB BLD-MCNC: 9.1 GM/DL (ref 11.6–15.3)
HGB BLD-MCNC: 9.5 GM/DL (ref 11.6–15.3)
HGB UR QL STRIP: (no result)
IRON (FE): 28 MCG/DL (ref 50–170)
KETONES UR STRIP-MCNC: (no result) MG/DL
LYMPHOCYTES # BLD AUTO: 1.3 TH/MM3 (ref 1–4.8)
LYMPHOCYTES NFR BLD AUTO: 17.8 % (ref 9–44)
MCH RBC QN AUTO: 26.1 PG (ref 27–34)
MCHC RBC AUTO-ENTMCNC: 33.2 % (ref 32–36)
MCV RBC AUTO: 78.8 FL (ref 80–100)
MONOCYTE #: 0.7 TH/MM3 (ref 0–0.9)
MONOCYTES NFR BLD: 9.6 % (ref 0–8)
MUCOUS THREADS #/AREA URNS LPF: (no result) /LPF
NEUTROPHILS # BLD AUTO: 5.3 TH/MM3 (ref 1.8–7.7)
NEUTROPHILS NFR BLD AUTO: 70.3 % (ref 16–70)
NITRITE UR QL STRIP: (no result)
PLATELET # BLD: 276 TH/MM3 (ref 150–450)
PMV BLD AUTO: 7.1 FL (ref 7–11)
RBC # BLD AUTO: 3.3 MIL/MM3 (ref 4–5.3)
SODIUM SERPL-SCNC: 138 MEQ/L (ref 136–145)
SP GR UR STRIP: 1.01 (ref 1–1.03)
SQUAMOUS #/AREA URNS HPF: 10 /HPF (ref 0–5)
TIBC SERPL-MCNC: 155 MCG/DL (ref 250–450)
TRANS CELLS #/AREA URNS HPF: 1 /HPF
URINE LEUKOCYTE ESTERASE: (no result)
VIT B12 SERPL-MCNC: 497 PG/ML (ref 193–986)
WBC # BLD AUTO: 7.6 TH/MM3 (ref 4–11)
WHITE BLOOD CELL CLUMPS: (no result)

## 2018-03-27 RX ADMIN — ATORVASTATIN CALCIUM SCH MG: 20 TABLET, FILM COATED ORAL at 21:43

## 2018-03-27 RX ADMIN — Medication SCH ML: at 21:43

## 2018-03-27 RX ADMIN — SODIUM CHLORIDE SCH MLS/HR: 900 INJECTION INTRAVENOUS at 17:53

## 2018-03-27 RX ADMIN — HYDROCODONE BITARTRATE AND ACETAMINOPHEN PRN TAB: 5; 325 TABLET ORAL at 12:31

## 2018-03-27 RX ADMIN — Medication SCH ML: at 09:56
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
## 2018-03-28 VITALS
HEART RATE: 89 BPM | TEMPERATURE: 99.3 F | RESPIRATION RATE: 18 BRPM | SYSTOLIC BLOOD PRESSURE: 118 MMHG | OXYGEN SATURATION: 99 % | DIASTOLIC BLOOD PRESSURE: 69 MMHG

## 2018-03-28 VITALS
TEMPERATURE: 98.5 F | OXYGEN SATURATION: 96 % | DIASTOLIC BLOOD PRESSURE: 72 MMHG | RESPIRATION RATE: 17 BRPM | HEART RATE: 104 BPM | SYSTOLIC BLOOD PRESSURE: 131 MMHG

## 2018-03-28 VITALS
RESPIRATION RATE: 14 BRPM | SYSTOLIC BLOOD PRESSURE: 103 MMHG | TEMPERATURE: 99.6 F | HEART RATE: 78 BPM | OXYGEN SATURATION: 96 % | DIASTOLIC BLOOD PRESSURE: 58 MMHG

## 2018-03-28 VITALS
TEMPERATURE: 99.8 F | SYSTOLIC BLOOD PRESSURE: 127 MMHG | OXYGEN SATURATION: 98 % | HEART RATE: 89 BPM | DIASTOLIC BLOOD PRESSURE: 70 MMHG | RESPIRATION RATE: 14 BRPM

## 2018-03-28 VITALS
HEART RATE: 109 BPM | SYSTOLIC BLOOD PRESSURE: 132 MMHG | TEMPERATURE: 97.7 F | RESPIRATION RATE: 18 BRPM | DIASTOLIC BLOOD PRESSURE: 86 MMHG | OXYGEN SATURATION: 96 %

## 2018-03-28 VITALS
TEMPERATURE: 97.8 F | OXYGEN SATURATION: 100 % | DIASTOLIC BLOOD PRESSURE: 68 MMHG | HEART RATE: 93 BPM | RESPIRATION RATE: 18 BRPM | SYSTOLIC BLOOD PRESSURE: 124 MMHG

## 2018-03-28 VITALS
TEMPERATURE: 99.9 F | SYSTOLIC BLOOD PRESSURE: 123 MMHG | DIASTOLIC BLOOD PRESSURE: 66 MMHG | RESPIRATION RATE: 17 BRPM | HEART RATE: 104 BPM | OXYGEN SATURATION: 96 %

## 2018-03-28 LAB
BASOPHILS # BLD AUTO: 0 TH/MM3 (ref 0–0.2)
BASOPHILS NFR BLD: 0.2 % (ref 0–2)
BUN SERPL-MCNC: 6 MG/DL (ref 7–18)
CALCIUM SERPL-MCNC: 10.7 MG/DL (ref 8.5–10.1)
CHLORIDE SERPL-SCNC: 103 MEQ/L (ref 98–107)
CREAT SERPL-MCNC: 0.67 MG/DL (ref 0.5–1)
EOSINOPHIL # BLD: 0.2 TH/MM3 (ref 0–0.4)
EOSINOPHIL NFR BLD: 1.5 % (ref 0–4)
ERYTHROCYTE [DISTWIDTH] IN BLOOD BY AUTOMATED COUNT: 17.8 % (ref 11.6–17.2)
GFR SERPLBLD BASED ON 1.73 SQ M-ARVRAT: 104 ML/MIN (ref 89–?)
GLUCOSE SERPL-MCNC: 80 MG/DL (ref 74–106)
HCO3 BLD-SCNC: 28.5 MEQ/L (ref 21–32)
HCT VFR BLD CALC: 29.9 % (ref 35–46)
HGB BLD-MCNC: 9.7 GM/DL (ref 11.6–15.3)
LYMPHOCYTES # BLD AUTO: 1.3 TH/MM3 (ref 1–4.8)
LYMPHOCYTES NFR BLD AUTO: 12.9 % (ref 9–44)
MAGNESIUM SERPL-MCNC: 2 MG/DL (ref 1.5–2.5)
MCH RBC QN AUTO: 25.8 PG (ref 27–34)
MCHC RBC AUTO-ENTMCNC: 32 % (ref 32–36)
MCV RBC AUTO: 80.6 FL (ref 80–100)
MONOCYTE #: 0.8 TH/MM3 (ref 0–0.9)
MONOCYTES NFR BLD: 8.4 % (ref 0–8)
NEUTROPHILS # BLD AUTO: 7.7 TH/MM3 (ref 1.8–7.7)
NEUTROPHILS NFR BLD AUTO: 77 % (ref 16–70)
PHOSPHATE SERPL-MCNC: 2 MG/DL (ref 2.5–4.9)
PLATELET # BLD: 325 TH/MM3 (ref 150–450)
PMV BLD AUTO: 7.5 FL (ref 7–11)
RBC # BLD AUTO: 3.74 MIL/MM3 (ref 4–5.3)
SODIUM SERPL-SCNC: 137 MEQ/L (ref 136–145)
WBC # BLD AUTO: 10 TH/MM3 (ref 4–11)

## 2018-03-28 RX ADMIN — HYDROCODONE BITARTRATE AND ACETAMINOPHEN PRN TAB: 5; 325 TABLET ORAL at 04:33

## 2018-03-28 RX ADMIN — SODIUM PHOSPHATE, DIBASIC, ANHYDROUS, POTASSIUM PHOSPHATE, MONOBASIC, AND SODIUM PHOSPHATE, MONOBASIC, MONOHYDRATE SCH MG: 852; 155; 130 TABLET, COATED ORAL at 21:20

## 2018-03-28 RX ADMIN — Medication SCH ML: at 09:02

## 2018-03-28 RX ADMIN — ENOXAPARIN SODIUM SCH MG: 40 INJECTION SUBCUTANEOUS at 21:27

## 2018-03-28 RX ADMIN — HYDROCODONE BITARTRATE AND ACETAMINOPHEN PRN TAB: 5; 325 TABLET ORAL at 09:06

## 2018-03-28 RX ADMIN — ATORVASTATIN CALCIUM SCH MG: 20 TABLET, FILM COATED ORAL at 21:20

## 2018-03-28 RX ADMIN — Medication SCH ML: at 21:20

## 2018-03-28 RX ADMIN — SODIUM CHLORIDE SCH MLS/HR: 900 INJECTION INTRAVENOUS at 17:48
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
## 2018-03-28 NOTE — RADRPT
EXAM DATE/TIME:  03/28/2018 22:09 

 

[facility] COMPARISON:     

US ARM LEFT VENOUS DOPPLER, February 18, 2018, 18:49.

        

 

 

INDICATIONS :                

Left arm swelling. 

            

 

MEDICAL HISTORY :        

Hypercholesterolemia. Hypertension. Pylonephritis. Syncope. Chronic UTIs. Back pain. 

 

SURGICAL HISTORY :         

Tubal ligation.

 

ENCOUNTER:     

Subsequent

 

ACUITY:     

2 months

 

PAIN SCORE:      

3/10

 

LOCATION:      

Left  arm. 

                       

 

FINDINGS:     

There is spontaneous flow documented in the brachial, basilic, cephalic, axillary, and subclavian vei
ns.  The vessels are compressible and augmentation response is documented.  No filling defects are se
en.  The flow is phasic with respiration.  Direction of flow in the jugular vein is caudal.  

 

CONCLUSION:     No DVT.

 

 

 

 Jose Guadalupe Shankar MD on March 28, 2018 at 22:30           

Board Certified Radiologist.

 This report was verified electronically.

## 2018-03-29 VITALS
DIASTOLIC BLOOD PRESSURE: 70 MMHG | TEMPERATURE: 98.1 F | HEART RATE: 84 BPM | SYSTOLIC BLOOD PRESSURE: 109 MMHG | OXYGEN SATURATION: 98 % | RESPIRATION RATE: 18 BRPM

## 2018-03-29 VITALS
RESPIRATION RATE: 20 BRPM | OXYGEN SATURATION: 100 % | HEART RATE: 105 BPM | TEMPERATURE: 99.2 F | DIASTOLIC BLOOD PRESSURE: 75 MMHG | SYSTOLIC BLOOD PRESSURE: 136 MMHG

## 2018-03-29 VITALS
SYSTOLIC BLOOD PRESSURE: 111 MMHG | DIASTOLIC BLOOD PRESSURE: 69 MMHG | OXYGEN SATURATION: 97 % | HEART RATE: 90 BPM | TEMPERATURE: 97.9 F | RESPIRATION RATE: 18 BRPM

## 2018-03-29 VITALS
HEART RATE: 111 BPM | TEMPERATURE: 98.7 F | OXYGEN SATURATION: 100 % | RESPIRATION RATE: 16 BRPM | SYSTOLIC BLOOD PRESSURE: 112 MMHG | DIASTOLIC BLOOD PRESSURE: 67 MMHG

## 2018-03-29 VITALS
SYSTOLIC BLOOD PRESSURE: 109 MMHG | DIASTOLIC BLOOD PRESSURE: 65 MMHG | OXYGEN SATURATION: 98 % | RESPIRATION RATE: 19 BRPM | HEART RATE: 110 BPM | TEMPERATURE: 99.8 F

## 2018-03-29 VITALS
TEMPERATURE: 98.7 F | RESPIRATION RATE: 16 BRPM | OXYGEN SATURATION: 100 % | HEART RATE: 111 BPM | DIASTOLIC BLOOD PRESSURE: 67 MMHG | SYSTOLIC BLOOD PRESSURE: 112 MMHG

## 2018-03-29 VITALS
DIASTOLIC BLOOD PRESSURE: 59 MMHG | RESPIRATION RATE: 17 BRPM | SYSTOLIC BLOOD PRESSURE: 115 MMHG | OXYGEN SATURATION: 96 % | HEART RATE: 97 BPM | TEMPERATURE: 99 F

## 2018-03-29 VITALS
DIASTOLIC BLOOD PRESSURE: 69 MMHG | OXYGEN SATURATION: 100 % | SYSTOLIC BLOOD PRESSURE: 109 MMHG | RESPIRATION RATE: 18 BRPM | HEART RATE: 100 BPM

## 2018-03-29 VITALS
SYSTOLIC BLOOD PRESSURE: 113 MMHG | TEMPERATURE: 98.1 F | OXYGEN SATURATION: 98 % | HEART RATE: 89 BPM | RESPIRATION RATE: 18 BRPM | DIASTOLIC BLOOD PRESSURE: 70 MMHG

## 2018-03-29 VITALS
SYSTOLIC BLOOD PRESSURE: 108 MMHG | OXYGEN SATURATION: 100 % | RESPIRATION RATE: 17 BRPM | HEART RATE: 104 BPM | DIASTOLIC BLOOD PRESSURE: 64 MMHG

## 2018-03-29 LAB
HCT VFR BLD CALC: 30.2 % (ref 35–46)
HGB BLD-MCNC: 9.9 GM/DL (ref 11.6–15.3)

## 2018-03-29 RX ADMIN — SODIUM PHOSPHATE, DIBASIC, ANHYDROUS, POTASSIUM PHOSPHATE, MONOBASIC, AND SODIUM PHOSPHATE, MONOBASIC, MONOHYDRATE SCH MG: 852; 155; 130 TABLET, COATED ORAL at 20:43

## 2018-03-29 RX ADMIN — ENOXAPARIN SODIUM SCH MG: 40 INJECTION SUBCUTANEOUS at 20:43

## 2018-03-29 RX ADMIN — SODIUM CHLORIDE SCH MLS/HR: 900 INJECTION INTRAVENOUS at 18:09

## 2018-03-29 RX ADMIN — Medication SCH ML: at 20:44

## 2018-03-29 RX ADMIN — HYDROCODONE BITARTRATE AND ACETAMINOPHEN PRN TAB: 5; 325 TABLET ORAL at 16:45

## 2018-03-29 RX ADMIN — Medication SCH ML: at 09:35

## 2018-03-29 RX ADMIN — LIDOCAINE SCH PATCH: 50 PATCH CUTANEOUS at 09:31

## 2018-03-29 RX ADMIN — ATORVASTATIN CALCIUM SCH MG: 20 TABLET, FILM COATED ORAL at 20:43

## 2018-03-29 RX ADMIN — SODIUM PHOSPHATE, DIBASIC, ANHYDROUS, POTASSIUM PHOSPHATE, MONOBASIC, AND SODIUM PHOSPHATE, MONOBASIC, MONOHYDRATE SCH MG: 852; 155; 130 TABLET, COATED ORAL at 05:33

## 2018-03-29 RX ADMIN — SODIUM PHOSPHATE, DIBASIC, ANHYDROUS, POTASSIUM PHOSPHATE, MONOBASIC, AND SODIUM PHOSPHATE, MONOBASIC, MONOHYDRATE SCH MG: 852; 155; 130 TABLET, COATED ORAL at 16:32

## 2018-03-29 NOTE — MB
cc:

Ludivina Cruz MD, Kelly L MD Factor,Devante Rosales,Juancho Perry,Laura Hernandez MD

****

 

 

DATE:

03/29/2018

 

REQUESTING PHYSICIAN:

Dr. Juancho Rosales and Dr. Brynn Perry

 

REASON FOR CONSULTATION:

Known to us with recent diagnosis of cervical cancer.

 

CHIEF COMPLAINT:

This patient is seen.  Her findings are reviewed.  History is 

reviewed.  She is seen and counseled by me in conjunction with our 

nurse practitioner (Arik Uribe).  I agree with her findings, 

assessment and plan of care.

 

HISTORY OF PRESENT ILLNESS:

This 72-year-old female is admitted to the hospital for reasons 

unrelated to her gynecologic cancer, but this was recently diagnosed. 

She underwent examination under anesthesia, cervix biopsy, cystoscopy,

attempted proctoscopy and found to have a clinical stage II-B 

endocervical adenocarcinoma with clear cell features.

 

Based on these findings, it is recommended that she undergo pelvic 

radiation with cisplatin chemotherapy sensitization as initial form of

treatment to treat the central pelvic tumor and regional lymph nodes. 

Imaging does not show any overt evidence of metastatic disease, 

although it is explained that imaging cannot clarify the presence or 

absence of microscopic metastatic disease.

 

She was scheduled to follow up with us as an outpatient and was 

scheduled for followup with radiation oncology, but she is admitted to

the hospital now because of some type of incident as to where she was 

staying and we are consulted because of this diagnosis.

 

Past medical history, surgical history, medications, family history, 

review of systems, allergies are all as documented in the chart, 

nothing new to add to her history with the exception being a reminder 

that she has very large renal stones and has required percutaneous 

nephrostomy due to obstruction. Prior to her last hospitalization, the

percutaneous nephrostomy tube had become dislodged and this has 

apparently been replaced to help prevent renal obstruction.

 

OBJECTIVE:

Labs on admission H and H 9.5 and 29.4.  Electrolytes:  BUN and 

creatinine are 7 and 0.63.  Potassium is slightly low at 3.1, being 

repleted.  Urine culture:  Immature growth thus far.

 

PHYSICAL EXAMINATION:

She is afebrile, respirations 20, blood pressure 136/75, O2 

saturations 100%.  She had been seen by her nurse practitioner (Arik Uribe) this morning and was also seen by Dr. Devante Beck, radiation 

oncology.  She was counseled regarding recommendations for radiation 

with chemo sensitization, was counseled regarding the potential value 

of a venous access port for better treatment, blood draw and followup 

and to preclude problems with the peripheral veins. She has moved 

forward with venous access port placement.

 

An overview of radiation, the anticipated benefit, the reasons why 

surgery is not able to address this tumor, the potential additional 

value of platinum chemotherapy, the schedule, the anticipated 

strengths, drawbacks and side effects were discussed and reviewed.

 

Furthermore, the unusual, uncommon and perhaps unpredictable nature of

a clear cell carcinoma of the cervix is discussed.  These can behave 

in an unpredictable fashion.  At the present time, the disease 

measurably is confined centrally and regionally and her greatest risk 

to her health at this time is a hemorrhage and ureteral obstruction 

and so a full course with curative intent doses of radiation to the 

pelvis and regional lymph nodes with platinum sensitization is 

recommended.

 

After this, repeat imaging will be considered after she completes 

treatment and there may be a potential role for systemic therapy given

a clear cell carcinoma.

 

Questions were asked and answered.  She seems to understand the 

pertinent aspects of our discussion.  She would like to discuss things

further with her family.  Her family members are aware of the 

aforementioned findings as they were discussed with them after recent 

exam under anesthesia and biopsy.  Whereas she remains not completely 

certain about all the details of the treatment, she has on more than 

one occasion stated that she would be interested in doing whatever it 

takes to address her health problems.  She understands that she is the

ultimate decision maker and, if she changed her mind or elected not to

undergo treatment, we would maximize palliative care and get hospice 

involved. She will take into consideration the pertinent aspects of 

our discussion, but seems to be leaning toward treatment and we will 

have ongoing discussions and education as we move forward.

 

ASSESSMENT AND PLAN:

1.  Stage IIB clear cell cervical cancer.

2.  Discussion as outlined above.

 

PLAN:

1.  Appreciate Dr. Beck seeing her in consultation and appreciate 

him overseeing her pelvic radiation therapy.

2.  She is status post venous access port placement.

3.  We will provide chemotherapy teaching from one of our office 

oncology nurses for weekly cisplatin chemotherapy.

4.  Move forward with treatment as soon as she makes ultimate decision

and treatment is logistically possible.

 

Thank you for the consultation.  We will follow along in her care.

 

 

__________________________________

MD ELDA Jessica//rh

D: 03/29/2018, 03:54 PM

T: 03/29/2018, 04:47 PM

Visit #: [id number]

Job #: [id number]

## 2018-03-29 NOTE — PD.CONS
History of Present Illness


Service


gyn/onc


Consult Requested By


Dr. Perry


Reason for Consult


cervical cancer


Primary Care Physician


Juancho Rosales DO


Diagnoses:  


(1) Cervical cancer


History of Present Illness


This is a 72 year old female who was seen as a new patient in gyn/on clinic on 3

/13/18 for vaginal bleeding for many years. She was seen by Dr. Perez who 

obtained a pap smear that resulted as NANO and she described a cervical mass. 

Due to these finding she was taken for an exam under anesthesia on 3/19/18 

where biopsies obtained shown clear cell adenocarcinoma.  She is seen in 

consult for these findings.  Both patient and her nurse tell me she continues 

to have some vaginal bleeding. I explained that this is caused by the tumor.  I 

explained to Ms. Park that the final pathology was consistent with a clear 

cell cancer that is arising from the cervix and it is best treated with 

radiation along with weekly IV chemotherapy.  Dr. Cruz has already consulted 

rad/onc.  I explained that if she did not choose treatment that we would get 

Hospice to see her because this cancer will continue to spread and grow, 

Hospice can help give her a quality of life for the time she has.  I told her a 

decision does not need to be made today, she needs to talk with her daughter 

and son who help her in her medical decisions.





Review of Systems


ROS Limitations:  Clinical Condition, Poor Historian


Genitourinary:  COMPLAINS OF: Abnormal vaginal bleeding


Musculoskeletal:  COMPLAINS OF: Joint pain, Muscle aches, Neck pain


Except as stated in HPI:  all other systems reviewed are Neg





Past Family Social History


Allergies:  


Coded Allergies:  


     No Known Allergies (Unverified  Allergy, Unknown, 3/18/18)


Past Medical History


CBP


staghorn calculus


clear cell cancer of cervix


reflux


high cholesterol


hypertension


Past Surgical History


EUA with cervical bx


tubal ligation


Reported Medications


per EMR


Active Ordered Medications





Current Medications


Sodium Chloride (NS Flush) 2 ml UNSCH  PRN IV FLUSH FLUSH AFTER USING IV ACCESS

;  Start 3/26/18 at 19:00;  Stop 3/27/18 at 09:03;  Status DC


Sodium Chloride (NS Flush) 2 ml UNSCH  PRN IV FLUSH FLUSH AFTER USING IV ACCESS 

Last administered on 3/28/18at 17:48;  Start 3/26/18 at 21:45


Sodium Chloride (NS Flush) 2 ml BID IV FLUSH  Last administered on 3/28/18at 21:

20;  Start 3/27/18 at 09:00


Acetaminophen (Tylenol) 650 mg Q4H  PRN PO TEMP > 100.4/pain;  Start 3/26/18 at 

21:45


Heparin Sodium (Porcine) (Heparin Inj) 5,000 units Q8H SQ ;  Start 3/26/18 at 22

:00;  Stop 3/26/18 at 22:00;  Status DC


Naloxone HCl (Narcan Inj) 0.4 mg UNSCH  PRN IV PUSH SEE LABEL COMMENTS;  Start 3

/26/18 at 21:45


Heparin Sodium (Porcine) (Heparin Inj) 5,000 units Q8H SQ ;  Start 3/27/18 at 06

:00;  Status Cancel


Potassium Chloride (KCl) 40 meq ONCE  ONCE PO  Last administered on 3/27/18at 07

:27;  Start 3/27/18 at 01:45;  Stop 3/27/18 at 01:47;  Status DC


Apixaban (Eliquis) 2.5 mg BID PO  Last administered on 3/27/18at 09:56;  Start 3

/27/18 at 09:00;  Status Future Hold


Atorvastatin Calcium (Lipitor) 20 mg HS PO  Last administered on 3/28/18at 21:20

;  Start 3/27/18 at 21:00


Acetaminophen/ Hydrocodone Bitart (Norco  5-325 Mg) 1 tab Q4H  PRN PO PAIN 1-10 

Last administered on 3/28/18at 09:06;  Start 3/27/18 at 02:00


Potassium Chloride (KCl) 40 meq ONCE  ONCE PO  Last administered on 3/27/18at 09

:56;  Start 3/27/18 at 09:15;  Stop 3/27/18 at 09:16;  Status DC


Ceftriaxone Sodium 1000 mg/ Sodium Chloride 100 ml @  200 mls/hr Q24H IV  Last 

administered on 3/28/18at 17:48;  Start 3/27/18 at 18:00


Lidocaine HCl (Lidoderm 5% Patch.12 Hr) 1 patch DAILY T-DERMAL ;  Start 3/29/18 

at 09:00


Miscellaneous Information 1 Q24H T-DERMAL ;  Start 3/28/18 at 21:00


Potassium Phos/ Sodium Phos (K-Phos Neutral) 250 mg Q8HR PO  Last administered 

on 3/29/18at 05:33;  Start 3/28/18 at 22:00;  Stop 3/30/18 at 21:59


Enoxaparin Sodium (Lovenox Inj) 40 mg Q24H SQ  Last administered on 3/28/18at 21

:27;  Start 3/28/18 at 22:00


Family History


unremarkable


Social History





past smoker


denies ETOH


has one daughter and a son





Physical Exam


Vital Signs





Vital Signs








  Date Time  Temp Pulse Resp B/P (MAP) Pulse Ox O2 Delivery O2 Flow Rate FiO2


 


3/29/18 08:08 98.1 89 18 113/70 (84) 98   


 


3/29/18 03:04 99.0 97 17 115/59 (77) 96   


 


3/28/18 23:17 99.9 104 17 123/66 (85) 96   


 


3/28/18 19:37 98.5 104 17 131/72 (91) 96   


 


3/28/18 15:50 97.7 109 18 132/86 (101) 96   


 


3/28/18 12:28 97.8 93 18 124/68 (86) 100   








Physical Exam


GENERAL: This is a well-nourished, well-developed patient, in no apparent 

distress.


SKIN: No rashes, ecchymoses or lesions. Cool and dry.


HEAD: Atraumatic. Normocephalic. 


EYES: Pupils equal round and reactive. Extraocular motions intact. No scleral 

icterus. No injection or drainage


NECK: Trachea midline. No JVD 


CARDIOVASCULAR: Regular rate and rhythm without murmurs, gallops, or rubs. 


RESPIRATORY: Clear to auscultation. Breath sounds equal bilaterally. No wheezes

, rales, or rhonchi


GYN: small amount of old dry blood on joy pad  


GASTROINTESTINAL: Abdomen soft, non-tender, nondistended. 


MUSCULOSKELETAL: Extremities without clubbing, cyanosis, or edema. 


NEUROLOGICAL: Awake and alert.   Normal speech.


Laboratory





Laboratory Tests








Test


  3/28/18


13:21 3/29/18


07:00


 


White Blood Count 10.0  


 


Red Blood Count 3.74  


 


Hemoglobin 9.7  9.9 


 


Hematocrit 29.9  30.2 


 


Mean Corpuscular Volume 80.6  


 


Mean Corpuscular Hemoglobin 25.8  


 


Mean Corpuscular Hemoglobin


Concent 32.0 


  


 


 


Red Cell Distribution Width 17.8  


 


Platelet Count 325  


 


Mean Platelet Volume 7.5  


 


Neutrophils (%) (Auto) 77.0  


 


Lymphocytes (%) (Auto) 12.9  


 


Monocytes (%) (Auto) 8.4  


 


Eosinophils (%) (Auto) 1.5  


 


Basophils (%) (Auto) 0.2  


 


Neutrophils # (Auto) 7.7  


 


Lymphocytes # (Auto) 1.3  


 


Monocytes # (Auto) 0.8  


 


Eosinophils # (Auto) 0.2  


 


Basophils # (Auto) 0.0  


 


CBC Comment DIFF FINAL  


 


Differential Comment   


 


Blood Urea Nitrogen 6  


 


Creatinine 0.67  


 


Random Glucose 80  


 


Calcium Level 10.7  


 


Phosphorus Level 2.0  


 


Magnesium Level 2.0  


 


Sodium Level 137  


 


Potassium Level 3.8  


 


Chloride Level 103  


 


Carbon Dioxide Level 28.5  


 


Anion Gap 6  


 


Estimat Glomerular Filtration


Rate 104 


  


 














 Date/Time


Source Procedure


Growth Status


 


 


 3/27/18 15:45


Urine Catheterized Urine Urine Culture - Preliminary


IMMATURE GROWTH - REINCUBATE Resulted








Result Diagram:  


3/29/18 0700                                                                   

             3/28/18 1321





Imaging





Last Impressions








Upper Extremity Ultrasound 3/28/18 0000 Signed





Impressions: 





 Service Date/Time:  Wednesday, March 28, 2018 22:09 - CONCLUSION: No DVT.     





 Jose Guadalupe Shankar MD 











Assessment and Plan


Problem List:  


(1) Cervical cancer


ICD Codes:  C53.9 - Malignant neoplasm of cervix uteri, unspecified


Plan:  recommended treatment radiation with weekly cisplatin 





radiology oncology has been consulted


patient to decide if she wishes to move forwards with


recommended treatment, wants to discuss with daughter and son


if she chooses not to move forward with treatment will consult Hospice





Discussed Condition With


patient 


RN


Physician Attestation


This consult will be discussed in detail with Dr. Cruz and any further orders 

will follow.





Problem Qualifiers





(1) Cervical cancer:  


Qualified Codes:  C53.9 - Malignant neoplasm of cervix uteri, unspecified








Arik Uribe Mar 29, 2018 09:31
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
## 2018-03-29 NOTE — RC
cc:

Devante Beck MD,Ludivina Perry,Brynn OLIVER MD

****

 

 

DATE OF SERVICE:

 

Nati Park is a 72-year-old female I am asked to see by Dr. Perry 

and Dr. Cruz.  A copy of this report will be forwarded to Dr. Perry and Dr. Cruz to aid in the medical decision-making process. 

I discussed case with Dr. Perry and ALEX Cortes.

 

HISTORY OF PRESENT ILLNESS:

Ms. Park is a 72-year-old female diagnosed with a clear cell 

adenocarcinoma also involving left vaginal upper sidewall on biopsy 

obtained 03/19/2018.  She does report some bleeding with urination.  

She is inpatient at this time.

 

Examination by Dr. Cruz demonstrates a cervical mass, approximately 

3-4 cm.  She has postmenopausal bleeding, uncertain duration.  

Difficult to tell, off and on for some time.  Dr. Perez reports a 

4 cm friable mass arising from the cervix, possible barrel-shaped 

cervix suspicious for malignancy.

 

PAST MEDICAL, PAST SURGICAL HISTORY:

Includes no prior radiation therapy.  She denies history of lupus, 

collagen vascular disease, ulcerative colitis or Crohn's.  She has a 

history of a tubal ligation, hypertension, elevated cholesterol, GERD,

and bleeding after urination.

 

FAMILY HISTORY:

Unremarkable.

 

SOCIAL HISTORY:

She is .  Smoked but no longer does.

 

GYN HISTORY:

G5, P5.

 

ALLERGIES:

NO KNOWN DRUG ALLERGIES.

 

MEDICATIONS:

1.  Amlodipine.

2.  Atorvastatin.

 

REVIEW OF SYSTEMS:

CONSTITUTIONAL:  Denies weight loss, fevers, chills.

EYES:  Denies double vision or decreased vision.

EARS, NOSE, MOUTH:  Denies any pain with swallowing.

GI:  Denies nausea, vomiting, diarrhea.

GENITOURINARY:  Reports blood after she urinates.  Denies any pelvic 

discomfort.

 

 

PHYSICAL EXAMINATION:

GENERAL:  Pleasant female, comfortable.

EYES:  Extraocular muscles are intact.  No scleral icterus.

EXTREMITIES:  No clubbing, cyanosis or edema..

NEUROLOGIC:    Alert and oriented.  Able to follow two-step commands. 

Gait - No ataxia.

 

We discussed radiation therapy that is to reduce bleeding, to 

potentially stop bleeding.  We discussed CT simulation.  She is 

agreeable to setting up radiation therapy at this time.  She may 

discuss further treatment with her daughter.  We discussed radiation 

therapy has been shown to decrease the risk of bleeding.  She is 

scheduled for a port.  She may proceed with chemotherapy first.  

Discussed this with Dr. Cruz.  I did discuss her case with Dr. Perry.  We discussed potential side effects of radiation therapy.  

We will schedule her to come from [facility] to our department for simulation

this afternoon in anticipation of potential radiation to stop 

bleeding.  She is agreeable to this approach at this time.

 

We will try and obtain outside records from Dr. Perez as well.

 

REVIEWED IMAGING:

Includes CT abdomen and pelvis obtained 03/18/2018, reviewed this.   

CT obtained and reviewed.

 

CT obtained 03/19/2018:  Area noted potential periaortic adenopathy.  

Also, pelvic mass potentially.  Noted some fullness in the low pelvis 

soft tissue area.  No suspicious inguinal lymph nodes on review.  

Official report notes uterine fibroids enlarging the uterus, bilateral

adrenal masses.



Discussed with Dr. Cruz.  Recommendation is to proceed with definitive

chemo-xrt at this time and plan HDR brachytherapy as well.

 

 

__________________________________

MD BERYL Henning/SB

D: 03/29/2018, 08:56 AM

T: 03/29/2018, 09:27 AM

Visit #: [id number]

Job #: [id number]

FANNY

## 2018-03-29 NOTE — PD.RAD
Post Procedure Progress Note


Pre Procedure Diagnosis:  


(1) Cervical cancer


Post Procedure Diagnosis:  


(1) Cervical cancer


Procedure Date:


Mar 29, 2018


Supervising Radiologist:


Jono Black


Proceduralist/Assist:  Stephon Buckley RT(R), RT Judie(R)


Anesthesia:  Local


Plan of Activity


Patient to Unit:  Nursing Unit


Patient Condition:  Good


See PACS Report for procedural detail/treatment





Spinal Procedure


Lumbar Puncture


L3-L4


Fluid Removal (CCs):  8


Fluid Description:  Jono Benedict MD Mar 29, 2018 11:08

## 2018-03-29 NOTE — PD.ONC.PN
Subjective


Subjective


Remarks


Afebrile overnight. 


Patient just back from radiation simulation. 


NO complaints. 


resting comfortably in room.





Objective


Data











  Date Time  Temp Pulse Resp B/P (MAP) Pulse Ox O2 Delivery O2 Flow Rate FiO2


 


3/29/18 12:45 99.2 105 20 136/75 (95) 100   


 


3/29/18 11:50  100 18 109/69 (82) 100   


 


3/29/18 11:20  104 17 108/64 (79) 100   


 


3/29/18 11:05 98.7 111 16 112/67 (82) 100   


 


3/29/18 11:00 98.7 111 16 112/67 (82) 100   


 


3/29/18 08:08 98.1 89 18 113/70 (84) 98   


 


3/29/18 03:04 99.0 97 17 115/59 (77) 96   


 


3/28/18 23:17 99.9 104 17 123/66 (85) 96   


 


3/28/18 19:37 98.5 104 17 131/72 (91) 96   


 


3/28/18 15:50 97.7 109 18 132/86 (101) 96   














 3/29/18 3/29/18 3/29/18





 07:00 15:00 23:00


 


Output Total  1500 ml 


 


Balance  -1500 ml 








Result Diagram:  


3/29/18 0700                                                                   

             3/28/18 1321





Laboratory Results





Laboratory Tests








Test


  3/29/18


07:00


 


Hemoglobin 9.9 GM/DL 


 


Hematocrit 30.2 % 








Culture Results





Microbiology








 Date/Time


Source Procedure


Growth Status


 


 


 3/27/18 15:45


Urine Catheterized Urine Urine Culture - Preliminary


IMMATURE GROWTH - REINCUBATE Resulted








Imaging Studies





Last 24 hours Impressions








Port Line Insertion 3/29/18 0000 Signed





Impressions: 





 Service Date/Time:  Thursday, March 29, 2018 10:35 - CONCLUSION:  

Uncomplicated 





 ultrasound and fluoroscopic guided implanted central venous port catheter 





 placement as described in detail above.  An 8 [place] Power port was placed.   

  





 Jono Black MD 











Administered Medications








 Medications


  (Trade)  Dose


 Ordered  Sig/[place]


 Route


 PRN Reason  Start Time


 Stop Time Status Last Admin


Dose Admin


 


 Sodium Chloride


  (NS Flush)  2 ml  UNSCH  PRN


 IV FLUSH


 FLUSH AFTER USING IV ACCESS  3/26/18 21:45


    3/28/18 17:48


 


 


 Sodium Chloride


  (NS Flush)  2 ml  BID


 IV FLUSH


   3/27/18 09:00


    3/29/18 09:35


 


 


 Apixaban


  (Eliquis)  2.5 mg  BID


 PO


   3/27/18 09:00


   Future Hold 3/27/18 09:56


 


 


 Atorvastatin


 Calcium


  (Lipitor)  20 mg  HS


 PO


   3/27/18 21:00


    3/28/18 21:20


 


 


 Acetaminophen/


 Hydrocodone Bitart


  (Norco  5-325 Mg)  1 tab  Q4H  PRN


 PO


 PAIN 1-10  3/27/18 02:00


    3/28/18 09:06


 


 


 Ceftriaxone


 Sodium 1000 mg/


 Sodium Chloride  100 ml @ 


 200 mls/hr  Q24H


 IV


   3/27/18 18:00


    3/28/18 17:48


 


 


 Lidocaine HCl


  (Lidoderm 5%


 Patch.12 Hr)  1 patch  DAILY


 T-DERMAL


   3/29/18 09:00


    3/29/18 09:31


 


 


 Potassium Phos/


 Sodium Phos


  (K-Phos Neutral)  250 mg  Q8HR


 PO


   3/28/18 22:00


 3/30/18 21:59  3/29/18 05:33


 


 


 Enoxaparin Sodium


  (Lovenox Inj)  40 mg  Q24H


 SQ


   3/28/18 22:00


    3/28/18 21:27


 








Objective Remarks


GENERAL: pleasant elderly female, lying in bed in nad. 


SKIN: Warm and dry.


HEAD: Normocephalic.


EYES: No injection or drainage. 


NECK: Supple, trachea midline. 


CARDIOVASCULAR: +S1/S2


RESPIRATORY: Breath sounds equal bilaterally. No accessory muscle use.


GASTROINTESTINAL: Abdomen soft, non-tender, nondistended. 


EXTREMITIES: No cyanosis


NEUROLOGICAL: awake and alert. normal speech





Assessment/Plan


Problem List:  


(1) history of catheter associated upper extremity thrombosis


Assessment


71y/o female with left upper extremity catheter related thromboses.


h/o hypercholesterolemia, coronary artery disease, hypertension. 


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 

sidewall,


Plan


1. h/o catheter associated LUE thrombosis: repeat ultrasound of the left upper 

extremity shows no blood clot.  


2. recommend continuing prophylactic dose of Lovenox as she is at high risk of 

increased blood clot d/t malignancy.


Attending Statement


As discussed.  DVT prophylaxis.  Previous catheter related clot resolved.


Discussed w/ Dr. Beck and Dr. Cruz who are managing her newly dx cervical 

cancer.











Grace Ignacio Mar 29, 2018 15:45


Brynn Perry MD Mar 29, 2018 22:55

## 2018-03-29 NOTE — RADRPT
EXAM DATE/TIME:  03/29/2018 10:35 

 

[facility] COMPARISON:  

No previous studies available for comparison.

                     

 

INDICATIONS :               

Patient presents with cervical cancer in need of port placement for chemotherapy treatment.

                     

 

MEDICAL HISTORY :     

CBP

Staghorn calculus

Endocervical adenocarcinoma

Remainder of past medical history is unknown as patient is not able to provide it

 

SURGICAL HISTORY :     

Nephrostomy tube placement

Cervical exam and biopsies done under anesthesia

 

ENCOUNTER:     

Initial

 

ACUITY:     

3 days

 

PAIN SCORE:     

10/10

 

LOCATION:       

Lower back pain

                     

 

FLUORO TIME:     

0.7 minutes

 

IMAGE SERIES:      

1

 

SEDATION TIME:       

45 minutes

                     

 

ACCESS:     

Right internal jugular vein 

 

SEDATION:      

1.)  4.5 mg midazolam (Versed)  IV     

2.)  225 mcg fentanyl (Sublimaze)  IV     

      

Prophylactic antibiotics were administered with appropriate pre-procedure timing.     

Vancomycin within 2 hours of procedure, Ancef (or alternative) within 1 hour of procedure.     

      

 

DEVICE:      

1.  8 [place] single lumen    Xcela plus port     

 

 

PROCEDURE :     

1.  Continuous pulse oximetry and EKG monitoring.

2.  Intravenous conscious sedation.

3.  Ultrasound guidance for venous access.

4.  Fluoroscopic guided implantable central venous port placement.

 

The patient was placed supine. The neck was prepped in sterile fashion.  Full sterile technique was u
sed, including cap, mask, sterile gloves and gown, and a large sterile sheet.  Hand hygiene and 2% ch
lorhexidine Betadine was utilized per protocol for cutaneous antisepsis with appropriate dry time for
 site.  Sterile gel and sterile probe cover were utilized for ultrasound guidance.   The skin and sub
cutaneous tissues were infiltrated with local anesthetic solution.  

 

Under direct ultrasound guidance, central venous access was accomplished in the targeted vessel.  The
 ultrasound images depicting access guidance were stored and saved to PACS for permanent record.  A s
ubcutaneous pocket was created using blunt dissection.  The port was introduced to the pocket.  The c
atheter tubing was fed through a subcutaneous tunnel to the venotomy site.  The catheter tubing was c
ut to a suitable length and then was introduced through a valved Peel-Away sheath and positioned with
 catheter tubing tip at the cavo-atrial junction level.  The pocket incision was closed with subcutic
ular Vicryl suture.  Steri-Strips were applied.  The port was flushed and locked with heparin solutio
n per protocol.  Sterile dressing was applied to the site.  The patient tolerated the procedure well.


 

Conscious sedation was performed with the prescribed dosages and duration as above in the presence of
 an independent trained radiology nurse to assist in the monitoring of the patient.  EKG and oximetry
 remained stable throughout the procedure. The patient tolerated the procedure well and there were no
 complications. The patient was sent to post anesthesia recovery in stable condition.

 

CONCLUSION:     

Uncomplicated ultrasound and fluoroscopic guided implanted central venous port catheter placement as 
described in detail above.  An 8 Maur[place] Power port was placed.

 

 

 

 Jono Black MD on March 29, 2018 at 11:20           

Board Certified Radiologist.

 This report was verified electronically.
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
## 2018-03-30 VITALS
TEMPERATURE: 100.5 F | HEART RATE: 98 BPM | RESPIRATION RATE: 19 BRPM | OXYGEN SATURATION: 99 % | SYSTOLIC BLOOD PRESSURE: 129 MMHG | DIASTOLIC BLOOD PRESSURE: 80 MMHG

## 2018-03-30 VITALS
HEART RATE: 89 BPM | SYSTOLIC BLOOD PRESSURE: 127 MMHG | TEMPERATURE: 99.1 F | DIASTOLIC BLOOD PRESSURE: 72 MMHG | OXYGEN SATURATION: 98 % | RESPIRATION RATE: 20 BRPM

## 2018-03-30 VITALS
HEART RATE: 83 BPM | OXYGEN SATURATION: 98 % | TEMPERATURE: 98.2 F | RESPIRATION RATE: 20 BRPM | SYSTOLIC BLOOD PRESSURE: 109 MMHG | DIASTOLIC BLOOD PRESSURE: 63 MMHG

## 2018-03-30 VITALS
SYSTOLIC BLOOD PRESSURE: 115 MMHG | OXYGEN SATURATION: 99 % | RESPIRATION RATE: 20 BRPM | TEMPERATURE: 98 F | DIASTOLIC BLOOD PRESSURE: 69 MMHG | HEART RATE: 84 BPM

## 2018-03-30 LAB
BASOPHILS # BLD AUTO: 0 TH/MM3 (ref 0–0.2)
BASOPHILS NFR BLD: 0.3 % (ref 0–2)
BUN SERPL-MCNC: 8 MG/DL (ref 7–18)
CALCIUM SERPL-MCNC: 10 MG/DL (ref 8.5–10.1)
CHLORIDE SERPL-SCNC: 101 MEQ/L (ref 98–107)
CREAT SERPL-MCNC: 0.69 MG/DL (ref 0.5–1)
EOSINOPHIL # BLD: 0.2 TH/MM3 (ref 0–0.4)
EOSINOPHIL NFR BLD: 1.9 % (ref 0–4)
ERYTHROCYTE [DISTWIDTH] IN BLOOD BY AUTOMATED COUNT: 17.5 % (ref 11.6–17.2)
GFR SERPLBLD BASED ON 1.73 SQ M-ARVRAT: 101 ML/MIN (ref 89–?)
GLUCOSE SERPL-MCNC: 116 MG/DL (ref 74–106)
HCO3 BLD-SCNC: 27.5 MEQ/L (ref 21–32)
HCT VFR BLD CALC: 29.3 % (ref 35–46)
HGB BLD-MCNC: 9.5 GM/DL (ref 11.6–15.3)
LYMPHOCYTES # BLD AUTO: 1 TH/MM3 (ref 1–4.8)
LYMPHOCYTES NFR BLD AUTO: 11.5 % (ref 9–44)
MCH RBC QN AUTO: 26 PG (ref 27–34)
MCHC RBC AUTO-ENTMCNC: 32.5 % (ref 32–36)
MCV RBC AUTO: 79.9 FL (ref 80–100)
MONOCYTE #: 0.8 TH/MM3 (ref 0–0.9)
MONOCYTES NFR BLD: 9.3 % (ref 0–8)
NEUTROPHILS # BLD AUTO: 6.6 TH/MM3 (ref 1.8–7.7)
NEUTROPHILS NFR BLD AUTO: 77 % (ref 16–70)
PHOSPHATE SERPL-MCNC: 2.3 MG/DL (ref 2.5–4.9)
PLATELET # BLD: 308 TH/MM3 (ref 150–450)
PMV BLD AUTO: 7.4 FL (ref 7–11)
RBC # BLD AUTO: 3.67 MIL/MM3 (ref 4–5.3)
SODIUM SERPL-SCNC: 136 MEQ/L (ref 136–145)
WBC # BLD AUTO: 8.6 TH/MM3 (ref 4–11)

## 2018-03-30 RX ADMIN — SODIUM PHOSPHATE, DIBASIC, ANHYDROUS, POTASSIUM PHOSPHATE, MONOBASIC, AND SODIUM PHOSPHATE, MONOBASIC, MONOHYDRATE SCH MG: 852; 155; 130 TABLET, COATED ORAL at 13:09

## 2018-03-30 RX ADMIN — SODIUM PHOSPHATE, DIBASIC, ANHYDROUS, POTASSIUM PHOSPHATE, MONOBASIC, AND SODIUM PHOSPHATE, MONOBASIC, MONOHYDRATE SCH MG: 852; 155; 130 TABLET, COATED ORAL at 05:29

## 2018-03-30 RX ADMIN — LIDOCAINE SCH PATCH: 50 PATCH CUTANEOUS at 09:18

## 2018-03-30 RX ADMIN — HYDROCODONE BITARTRATE AND ACETAMINOPHEN PRN TAB: 5; 325 TABLET ORAL at 01:37

## 2018-03-30 RX ADMIN — Medication SCH ML: at 09:23

## 2018-03-30 NOTE — PD.ONC.PN
Subjective


Subjective


Remarks


Afebrile overnight. 


Patient resting in bed in nad. 


has no complaints. 


denies pain.





Objective


Data











  Date Time  Temp Pulse Resp B/P (MAP) Pulse Ox O2 Delivery O2 Flow Rate FiO2


 


3/30/18 08:47 98.0 84 20 115/69 (84) 99   


 


3/30/18 03:28 98.2 83 20 109/63 (78) 98   


 


3/29/18 23:12 98.1 84 18 109/70 (83) 98   


 


3/29/18 20:16 97.9 90 18 111/69 (83) 97   


 


3/29/18 16:47 99.8 110 19 109/65 (80) 98   


 


3/29/18 12:45 99.2 105 20 136/75 (95) 100   


 


3/29/18 11:50  100 18 109/69 (82) 100   


 


3/29/18 11:20  104 17 108/64 (79) 100   


 


3/29/18 11:05 98.7 111 16 112/67 (82) 100   


 


3/29/18 11:00 98.7 111 16 112/67 (82) 100   














 3/30/18 3/30/18 3/30/18





 07:00 15:00 23:00


 


Output Total 300 ml  


 


Balance -300 ml  








Result Diagram:  


3/29/18 0700                                                                   

             3/28/18 1321





Culture Results





Microbiology








 Date/Time


Source Procedure


Growth Status


 


 


 3/27/18 15:45


Urine Catheterized Urine Urine Culture - Preliminary


IMMATURE GROWTH - REINCUBATE Resulted











Administered Medications








 Medications


  (Trade)  Dose


 Ordered  Sig/[place]


 Route


 PRN Reason  Start Time


 Stop Time Status Last Admin


Dose Admin


 


 Sodium Chloride


  (NS Flush)  2 ml  UNSCH  PRN


 IV FLUSH


 FLUSH AFTER USING IV ACCESS  3/26/18 21:45


    3/28/18 17:48


 


 


 Sodium Chloride


  (NS Flush)  2 ml  BID


 IV FLUSH


   3/27/18 09:00


    3/30/18 09:23


 


 


 Apixaban


  (Eliquis)  2.5 mg  BID


 PO


   3/27/18 09:00


   Future Hold 3/27/18 09:56


 


 


 Atorvastatin


 Calcium


  (Lipitor)  20 mg  HS


 PO


   3/27/18 21:00


    3/29/18 20:43


 


 


 Acetaminophen/


 Hydrocodone Bitart


  (Norco  5-325 Mg)  1 tab  Q4H  PRN


 PO


 PAIN 1-10  3/27/18 02:00


    3/30/18 01:37


 


 


 Ceftriaxone


 Sodium 1000 mg/


 Sodium Chloride  100 ml @ 


 200 mls/hr  Q24H


 IV


   3/27/18 18:00


    3/29/18 18:09


 


 


 Lidocaine HCl


  (Lidoderm 5%


 Patch.12 Hr)  1 patch  DAILY


 T-DERMAL


   3/29/18 09:00


    3/30/18 09:18


 


 


 Miscellaneous


 Information  1  Q24H


 T-DERMAL


   3/28/18 21:00


    3/30/18 09:18


 


 


 Potassium Phos/


 Sodium Phos


  (K-Phos Neutral)  250 mg  Q8HR


 PO


   3/28/18 22:00


 3/30/18 21:59  3/30/18 05:29


 


 


 Enoxaparin Sodium


  (Lovenox Inj)  40 mg  Q24H


 SQ


   3/28/18 22:00


    3/29/18 20:43


 








Objective Remarks


GENERAL: Pleasant elderly female, sitting up in bed in nad. 


SKIN: Warm and dry.


HEAD: Normocephalic.


EYES: no injection or drainage. 


NECK: Supple, trachea midline


CARDIOVASCULAR: Regular rate and rhythm


RESPIRATORY: anterior fields clear. 


GASTROINTESTINAL: Abdomen soft, non-tender, nondistended. 


EXTREMITIES: No cyanosis


NEUROLOGICAL: awake and alert. normal speech.





Assessment/Plan


Problem List:  


(1) history of catheter associated upper extremity thrombosis


Assessment


71y/o female with left upper extremity catheter related thromboses.


h/o hypercholesterolemia, coronary artery disease, hypertension. 


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 

sidewall,


Plan


1. continue prophylactic dose Lovenox


2. defer to GYN oncology for management of locally advanced cervical cancer. 


3. hematology will sign off. please call or reconsult if needed.


Attending Statement


As discussed. FU as out pt. L arm thrombosis resolved by US.











Grace Ignacio Mar 30, 2018 10:49


Brynn Perry MD Mar 30, 2018 14:48
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
## 2018-04-05 ENCOUNTER — HOSPITAL ENCOUNTER (INPATIENT)
Dept: HOSPITAL 17 - HSDC | Age: 73
LOS: 4 days | Discharge: NURSING, REHAB OR LONG-TERM CARE | DRG: 661 | End: 2018-04-09
Payer: MEDICARE

## 2018-04-05 VITALS
RESPIRATION RATE: 16 BRPM | TEMPERATURE: 98.2 F | HEART RATE: 98 BPM | DIASTOLIC BLOOD PRESSURE: 56 MMHG | OXYGEN SATURATION: 96 % | SYSTOLIC BLOOD PRESSURE: 106 MMHG

## 2018-04-05 VITALS
SYSTOLIC BLOOD PRESSURE: 91 MMHG | OXYGEN SATURATION: 100 % | DIASTOLIC BLOOD PRESSURE: 58 MMHG | RESPIRATION RATE: 14 BRPM | HEART RATE: 102 BPM | TEMPERATURE: 98.1 F

## 2018-04-05 VITALS — BODY MASS INDEX: 27.29 KG/M2 | HEIGHT: 64 IN | WEIGHT: 159.84 LBS

## 2018-04-05 DIAGNOSIS — N20.0: Primary | ICD-10-CM

## 2018-04-05 DIAGNOSIS — E78.00: ICD-10-CM

## 2018-04-05 DIAGNOSIS — I10: ICD-10-CM

## 2018-04-05 LAB
BUN SERPL-MCNC: 6 MG/DL (ref 7–18)
CALCIUM SERPL-MCNC: 10.5 MG/DL (ref 8.5–10.1)
CHLORIDE SERPL-SCNC: 107 MEQ/L (ref 98–107)
CREAT SERPL-MCNC: 0.68 MG/DL (ref 0.5–1)
ERYTHROCYTE [DISTWIDTH] IN BLOOD BY AUTOMATED COUNT: 17.6 % (ref 11.6–17.2)
GFR SERPLBLD BASED ON 1.73 SQ M-ARVRAT: 103 ML/MIN (ref 89–?)
GLUCOSE SERPL-MCNC: 140 MG/DL (ref 74–106)
HCO3 BLD-SCNC: 24.3 MEQ/L (ref 21–32)
HCT VFR BLD CALC: 32.2 % (ref 35–46)
HGB BLD-MCNC: 10.1 GM/DL (ref 11.6–15.3)
MCH RBC QN AUTO: 25 PG (ref 27–34)
MCHC RBC AUTO-ENTMCNC: 31.5 % (ref 32–36)
MCV RBC AUTO: 79.5 FL (ref 80–100)
PLATELET # BLD: 321 TH/MM3 (ref 150–450)
PMV BLD AUTO: 7.9 FL (ref 7–11)
RBC # BLD AUTO: 4.06 MIL/MM3 (ref 4–5.3)
SODIUM SERPL-SCNC: 139 MEQ/L (ref 136–145)
WBC # BLD AUTO: 11.9 TH/MM3 (ref 4–11)

## 2018-04-05 PROCEDURE — 74425 UROGRAPHY ANTEGRADE RS&I: CPT

## 2018-04-05 PROCEDURE — C1726 CATH, BAL DIL, NON-VASCULAR: HCPCS

## 2018-04-05 PROCEDURE — 85027 COMPLETE CBC AUTOMATED: CPT

## 2018-04-05 PROCEDURE — 74018 RADEX ABDOMEN 1 VIEW: CPT

## 2018-04-05 PROCEDURE — BT1F1ZZ FLUOROSCOPY OF LEFT KIDNEY, URETER AND BLADDER USING LOW OSMOLAR CONTRAST: ICD-10-PCS

## 2018-04-05 PROCEDURE — 76000 FLUOROSCOPY <1 HR PHYS/QHP: CPT

## 2018-04-05 PROCEDURE — 94150 VITAL CAPACITY TEST: CPT

## 2018-04-05 PROCEDURE — C1769 GUIDE WIRE: HCPCS

## 2018-04-05 PROCEDURE — 80048 BASIC METABOLIC PNL TOTAL CA: CPT

## 2018-04-05 PROCEDURE — 0T743DZ DILATION OF LEFT KIDNEY PELVIS WITH INTRALUMINAL DEVICE, PERCUTANEOUS APPROACH: ICD-10-PCS

## 2018-04-05 PROCEDURE — 0TC13ZZ EXTIRPATION OF MATTER FROM LEFT KIDNEY, PERCUTANEOUS APPROACH: ICD-10-PCS

## 2018-04-05 PROCEDURE — 74420 UROGRAPHY RTRGR +-KUB: CPT

## 2018-04-05 RX ADMIN — ATORVASTATIN CALCIUM SCH MG: 20 TABLET, FILM COATED ORAL at 20:48

## 2018-04-05 RX ADMIN — OXYTOCIN SCH MLS/HR: 10 INJECTION, SOLUTION INTRAMUSCULAR; INTRAVENOUS at 14:05

## 2018-04-05 RX ADMIN — OXYCODONE HYDROCHLORIDE AND ACETAMINOPHEN PRN TAB: 7.5; 325 TABLET ORAL at 18:38

## 2018-04-05 NOTE — RADRPT
EXAM DATE/TIME:  04/05/2018 09:13 

 

[facility] COMPARISON:     

No previous studies available for comparison.

 

                                         

INDICATIONS :     

Staghorn kidney stone removal right kidney and stent placement.

                                         

   6 minutes                                           

   2                                                   

                                         

 

CONTRAST:     

Instilled by Ordering Physician

                                         

 

MEDICAL HISTORY :     

Hypertension.       Pyelonephritis,Dyslopidemia,Septic shock,UTI,Anemia   

 

SURGICAL HISTORY :     

Tubal ligation.   

 

ENCOUNTER:     

Subsequent                                        

ACUITY:     3 days      

PAIN SCORE:     Non-responsive.

LOCATION:     Right  Kidney. 

 

FINDINGS:     

The proximal portion of a double-J stent is present in the renal pelvis. No renal stones are identifi
ed.

 

CONCLUSION:     

1. Postsurgical changes as above.

 

 

 

 Jono Black MD on April 05, 2018 at 15:59           

Board Certified Radiologist.

 This report was verified electronically.

## 2018-04-05 NOTE — PD.OP
__________________________________________________





Operative Report


Date of Surgery:  Apr 5, 2018


Preoperative Diagnosis:  


Left staghorn calculus


Postoperative Diagnosis:  


Same


Procedure:


Left percutaneous nephrolithotomy with left antegrade pyelogram with stent and 

insertion and stone extraction


Anesthesia:


RYANNE


Surgeon:


Rob Mazariegos


Assistant(s):


None


Resident Surgeon:


None


Operation and Findings:


72-year-old female with a history of a large left staghorn calculus with 

pyelonephritis and sepsis in the past.  She underwent left percutaneous 

nephrostomy tubes placement with nephroureteral stent by interventional 

radiology.  She was treated with IV antibiotics and then p.o. antibiotics and 

sent back to the nursing home.  She presented today to undergo left 

percutaneous nephrolithotomy.  The patient was brought to the operating room 

and placed on the operating room table in the prone position.  She received 

general endotracheal tube anesthesia, preprocedure antibiotics and was prepped 

and draped in usual sterile fashion.  All areas were appropriately padded with 

Gelfoam pads and other supportive padding to the upper extremities and lower 

extremities.  Fluoroscopic imaging was used to identify the area of the kidney 

with a large staghorn calculus.  The nephrostomy tube was removed after a 

Amplatz super stiff wire was passed through the nephrostomy tube and down into 

the bladder.  The nephrostomy tube was then removed.  Using the UroMax nephro 

dilator the balloon was inflated to a pressure of 12 and allowed to sit for 1 

minute with the tip of the balloon near the area of the UPJ.  This provided a 

track to be dilated and then the Julius sheath was then passed over the balloon 

left in good position in the kidney.  The stone was visualized once the scope 

was placed into the kidney.  Using the cyber 1 the stone was then fragmented 

and the fragmented chips were then sucked through the suction vacuum.  Stone 

was noted to be soft.  I was able to retrieve 95% of the entire staghorn 

calculus but was unable to enter a lower pole calyx where a residual stone 

fragment was present.  Once the rest of the entire kidney was clear of stone 

burden and antegrade pyelogram was performed through the nephroscope through a 

5 [place] open-ended catheter.  A 0.35 sensor wire was then passed through the 

open-ended catheter down to the bladder.  A 22 cm 6 [place] double-J stent was 

placed in good position with a good curl in the kidney and the bladder.  

FloSeal was then placed into the nephroscope tract.  A 18 [place] tip 

catheter was then passed into the collecting system with 5 cc placed into the 

balloon.  This was then anchored with a 2-0 silk suture.  Approximately 90-95% 

of the entire stone burden was cleared with some residual stone left in the 

left lower pole.  She was awoken, extubated, and transferred to recovery room 

in stable condition.  She tolerated the procedure well.  Blood loss was 

approximately 250 cc.  She will require left extracorporeal shockwave 

lithotripsy in 4-6 weeks to treat the residual stone burden.











Rob Mazariegos DO Apr 5, 2018 13:05

## 2018-04-05 NOTE — RADRPT
EXAM DATE/TIME:  04/05/2018 09:13 

 

[facility] COMPARISON:     

No previous studies available for comparison.

 

                     

INDICATIONS :     

Percutaneous nephro lithotripsy right kidney.

                     

 

MEDICAL HISTORY :     

Hypertension.       Pyelonephritis,Dyslopidemia,Septic shock,UTI,Anemia   

 

SURGICAL HISTORY :     

Tubal ligation.   

 

ENCOUNTER:     

Subsequent                                        

 

ACUITY:     

3 days      

 

PAIN SCORE:     

Non-responsive.

 

LOCATION:     

Right  Kidney.

 

FINDINGS:     

A sheath is present in the right kidney with a wire extending down the ureter. Contrast is seen in th
e collecting system.

 

CONCLUSION:     

Postsurgical changes as above.

 

 

 

 Jono Black MD on April 05, 2018 at 15:59           

Board Certified Radiologist.

 This report was verified electronically.

## 2018-04-06 VITALS
TEMPERATURE: 98 F | HEART RATE: 74 BPM | OXYGEN SATURATION: 99 % | SYSTOLIC BLOOD PRESSURE: 110 MMHG | RESPIRATION RATE: 14 BRPM | DIASTOLIC BLOOD PRESSURE: 52 MMHG

## 2018-04-06 VITALS
TEMPERATURE: 98.6 F | OXYGEN SATURATION: 98 % | SYSTOLIC BLOOD PRESSURE: 101 MMHG | RESPIRATION RATE: 18 BRPM | HEART RATE: 84 BPM | DIASTOLIC BLOOD PRESSURE: 68 MMHG

## 2018-04-06 VITALS
SYSTOLIC BLOOD PRESSURE: 109 MMHG | OXYGEN SATURATION: 98 % | RESPIRATION RATE: 14 BRPM | TEMPERATURE: 98 F | DIASTOLIC BLOOD PRESSURE: 57 MMHG | HEART RATE: 78 BPM

## 2018-04-06 VITALS
OXYGEN SATURATION: 99 % | SYSTOLIC BLOOD PRESSURE: 121 MMHG | RESPIRATION RATE: 17 BRPM | HEART RATE: 91 BPM | DIASTOLIC BLOOD PRESSURE: 64 MMHG | TEMPERATURE: 99.2 F

## 2018-04-06 VITALS
TEMPERATURE: 97.1 F | RESPIRATION RATE: 16 BRPM | HEART RATE: 72 BPM | DIASTOLIC BLOOD PRESSURE: 84 MMHG | SYSTOLIC BLOOD PRESSURE: 121 MMHG | OXYGEN SATURATION: 99 %

## 2018-04-06 VITALS
TEMPERATURE: 98.8 F | RESPIRATION RATE: 16 BRPM | OXYGEN SATURATION: 97 % | HEART RATE: 98 BPM | SYSTOLIC BLOOD PRESSURE: 101 MMHG | DIASTOLIC BLOOD PRESSURE: 59 MMHG

## 2018-04-06 LAB
BUN SERPL-MCNC: 8 MG/DL (ref 7–18)
CALCIUM SERPL-MCNC: 9.9 MG/DL (ref 8.5–10.1)
CHLORIDE SERPL-SCNC: 105 MEQ/L (ref 98–107)
CREAT SERPL-MCNC: 0.9 MG/DL (ref 0.5–1)
ERYTHROCYTE [DISTWIDTH] IN BLOOD BY AUTOMATED COUNT: 16.9 % (ref 11.6–17.2)
GFR SERPLBLD BASED ON 1.73 SQ M-ARVRAT: 74 ML/MIN (ref 89–?)
GLUCOSE SERPL-MCNC: 128 MG/DL (ref 74–106)
HCO3 BLD-SCNC: 26.7 MEQ/L (ref 21–32)
HCT VFR BLD CALC: 28.7 % (ref 35–46)
HGB BLD-MCNC: 9.3 GM/DL (ref 11.6–15.3)
MCH RBC QN AUTO: 25.5 PG (ref 27–34)
MCHC RBC AUTO-ENTMCNC: 32.3 % (ref 32–36)
MCV RBC AUTO: 79 FL (ref 80–100)
PLATELET # BLD: 280 TH/MM3 (ref 150–450)
PMV BLD AUTO: 7.2 FL (ref 7–11)
RBC # BLD AUTO: 3.63 MIL/MM3 (ref 4–5.3)
SODIUM SERPL-SCNC: 139 MEQ/L (ref 136–145)
WBC # BLD AUTO: 10.7 TH/MM3 (ref 4–11)

## 2018-04-06 RX ADMIN — OXYTOCIN SCH MLS/HR: 10 INJECTION, SOLUTION INTRAMUSCULAR; INTRAVENOUS at 02:58

## 2018-04-06 RX ADMIN — OXYCODONE HYDROCHLORIDE AND ACETAMINOPHEN PRN TAB: 7.5; 325 TABLET ORAL at 11:37

## 2018-04-06 RX ADMIN — OXYTOCIN SCH MLS/HR: 10 INJECTION, SOLUTION INTRAMUSCULAR; INTRAVENOUS at 09:15

## 2018-04-06 RX ADMIN — ATORVASTATIN CALCIUM SCH MG: 20 TABLET, FILM COATED ORAL at 21:40

## 2018-04-06 NOTE — HHI.PR
Subjective


Patient symptoms today


Pt seen and examined. Feels well. Some pain.





Objective


Vital Signs





Vital Signs








  Date Time  Temp Pulse Resp B/P (MAP) Pulse Ox O2 Delivery O2 Flow Rate FiO2


 


4/6/18 04:00 98.0 74 14 110/52 (71) 99   


 


4/6/18 00:00 98.0 78 14 109/57 (74) 98   


 


4/5/18 20:41   18     


 


4/5/18 20:00 98.1 102 14 91/58 (69) 100   


 


4/5/18 16:30 98.2 98 16 106/56 (73) 96   


 


4/5/18 16:00  82 16 109/65 (80) 98 Room Air  


 


4/5/18 15:30  86 16 113/65 (81) 98 Room Air  


 


4/5/18 15:00  82 16 115/63 (80) 99   


 


4/5/18 14:30  84 16 123/77 (92) 99 Nasal Cannula 2 


 


4/5/18 14:15  84 16 137/74 (95) 100 Nasal Cannula 2 


 


4/5/18 14:00  76 16 117/65 (82) 99 Nasal Cannula 2 


 


4/5/18 13:45  70 16 106/59 (75) 100 Nasal Cannula 2 


 


4/5/18 13:30  70 16 108/62 (77) 100 Nasal Cannula 2 


 


4/5/18 13:26 97.8 74 16 117/67 (84) 100 Nasal Cannula 2 














Intake & Output  


 


 4/6/18 4/6/18





 07:00 19:00


 


Intake Total 120 ml 


 


Output Total 750 ml 


 


Balance -630 ml 


 


  


 


Intake Oral 120 ml 


 


Output Urine Total 450 ml 


 


Drainage Total 300 ml 


 


# Bowel Movements 0 








Result Diagram:  


4/6/18 0603 4/6/18 0603





Objective Remarks


Abd:soft,nt,nd


Ortega: urine clear


NT: urine blood tinged


Medications and IVs





Current Medications








 Medications


  (Trade)  Dose


 Ordered  Sig/[place]


 Route  Start Time


 Stop Time Status Last Admin


 


 Lactated Ringer's  1,000 ml @ 


 100 mls/hr  Q10H


 IV  4/5/18 13:15


    4/6/18 02:58


 


 


 Cefazolin Sodium


 1000 mg/Sodium


 Chloride  100 ml @ 


 200 mls/hr  Q8H


 IV  4/5/18 16:00


 4/6/18 08:29  4/6/18 00:11


 


 


  (Dilaudid Pf Inj)  0.5 mg  Q3H  PRN


 IV PUSH  4/5/18 13:15


     


 


 


  (Zofran Inj)  4 mg  Q6H  PRN


 IV PUSH  4/5/18 13:15


     


 


 


  (Tylenol 650 Mg/


 20 ml Liq)  650 mg  Q6H  PRN


 PO  4/5/18 13:15


     


 


 


  (Lipitor)  20 mg  HS


 PO  4/5/18 21:00


    4/5/18 20:48


 


 


  (Percocet


 7.5-325 Mg)  1 tab  Q6H  PRN


 PO  4/5/18 13:15


    4/5/18 18:38


 


 


  (Restoril)  15 mg  HS  PRN


 PO  4/5/18 13:15


     


 


 


  (Albuterol Neb)  2.5 mg  Q6HR NEB  PRN


 NEB  4/5/18 13:15


     


 


 


 Miscellaneous


 Information  ALL


 NURSING


 DEPARTME...  UNSCH  PRN


 .XX  4/5/18 13:30


 4/6/18 13:29   


 











Assessment and Plan


Assessment and Plan


Stable s/p Right PCNL POD#1


OOB to chair/PT consult today











Rob Mazariegos DO Apr 6, 2018 08:27

## 2018-04-06 NOTE — RADRPT
EXAM DATE/TIME:  04/06/2018 10:08 

 

[facility] COMPARISON:     

CT SIMULATION, March 29, 2018, 14:07.  ABDOMEN SINGLE VIEW, April 05, 2018, 9:13.  ABDOMEN KUB ONLY, 
January 19, 2018, 16:36.

 

                     

INDICATIONS :     

Post right side percutaneous nephrolithotomy

                     

 

MEDICAL HISTORY :     

Hypertension.       staghorn stone on right, PCNLm, pyelonephritis, anemia   

 

SURGICAL HISTORY :     

Tubal ligation.   PCNL, right ureteral stent, right nephrostomy tube

 

ENCOUNTER:     

Subsequent                                        

 

ACUITY:     

2 days      

 

PAIN SCORE:     

Non-responsive.

 

LOCATION:     

Bilateral  abdomen

 

FINDINGS:     

2 supine frontal views of the abdomen reveal a right-sided nephrostomy tube. There is also a right-si
ded ureteral stent. A 2.1 cm calcification projects over the expected position of the lower pole of t
he right kidney. The renal contour is obscured. Large degenerating uterine fibroids obscure much of t
he pelvis. No discrete calcifications projecting along the course of the double-J stent. There are po
rtions that are totally obscured by the uterine fibroids. Gas-filled loops of nondilated large and sm
all bowel in an ileus type pattern.

 

CONCLUSION:     

1. 2.1 cm right renal stone.

2. No discernible stone along the course of the double-J stent, however, there are areas obscured by 
the large calcified fibroids.

3. Ileus type bowel gas pattern.

 

 

 

 Yair Rogers Jr., MD on April 06, 2018 at 12:12           

Board Certified Radiologist.

 This report was verified electronically.

## 2018-04-07 VITALS
OXYGEN SATURATION: 96 % | DIASTOLIC BLOOD PRESSURE: 78 MMHG | RESPIRATION RATE: 17 BRPM | TEMPERATURE: 98.4 F | HEART RATE: 93 BPM | SYSTOLIC BLOOD PRESSURE: 130 MMHG

## 2018-04-07 VITALS
HEART RATE: 71 BPM | DIASTOLIC BLOOD PRESSURE: 81 MMHG | OXYGEN SATURATION: 98 % | SYSTOLIC BLOOD PRESSURE: 120 MMHG | RESPIRATION RATE: 16 BRPM | TEMPERATURE: 97.9 F

## 2018-04-07 VITALS
DIASTOLIC BLOOD PRESSURE: 70 MMHG | SYSTOLIC BLOOD PRESSURE: 144 MMHG | OXYGEN SATURATION: 98 % | HEART RATE: 85 BPM | TEMPERATURE: 98.3 F | RESPIRATION RATE: 18 BRPM

## 2018-04-07 VITALS
TEMPERATURE: 98 F | HEART RATE: 75 BPM | DIASTOLIC BLOOD PRESSURE: 69 MMHG | RESPIRATION RATE: 17 BRPM | OXYGEN SATURATION: 99 % | SYSTOLIC BLOOD PRESSURE: 142 MMHG

## 2018-04-07 VITALS
RESPIRATION RATE: 17 BRPM | TEMPERATURE: 98.4 F | OXYGEN SATURATION: 99 % | SYSTOLIC BLOOD PRESSURE: 122 MMHG | DIASTOLIC BLOOD PRESSURE: 70 MMHG | HEART RATE: 75 BPM

## 2018-04-07 RX ADMIN — OXYTOCIN SCH MLS/HR: 10 INJECTION, SOLUTION INTRAMUSCULAR; INTRAVENOUS at 07:57

## 2018-04-07 RX ADMIN — OXYCODONE HYDROCHLORIDE AND ACETAMINOPHEN PRN TAB: 7.5; 325 TABLET ORAL at 20:28

## 2018-04-07 RX ADMIN — OXYTOCIN SCH MLS/HR: 10 INJECTION, SOLUTION INTRAMUSCULAR; INTRAVENOUS at 13:34

## 2018-04-07 RX ADMIN — OXYTOCIN SCH MLS/HR: 10 INJECTION, SOLUTION INTRAMUSCULAR; INTRAVENOUS at 20:11

## 2018-04-07 RX ADMIN — ATORVASTATIN CALCIUM SCH MG: 20 TABLET, FILM COATED ORAL at 20:11

## 2018-04-07 RX ADMIN — OXYCODONE HYDROCHLORIDE AND ACETAMINOPHEN PRN TAB: 7.5; 325 TABLET ORAL at 07:56

## 2018-04-07 NOTE — HHI.PR
Subjective


Patient symptoms today


Pt seen and examined. Feeling better. Pain improved. Tolerating diet.





Objective


Vital Signs





Vital Signs








  Date Time  Temp Pulse Resp B/P (MAP) Pulse Ox O2 Delivery O2 Flow Rate FiO2


 


4/7/18 12:00 98.0 75 17 142/69 (93) 99   


 


4/7/18 08:00 98.4 75 17 122/70 (87) 99   


 


4/7/18 00:00 97.9 71 16 120/81 (94) 98   


 


4/6/18 20:00 97.1 72 16 121/84 (96) 99   


 


4/6/18 16:00 98.8 98 16 101/59 (73) 97   














Intake & Output  


 


 4/7/18 4/7/18





 07:00 19:00


 


Intake Total 240 ml 


 


Output Total 1050 ml 


 


Balance -810 ml 


 


  


 


Intake Oral 240 ml 


 


Output Urine Total 450 ml 


 


Drainage Total 600 ml 


 


# Bowel Movements 0 








Result Diagram:  


4/6/18 0603                                                                    

            4/6/18 0603





Objective Remarks


Abd:soft,nt,nd


Ortega: urine clear


NT: urine blood tinged





4/7


Abd:soft,nt,nd


Ortega out and voiding.


Right PCNT: urine is clear in tube.


Ext: neg C/C/E


Medications and IVs





Current Medications








 Medications


  (Trade)  Dose


 Ordered  Sig/[place]


 Route  Start Time


 Stop Time Status Last Admin


 


 Lactated Ringer's  1,000 ml @ 


 100 mls/hr  Q10H


 IV  4/5/18 13:15


    4/7/18 07:57


 


 


  (Dilaudid Pf Inj)  0.5 mg  Q3H  PRN


 IV PUSH  4/5/18 13:15


     


 


 


  (Zofran Inj)  4 mg  Q6H  PRN


 IV PUSH  4/5/18 13:15


     


 


 


  (Tylenol 650 Mg/


 20 ml Liq)  650 mg  Q6H  PRN


 PO  4/5/18 13:15


     


 


 


  (Lipitor)  20 mg  HS


 PO  4/5/18 21:00


    4/6/18 21:40


 


 


  (Percocet


 7.5-325 Mg)  1 tab  Q6H  PRN


 PO  4/5/18 13:15


    4/7/18 07:56


 


 


  (Restoril)  15 mg  HS  PRN


 PO  4/5/18 13:15


     


 


 


  (Albuterol Neb)  2.5 mg  Q6HR NEB  PRN


 NEB  4/5/18 13:15


     


 











Assessment and Plan


Assessment and Plan


Stable s/p Right PCNL POD#1


OOB to chair/PT consult today





4/7


Stable s/p Right PCNL POD#2


PT consult


Plug Right PCNT.











Rob Mazariegos DO Apr 7, 2018 12:46

## 2018-04-08 VITALS
TEMPERATURE: 98 F | DIASTOLIC BLOOD PRESSURE: 67 MMHG | HEART RATE: 85 BPM | SYSTOLIC BLOOD PRESSURE: 154 MMHG | RESPIRATION RATE: 17 BRPM | OXYGEN SATURATION: 96 %

## 2018-04-08 VITALS
SYSTOLIC BLOOD PRESSURE: 162 MMHG | RESPIRATION RATE: 18 BRPM | TEMPERATURE: 97.8 F | DIASTOLIC BLOOD PRESSURE: 76 MMHG | HEART RATE: 86 BPM | OXYGEN SATURATION: 97 %

## 2018-04-08 VITALS
OXYGEN SATURATION: 95 % | HEART RATE: 79 BPM | RESPIRATION RATE: 18 BRPM | DIASTOLIC BLOOD PRESSURE: 67 MMHG | SYSTOLIC BLOOD PRESSURE: 123 MMHG | TEMPERATURE: 98.2 F

## 2018-04-08 VITALS
HEART RATE: 97 BPM | DIASTOLIC BLOOD PRESSURE: 75 MMHG | SYSTOLIC BLOOD PRESSURE: 141 MMHG | RESPIRATION RATE: 18 BRPM | OXYGEN SATURATION: 97 % | TEMPERATURE: 97.9 F

## 2018-04-08 VITALS
DIASTOLIC BLOOD PRESSURE: 74 MMHG | OXYGEN SATURATION: 99 % | SYSTOLIC BLOOD PRESSURE: 141 MMHG | TEMPERATURE: 98 F | HEART RATE: 81 BPM | RESPIRATION RATE: 18 BRPM

## 2018-04-08 RX ADMIN — OXYTOCIN SCH MLS/HR: 10 INJECTION, SOLUTION INTRAMUSCULAR; INTRAVENOUS at 11:43

## 2018-04-08 RX ADMIN — OXYCODONE HYDROCHLORIDE AND ACETAMINOPHEN PRN TAB: 7.5; 325 TABLET ORAL at 17:19

## 2018-04-08 RX ADMIN — ATORVASTATIN CALCIUM SCH MG: 20 TABLET, FILM COATED ORAL at 19:39

## 2018-04-08 RX ADMIN — OXYTOCIN SCH MLS/HR: 10 INJECTION, SOLUTION INTRAMUSCULAR; INTRAVENOUS at 21:15

## 2018-04-08 RX ADMIN — OXYCODONE HYDROCHLORIDE AND ACETAMINOPHEN PRN TAB: 7.5; 325 TABLET ORAL at 09:43

## 2018-04-08 NOTE — HHI.PR
Subjective


Patient symptoms today


Pt seen and examined. Feels well.





Objective


Vital Signs





Vital Signs








  Date Time  Temp Pulse Resp B/P (MAP) Pulse Ox O2 Delivery O2 Flow Rate FiO2


 


4/8/18 12:00 97.8 86 18 162/76 (104) 97   


 


4/8/18 08:00 98.0 81 18 141/74 (96) 99   


 


4/8/18 00:00 98.2 79 18 123/67 (85) 95   


 


4/7/18 20:00 98.4 93 17 130/78 (95) 96   


 


4/7/18 16:00 98.3 85 18 144/70 (94) 98   














Intake & Output  


 


 4/8/18 4/8/18





 07:00 19:00


 


Intake Total 2140 ml 


 


Balance 2140 ml 


 


  


 


Intake Oral 240 ml 


 


IV Total 1900 ml 


 


# Voids 4 








Result Diagram:  


4/6/18 0603                                                                    

            4/6/18 0603





Objective Remarks


Abd:soft,nt,nd


Ortega: urine clear


NT: urine blood tinged





4/7


Abd:soft,nt,nd


Ortega out and voiding.


Right PCNT: urine is clear in tube.


Ext: neg C/C/E





4/8


Abd:soft,nt,nd


Right PCNT: clamped


Voiding well.


Medications and IVs





Current Medications








 Medications


  (Trade)  Dose


 Ordered  Sig/[place]


 Route  Start Time


 Stop Time Status Last Admin


 


 Lactated Ringer's  1,000 ml @ 


 100 mls/hr  Q10H


 IV  4/5/18 13:15


    4/8/18 11:43


 


 


  (Dilaudid Pf Inj)  0.5 mg  Q3H  PRN


 IV PUSH  4/5/18 13:15


     


 


 


  (Zofran Inj)  4 mg  Q6H  PRN


 IV PUSH  4/5/18 13:15


     


 


 


  (Tylenol 650 Mg/


 20 ml Liq)  650 mg  Q6H  PRN


 PO  4/5/18 13:15


     


 


 


  (Lipitor)  20 mg  HS


 PO  4/5/18 21:00


    4/7/18 20:11


 


 


  (Percocet


 7.5-325 Mg)  1 tab  Q6H  PRN


 PO  4/5/18 13:15


    4/8/18 09:43


 


 


  (Restoril)  15 mg  HS  PRN


 PO  4/5/18 13:15


     


 


 


  (Albuterol Neb)  2.5 mg  Q6HR NEB  PRN


 NEB  4/5/18 13:15


     


 











Assessment and Plan


Assessment and Plan


Stable s/p Right PCNL POD#1


OOB to chair/PT consult today





4/7


Stable s/p Right PCNL POD#2


PT consult


Plug Right PCNT.





4/8


Stable s/p Right PCNL


OOB


D/C to NH in [facility] DO Apr 8, 2018 12:41

## 2018-04-09 VITALS
SYSTOLIC BLOOD PRESSURE: 146 MMHG | TEMPERATURE: 98.5 F | HEART RATE: 86 BPM | OXYGEN SATURATION: 95 % | DIASTOLIC BLOOD PRESSURE: 73 MMHG | RESPIRATION RATE: 19 BRPM

## 2018-04-09 VITALS
SYSTOLIC BLOOD PRESSURE: 147 MMHG | DIASTOLIC BLOOD PRESSURE: 78 MMHG | TEMPERATURE: 97 F | HEART RATE: 86 BPM | RESPIRATION RATE: 19 BRPM | OXYGEN SATURATION: 96 %

## 2018-04-09 VITALS
OXYGEN SATURATION: 97 % | HEART RATE: 74 BPM | DIASTOLIC BLOOD PRESSURE: 82 MMHG | SYSTOLIC BLOOD PRESSURE: 150 MMHG | TEMPERATURE: 97.9 F | RESPIRATION RATE: 19 BRPM

## 2018-04-09 VITALS
TEMPERATURE: 97.4 F | SYSTOLIC BLOOD PRESSURE: 148 MMHG | RESPIRATION RATE: 17 BRPM | DIASTOLIC BLOOD PRESSURE: 64 MMHG | OXYGEN SATURATION: 96 % | HEART RATE: 82 BPM

## 2018-04-09 RX ADMIN — OXYCODONE HYDROCHLORIDE AND ACETAMINOPHEN PRN TAB: 7.5; 325 TABLET ORAL at 04:29

## 2018-04-09 NOTE — HHI.PR
Subjective


Patient symptoms today


Pt seen and examined. Feels well.





Objective


Vital Signs





Vital Signs








  Date Time  Temp Pulse Resp B/P (MAP) Pulse Ox O2 Delivery O2 Flow Rate FiO2


 


4/9/18 08:00 97.9 74 19 150/82 (104) 97   


 


4/9/18 00:00 97.4 82 17 148/64 (92) 96   


 


4/8/18 20:00 98.0 85 17 154/67 (96) 96   


 


4/8/18 16:00 97.9 97 18 141/75 (97) 97   


 


4/8/18 12:00 97.8 86 18 162/76 (104) 97   














Intake & Output  


 


 4/9/18 4/9/18





 07:00 19:00


 


Intake Total 2125 ml 120 ml


 


Balance 2125 ml 120 ml


 


  


 


Intake Oral 240 ml 120 ml


 


IV Total 1885 ml 


 


# Voids 4 








Result Diagram:  


4/6/18 0603                                                                    

            4/6/18 0603





Objective Remarks


Abd:soft,nt,nd


Ortega: urine clear


NT: urine blood tinged





4/7


Abd:soft,nt,nd


Ortega out and voiding.


Right PCNT: urine is clear in tube.


Ext: neg C/C/E





4/8


Abd:soft,nt,nd


Right PCNT: clamped


Voiding well.





4/9


Abd:soft,nt,nd


Right PCNT: clamped


Voiding well


Medications and IVs





Current Medications








 Medications


  (Trade)  Dose


 Ordered  Sig/[place]


 Route  Start Time


 Stop Time Status Last Admin


 


 Lactated Ringer's  1,000 ml @ 


 100 mls/hr  Q10H


 IV  4/5/18 13:15


    4/8/18 11:43


 


 


  (Dilaudid Pf Inj)  0.5 mg  Q3H  PRN


 IV PUSH  4/5/18 13:15


     


 


 


  (Zofran Inj)  4 mg  Q6H  PRN


 IV PUSH  4/5/18 13:15


     


 


 


  (Tylenol 650 Mg/


 20 ml Liq)  650 mg  Q6H  PRN


 PO  4/5/18 13:15


     


 


 


  (Lipitor)  20 mg  HS


 PO  4/5/18 21:00


    4/8/18 19:39


 


 


  (Percocet


 7.5-325 Mg)  1 tab  Q6H  PRN


 PO  4/5/18 13:15


    4/9/18 04:29


 


 


  (Restoril)  15 mg  HS  PRN


 PO  4/5/18 13:15


     


 


 


  (Albuterol Neb)  2.5 mg  Q6HR NEB  PRN


 NEB  4/5/18 13:15


     


 











Assessment and Plan


Assessment and Plan


Stable s/p Right PCNL POD#1


OOB to chair/PT consult today





4/7


Stable s/p Right PCNL POD#2


PT consult


Plug Right PCNT.





4/8


Stable s/p Right PCNL


OOB


D/C to NH in AM





4/9


Stable s/p Right PCNL


D/C to [facility]


F/U Friday for tube removal in office











Rob Mazariegos DO Apr 9, 2018 08:52

## 2018-04-28 ENCOUNTER — HOSPITAL ENCOUNTER (INPATIENT)
Dept: HOSPITAL 17 - NEPC | Age: 73
LOS: 15 days | Discharge: NURSING, REHAB OR LONG-TERM CARE | DRG: 757 | End: 2018-05-13
Attending: HOSPITALIST | Admitting: HOSPITALIST
Payer: MEDICARE

## 2018-04-28 DIAGNOSIS — Z87.891: ICD-10-CM

## 2018-04-28 DIAGNOSIS — F03.90: ICD-10-CM

## 2018-04-28 DIAGNOSIS — Z87.442: ICD-10-CM

## 2018-04-28 DIAGNOSIS — K57.90: ICD-10-CM

## 2018-04-28 DIAGNOSIS — R59.9: ICD-10-CM

## 2018-04-28 DIAGNOSIS — I25.10: ICD-10-CM

## 2018-04-28 DIAGNOSIS — Z87.440: ICD-10-CM

## 2018-04-28 DIAGNOSIS — G93.49: ICD-10-CM

## 2018-04-28 DIAGNOSIS — E83.52: ICD-10-CM

## 2018-04-28 DIAGNOSIS — E46: ICD-10-CM

## 2018-04-28 DIAGNOSIS — E78.5: ICD-10-CM

## 2018-04-28 DIAGNOSIS — G47.00: ICD-10-CM

## 2018-04-28 DIAGNOSIS — I10: ICD-10-CM

## 2018-04-28 DIAGNOSIS — D25.9: ICD-10-CM

## 2018-04-28 DIAGNOSIS — N13.2: ICD-10-CM

## 2018-04-28 DIAGNOSIS — D64.9: ICD-10-CM

## 2018-04-28 DIAGNOSIS — T83.122A: ICD-10-CM

## 2018-04-28 DIAGNOSIS — E86.0: ICD-10-CM

## 2018-04-28 DIAGNOSIS — E27.9: ICD-10-CM

## 2018-04-28 DIAGNOSIS — C53.0: ICD-10-CM

## 2018-04-28 DIAGNOSIS — B37.49: Primary | ICD-10-CM

## 2018-04-28 DIAGNOSIS — K21.9: ICD-10-CM

## 2018-04-28 DIAGNOSIS — N17.9: ICD-10-CM

## 2018-04-28 DIAGNOSIS — E83.39: ICD-10-CM

## 2018-04-28 DIAGNOSIS — Z51.5: ICD-10-CM
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
## 2018-04-28 LAB
ALBUMIN: 2.7 GM/DL (ref 3.4–5)
ALKALINE PHOSPHATASE: 93 U/L (ref 45–117)
ALT (GPT): 16 U/L (ref 10–53)
ANION GAP: 11 MEQ/L (ref 5–15)
APTT (PATIENT): 28.7 SEC (ref 24.3–30.1)
AST (GOT): 8 U/L (ref 15–37)
AUTOMATED NEUTROPHIL #: 8 TH/MM3 (ref 1.8–7.7)
BACTERIA, URINE: (no result) /HPF
BASOPHIL #: 0 TH/MM3 (ref 0–0.2)
BASOPHIL %: 0.1 % (ref 0–2)
BICARBONATE: 24.9 MEQ/L (ref 21–32)
BILIRUB SERPL-MCNC: 0.8 MG/DL (ref 0.2–1)
BLOOD UREA NITROGEN: 29 MG/DL (ref 7–18)
BLOOD, URINE: (no result)
CALCIUM TP COR SERPL-MCNC: 11.2 MG/DL (ref 8.5–10.1)
CALCIUM: 12.6 MG/DL (ref 8.5–10.1)
CHLORIDE: 98 MEQ/L (ref 98–107)
COMMENT (UR): (no result)
CREATINE KINASE: (no result) U/L (ref 26–192)
CREATININE: 1.39 MG/DL (ref 0.5–1)
CULTURE IF INDICATED: (no result)
EOSINOPHIL #: 0.1 TH/MM3 (ref 0–0.4)
EOSINOPHIL %: 0.9 % (ref 0–4)
GLOMERULAR FILTRATION RATE: 45 ML/MIN (ref 89–?)
GLUCOSE,RANDOM: 117 MG/DL (ref 74–106)
GLUCOSE,URINE: (no result) MG/DL
HEMATOCRIT: 32.9 % (ref 35–46)
HEMO FLAGS: (no result)
HEMOGLOBIN: 10.6 GM/DL (ref 11.6–15.3)
INTERNATIONAL NORMALIZED RATIO: 1 RATIO
KETONE, URINE: 10 MG/DL
LACTIC ACID SEPSIS PROTOCOL: 1.2 MMOL/L (ref 0.4–2)
LIPASE: 92 U/L (ref 73–393)
LYMPH %: 6.3 % (ref 9–44)
LYMPHOCYTE #: 0.6 TH/MM3 (ref 1–4.8)
MEAN CELL VOLUME: 79 FL (ref 80–100)
MEAN CORPUSCULAR HEMOGLOBIN: 25.4 PG (ref 27–34)
MEAN CORPUSCULAR HGB CONC: 32.2 % (ref 32–36)
MEAN PLATELET VOLUME: 7 FL (ref 7–11)
MONO %: 6.7 % (ref 0–8)
MONOCYTE #: 0.6 TH/MM3 (ref 0–0.9)
NEUT %: 86 % (ref 16–70)
NITRITE,URINE: (no result)
PLATELET COUNT: 242 TH/MM3 (ref 150–450)
POTASSIUM: 4.4 MEQ/L (ref 3.5–5.1)
PROTHROMBIN TIME - PATIENT: 10.5 SEC (ref 9.8–11.6)
RED BLOOD COUNT: 4.17 MIL/MM3 (ref 4–5.3)
RED CELL DISTRIBUTION WIDTH: 17.9 % (ref 11.6–17.2)
SODIUM (NA): 134 MEQ/L (ref 136–145)
SPECIFIC GRAVITY,URINE: 1.02 (ref 1–1.03)
SQUAMOUS EPITHELIAL CELL URINE: 2 /HPF (ref 0–5)
TOTAL PROTEIN: 9.2 GM/DL (ref 6.4–8.2)
TROPONIN I: (no result) NG/ML (ref 0.02–0.05)
URINE COLOR: YELLOW
URINE LEUKOCYTE ESTERASE: (no result)
WHITE BLOOD CELL CLUMPS: (no result)
WHITE BLOOD COUNT: 9.3 TH/MM3 (ref 4–11)

## 2018-04-28 PROCEDURE — 87106 FUNGI IDENTIFICATION YEAST: CPT

## 2018-04-28 PROCEDURE — 80048 BASIC METABOLIC PNL TOTAL CA: CPT

## 2018-04-28 PROCEDURE — 80069 RENAL FUNCTION PANEL: CPT

## 2018-04-28 PROCEDURE — 99152 MOD SED SAME PHYS/QHP 5/>YRS: CPT

## 2018-04-28 PROCEDURE — 83690 ASSAY OF LIPASE: CPT

## 2018-04-28 PROCEDURE — 87040 BLOOD CULTURE FOR BACTERIA: CPT

## 2018-04-28 PROCEDURE — 86920 COMPATIBILITY TEST SPIN: CPT

## 2018-04-28 PROCEDURE — 80053 COMPREHEN METABOLIC PANEL: CPT

## 2018-04-28 PROCEDURE — 74420 UROGRAPHY RTRGR +-KUB: CPT

## 2018-04-28 PROCEDURE — 97110 THERAPEUTIC EXERCISES: CPT

## 2018-04-28 PROCEDURE — 84484 ASSAY OF TROPONIN QUANT: CPT

## 2018-04-28 PROCEDURE — 85025 COMPLETE CBC W/AUTO DIFF WBC: CPT

## 2018-04-28 PROCEDURE — 86850 RBC ANTIBODY SCREEN: CPT

## 2018-04-28 PROCEDURE — 87086 URINE CULTURE/COLONY COUNT: CPT

## 2018-04-28 PROCEDURE — 85610 PROTHROMBIN TIME: CPT

## 2018-04-28 PROCEDURE — 77412 RADIATION TX DELIVERY LVL 3: CPT

## 2018-04-28 PROCEDURE — 83605 ASSAY OF LACTIC ACID: CPT

## 2018-04-28 PROCEDURE — 81001 URINALYSIS AUTO W/SCOPE: CPT

## 2018-04-28 PROCEDURE — 99285 EMERGENCY DEPT VISIT HI MDM: CPT

## 2018-04-28 PROCEDURE — 82550 ASSAY OF CK (CPK): CPT

## 2018-04-28 PROCEDURE — 86900 BLOOD TYPING SEROLOGIC ABO: CPT

## 2018-04-28 PROCEDURE — 49440 PLACE GASTROSTOMY TUBE PERC: CPT

## 2018-04-28 PROCEDURE — 85730 THROMBOPLASTIN TIME PARTIAL: CPT

## 2018-04-28 PROCEDURE — 36430 TRANSFUSION BLD/BLD COMPNT: CPT

## 2018-04-28 PROCEDURE — 77417 THER RADIOLOGY PORT IMAGE(S): CPT

## 2018-04-28 PROCEDURE — 74018 RADEX ABDOMEN 1 VIEW: CPT

## 2018-04-28 PROCEDURE — 84100 ASSAY OF PHOSPHORUS: CPT

## 2018-04-28 PROCEDURE — 85027 COMPLETE CBC AUTOMATED: CPT

## 2018-04-28 PROCEDURE — 93005 ELECTROCARDIOGRAM TRACING: CPT

## 2018-04-28 PROCEDURE — 97162 PT EVAL MOD COMPLEX 30 MIN: CPT

## 2018-04-28 PROCEDURE — 74176 CT ABD & PELVIS W/O CONTRAST: CPT

## 2018-04-28 PROCEDURE — 82948 REAGENT STRIP/BLOOD GLUCOSE: CPT

## 2018-04-28 PROCEDURE — 83735 ASSAY OF MAGNESIUM: CPT

## 2018-04-28 PROCEDURE — 80202 ASSAY OF VANCOMYCIN: CPT

## 2018-04-28 PROCEDURE — 77427 RADIATION TX MANAGEMENT X5: CPT

## 2018-04-28 PROCEDURE — 86901 BLOOD TYPING SEROLOGIC RH(D): CPT

## 2018-04-28 PROCEDURE — 70450 CT HEAD/BRAIN W/O DYE: CPT

## 2018-04-28 PROCEDURE — 71045 X-RAY EXAM CHEST 1 VIEW: CPT

## 2018-04-28 PROCEDURE — 99153 MOD SED SAME PHYS/QHP EA: CPT

## 2018-04-28 PROCEDURE — 85007 BL SMEAR W/DIFF WBC COUNT: CPT

## 2018-04-28 PROCEDURE — 77336 RADIATION PHYSICS CONSULT: CPT

## 2018-04-28 PROCEDURE — 82565 ASSAY OF CREATININE: CPT

## 2018-04-28 PROCEDURE — 97530 THERAPEUTIC ACTIVITIES: CPT

## 2018-04-28 RX ADMIN — ONDANSETRON 1 MG: 2 INJECTION, SOLUTION INTRAMUSCULAR; INTRAVENOUS at 23:48

## 2018-04-28 RX ADMIN — PHENYTOIN SODIUM 1 MLS/HR: 50 INJECTION INTRAMUSCULAR; INTRAVENOUS at 22:13

## 2018-04-28 RX ADMIN — HYDROCODONE BITARTRATE AND ACETAMINOPHEN 1 TAB: 5; 325 TABLET ORAL at 23:49

## 2018-04-28 RX ADMIN — SODIUM CHLORIDE 1 MLS/HR: 900 INJECTION INTRAVENOUS at 22:06

## 2018-04-28 RX ADMIN — PHENYTOIN SODIUM 1 MLS/HR: 50 INJECTION INTRAMUSCULAR; INTRAVENOUS at 23:00

## 2018-04-29 LAB
ALBUMIN: 2.3 GM/DL (ref 3.4–5)
ALKALINE PHOSPHATASE: 83 U/L (ref 45–117)
ALT (GPT): 10 U/L (ref 10–53)
ANION GAP: 9 MEQ/L (ref 5–15)
AST (GOT): 8 U/L (ref 15–37)
AUTOMATED NEUTROPHIL #: 6.5 TH/MM3 (ref 1.8–7.7)
BASOPHIL #: 0 TH/MM3 (ref 0–0.2)
BASOPHIL %: 0.1 % (ref 0–2)
BICARBONATE: 22.8 MEQ/L (ref 21–32)
BILIRUB SERPL-MCNC: 0.5 MG/DL (ref 0.2–1)
BLOOD UREA NITROGEN: 27 MG/DL (ref 7–18)
CALCIUM TP COR SERPL-MCNC: 11.2 MG/DL (ref 8.5–10.1)
CALCIUM: 11.7 MG/DL (ref 8.5–10.1)
CHLORIDE: 106 MEQ/L (ref 98–107)
CREATININE: 1.17 MG/DL (ref 0.5–1)
EOSINOPHIL #: 0.1 TH/MM3 (ref 0–0.4)
EOSINOPHIL %: 1.6 % (ref 0–4)
GLOMERULAR FILTRATION RATE: 55 ML/MIN (ref 89–?)
GLUCOSE,RANDOM: 110 MG/DL (ref 74–106)
HEMATOCRIT: 27.7 % (ref 35–46)
HEMO FLAGS: (no result)
HEMOGLOBIN: 8.9 GM/DL (ref 11.6–15.3)
LYMPH %: 5.4 % (ref 9–44)
LYMPHOCYTE #: 0.4 TH/MM3 (ref 1–4.8)
MEAN CELL VOLUME: 79 FL (ref 80–100)
MEAN CORPUSCULAR HEMOGLOBIN: 25.5 PG (ref 27–34)
MEAN CORPUSCULAR HGB CONC: 32.3 % (ref 32–36)
MEAN PLATELET VOLUME: 7 FL (ref 7–11)
MONO %: 6.7 % (ref 0–8)
MONOCYTE #: 0.5 TH/MM3 (ref 0–0.9)
NEUT %: 86.2 % (ref 16–70)
PLATELET COUNT: 191 TH/MM3 (ref 150–450)
POTASSIUM: 4.4 MEQ/L (ref 3.5–5.1)
RED BLOOD COUNT: 3.5 MIL/MM3 (ref 4–5.3)
RED CELL DISTRIBUTION WIDTH: 18.1 % (ref 11.6–17.2)
SODIUM (NA): 138 MEQ/L (ref 136–145)
TOTAL PROTEIN: 7.9 GM/DL (ref 6.4–8.2)
WHITE BLOOD COUNT: 7.5 TH/MM3 (ref 4–11)

## 2018-04-29 RX ADMIN — Medication 1 ML: at 09:00

## 2018-04-29 RX ADMIN — HYDROCODONE BITARTRATE AND ACETAMINOPHEN 1 TAB: 10; 325 TABLET ORAL at 04:17

## 2018-04-29 RX ADMIN — HYDROCODONE BITARTRATE AND ACETAMINOPHEN 1 TAB: 10; 325 TABLET ORAL at 14:36

## 2018-04-29 RX ADMIN — STANDARDIZED SENNA CONCENTRATE AND DOCUSATE SODIUM 1 TAB: 8.6; 5 TABLET, FILM COATED ORAL at 21:56

## 2018-04-29 RX ADMIN — PHENYTOIN SODIUM 1 MLS/HR: 50 INJECTION INTRAMUSCULAR; INTRAVENOUS at 11:14

## 2018-04-29 RX ADMIN — SODIUM CHLORIDE 1 MLS/HR: 900 INJECTION INTRAVENOUS at 00:50

## 2018-04-29 RX ADMIN — STANDARDIZED SENNA CONCENTRATE AND DOCUSATE SODIUM 1 TAB: 8.6; 5 TABLET, FILM COATED ORAL at 09:52

## 2018-04-29 RX ADMIN — Medication 1 ML: at 21:57

## 2018-04-29 RX ADMIN — FLUCONAZOLE 1 MG: 100 TABLET ORAL at 09:52

## 2018-04-29 RX ADMIN — PHENYTOIN SODIUM 1 MLS/HR: 50 INJECTION INTRAMUSCULAR; INTRAVENOUS at 17:53

## 2018-04-29 RX ADMIN — HYDROCODONE BITARTRATE AND ACETAMINOPHEN 1 TAB: 10; 325 TABLET ORAL at 18:45

## 2018-04-29 RX ADMIN — HYDROCODONE BITARTRATE AND ACETAMINOPHEN 1 TAB: 10; 325 TABLET ORAL at 09:57

## 2018-04-29 RX ADMIN — HYDROCODONE BITARTRATE AND ACETAMINOPHEN 1 TAB: 10; 325 TABLET ORAL at 23:26

## 2018-04-29 RX ADMIN — SODIUM CHLORIDE 1 MLS/HR: 900 INJECTION INTRAVENOUS at 12:17

## 2018-04-30 LAB
ANION GAP: 7 MEQ/L (ref 5–15)
AUTOMATED NEUTROPHIL #: 5.2 TH/MM3 (ref 1.8–7.7)
BASOPHIL #: 0 TH/MM3 (ref 0–0.2)
BASOPHIL %: 0.2 % (ref 0–2)
BICARBONATE: 25.1 MEQ/L (ref 21–32)
BLOOD UREA NITROGEN: 15 MG/DL (ref 7–18)
CALCIUM: 11.2 MG/DL (ref 8.5–10.1)
CHLORIDE: 114 MEQ/L (ref 98–107)
CREATININE: 0.88 MG/DL (ref 0.5–1)
EOSINOPHIL #: 0.1 TH/MM3 (ref 0–0.4)
EOSINOPHIL %: 2.2 % (ref 0–4)
GLOMERULAR FILTRATION RATE: 76 ML/MIN (ref 89–?)
GLUCOSE,RANDOM: 94 MG/DL (ref 74–106)
HEMATOCRIT: 26.9 % (ref 35–46)
HEMO FLAGS: (no result)
HEMOGLOBIN: 8.5 GM/DL (ref 11.6–15.3)
LYMPH %: 5 % (ref 9–44)
LYMPHOCYTE #: 0.3 TH/MM3 (ref 1–4.8)
MEAN CELL VOLUME: 81.4 FL (ref 80–100)
MEAN CORPUSCULAR HEMOGLOBIN: 25.7 PG (ref 27–34)
MEAN CORPUSCULAR HGB CONC: 31.5 % (ref 32–36)
MEAN PLATELET VOLUME: 6.8 FL (ref 7–11)
MONO %: 6.4 % (ref 0–8)
MONOCYTE #: 0.4 TH/MM3 (ref 0–0.9)
NEUT %: 86.2 % (ref 16–70)
PLATELET COUNT: 176 TH/MM3 (ref 150–450)
POTASSIUM: 3.8 MEQ/L (ref 3.5–5.1)
RED BLOOD COUNT: 3.31 MIL/MM3 (ref 4–5.3)
RED CELL DISTRIBUTION WIDTH: 18.3 % (ref 11.6–17.2)
SODIUM (NA): 146 MEQ/L (ref 136–145)
WHITE BLOOD COUNT: 6 TH/MM3 (ref 4–11)

## 2018-04-30 PROCEDURE — 0T9B70Z DRAINAGE OF BLADDER WITH DRAINAGE DEVICE, VIA NATURAL OR ARTIFICIAL OPENING: ICD-10-PCS

## 2018-04-30 PROCEDURE — 0T768DZ DILATION OF RIGHT URETER WITH INTRALUMINAL DEVICE, VIA NATURAL OR ARTIFICIAL OPENING ENDOSCOPIC: ICD-10-PCS

## 2018-04-30 PROCEDURE — 30233N1 TRANSFUSION OF NONAUTOLOGOUS RED BLOOD CELLS INTO PERIPHERAL VEIN, PERCUTANEOUS APPROACH: ICD-10-PCS

## 2018-04-30 PROCEDURE — 0TP98DZ REMOVAL OF INTRALUMINAL DEVICE FROM URETER, VIA NATURAL OR ARTIFICIAL OPENING ENDOSCOPIC: ICD-10-PCS

## 2018-04-30 PROCEDURE — BT1D1ZZ FLUOROSCOPY OF RIGHT KIDNEY, URETER AND BLADDER USING LOW OSMOLAR CONTRAST: ICD-10-PCS

## 2018-04-30 PROCEDURE — 0DH63UZ INSERTION OF FEEDING DEVICE INTO STOMACH, PERCUTANEOUS APPROACH: ICD-10-PCS

## 2018-04-30 RX ADMIN — HYDROCODONE BITARTRATE AND ACETAMINOPHEN 1 TAB: 10; 325 TABLET ORAL at 05:28

## 2018-04-30 RX ADMIN — PHENYTOIN SODIUM 1 MLS/HR: 50 INJECTION INTRAMUSCULAR; INTRAVENOUS at 00:43

## 2018-04-30 RX ADMIN — Medication 1 ML: at 19:38

## 2018-04-30 RX ADMIN — HYDROCODONE BITARTRATE AND ACETAMINOPHEN 1 TAB: 10; 325 TABLET ORAL at 18:22

## 2018-04-30 RX ADMIN — LIDOCAINE HYDROCHLORIDE 1 ML: 10 INJECTION, SOLUTION EPIDURAL; INFILTRATION; INTRACAUDAL; PERINEURAL at 12:00

## 2018-04-30 RX ADMIN — TAZOBACTAM SODIUM AND PIPERACILLIN SODIUM 1 MLS/HR: 375; 3 INJECTION, SOLUTION INTRAVENOUS at 15:50

## 2018-04-30 RX ADMIN — PROPOFOL 1 MG: 10 INJECTION, EMULSION INTRAVENOUS at 12:00

## 2018-04-30 RX ADMIN — Medication 1 ML: at 09:00

## 2018-04-30 RX ADMIN — ONDANSETRON 1 MG: 2 INJECTION, SOLUTION INTRAMUSCULAR; INTRAVENOUS at 12:00

## 2018-04-30 RX ADMIN — MORPHINE SULFATE 1 MG: 2 INJECTION, SOLUTION INTRAMUSCULAR; INTRAVENOUS at 19:38

## 2018-04-30 RX ADMIN — TAZOBACTAM SODIUM AND PIPERACILLIN SODIUM 1 MLS/HR: 375; 3 INJECTION, SOLUTION INTRAVENOUS at 19:43

## 2018-04-30 RX ADMIN — PHENYTOIN SODIUM 1 MLS/HR: 50 INJECTION INTRAMUSCULAR; INTRAVENOUS at 23:53

## 2018-04-30 RX ADMIN — STANDARDIZED SENNA CONCENTRATE AND DOCUSATE SODIUM 1 TAB: 8.6; 5 TABLET, FILM COATED ORAL at 07:45

## 2018-04-30 RX ADMIN — PHENYLEPHRINE HYDROCHLORIDE 1 MCG: 10 INJECTION INTRAVENOUS at 12:00

## 2018-04-30 RX ADMIN — TAZOBACTAM SODIUM AND PIPERACILLIN SODIUM 1 MLS/HR: 375; 3 INJECTION, SOLUTION INTRAVENOUS at 08:02

## 2018-04-30 RX ADMIN — SODIUM CHLORIDE 1 MLS/HR: 900 INJECTION INTRAVENOUS at 05:28

## 2018-04-30 RX ADMIN — STANDARDIZED SENNA CONCENTRATE AND DOCUSATE SODIUM 1 TAB: 8.6; 5 TABLET, FILM COATED ORAL at 22:15

## 2018-04-30 RX ADMIN — FLUCONAZOLE 1 MG: 100 TABLET ORAL at 07:45

## 2018-04-30 RX ADMIN — PHENYTOIN SODIUM 1 MLS/HR: 50 INJECTION INTRAMUSCULAR; INTRAVENOUS at 18:45

## 2018-05-01 LAB
ANION GAP: 6 MEQ/L (ref 5–15)
AUTOMATED NEUTROPHIL #: 4.1 TH/MM3 (ref 1.8–7.7)
BASOPHIL #: 0 TH/MM3 (ref 0–0.2)
BASOPHIL %: 0.2 % (ref 0–2)
BICARBONATE: 24.6 MEQ/L (ref 21–32)
BLOOD UREA NITROGEN: 11 MG/DL (ref 7–18)
CALCIUM: 11 MG/DL (ref 8.5–10.1)
CHLORIDE: 118 MEQ/L (ref 98–107)
CREATININE: 0.86 MG/DL (ref 0.5–1)
EOSINOPHIL #: 0.1 TH/MM3 (ref 0–0.4)
EOSINOPHIL %: 2.7 % (ref 0–4)
GLOMERULAR FILTRATION RATE: 78 ML/MIN (ref 89–?)
GLUCOSE,RANDOM: 102 MG/DL (ref 74–106)
HEMATOCRIT: 22.4 % (ref 35–46)
HEMO FLAGS: (no result)
HEMOGLOBIN: 7.1 GM/DL (ref 11.6–15.3)
LYMPH %: 6.6 % (ref 9–44)
LYMPHOCYTE #: 0.3 TH/MM3 (ref 1–4.8)
MEAN CELL VOLUME: 81.8 FL (ref 80–100)
MEAN CORPUSCULAR HEMOGLOBIN: 26.1 PG (ref 27–34)
MEAN CORPUSCULAR HGB CONC: 31.9 % (ref 32–36)
MEAN PLATELET VOLUME: 6.9 FL (ref 7–11)
MONO %: 6.9 % (ref 0–8)
MONOCYTE #: 0.3 TH/MM3 (ref 0–0.9)
NEUT %: 83.6 % (ref 16–70)
PLATELET COUNT: 166 TH/MM3 (ref 150–450)
POTASSIUM: 3.6 MEQ/L (ref 3.5–5.1)
RED BLOOD COUNT: 2.74 MIL/MM3 (ref 4–5.3)
RED CELL DISTRIBUTION WIDTH: 18.5 % (ref 11.6–17.2)
SODIUM (NA): 149 MEQ/L (ref 136–145)
VANCOMYCIN TROUGH: 19.1 MCG/ML (ref 5–10)
WHITE BLOOD COUNT: 4.9 TH/MM3 (ref 4–11)

## 2018-05-01 RX ADMIN — HYDROCODONE BITARTRATE AND ACETAMINOPHEN 1 TAB: 10; 325 TABLET ORAL at 14:48

## 2018-05-01 RX ADMIN — TAZOBACTAM SODIUM AND PIPERACILLIN SODIUM 1 MLS/HR: 375; 3 INJECTION, SOLUTION INTRAVENOUS at 08:04

## 2018-05-01 RX ADMIN — TAZOBACTAM SODIUM AND PIPERACILLIN SODIUM 1 MLS/HR: 375; 3 INJECTION, SOLUTION INTRAVENOUS at 20:06

## 2018-05-01 RX ADMIN — STANDARDIZED SENNA CONCENTRATE AND DOCUSATE SODIUM 1 TAB: 8.6; 5 TABLET, FILM COATED ORAL at 08:04

## 2018-05-01 RX ADMIN — PHENYTOIN SODIUM 1 MLS/HR: 50 INJECTION INTRAMUSCULAR; INTRAVENOUS at 07:13

## 2018-05-01 RX ADMIN — SODIUM CHLORIDE 1 MLS/HR: 900 INJECTION INTRAVENOUS at 00:37

## 2018-05-01 RX ADMIN — FLUCONAZOLE 1 MG: 100 TABLET ORAL at 08:04

## 2018-05-01 RX ADMIN — TAZOBACTAM SODIUM AND PIPERACILLIN SODIUM 1 MLS/HR: 375; 3 INJECTION, SOLUTION INTRAVENOUS at 02:12

## 2018-05-01 RX ADMIN — SODIUM CHLORIDE 1 MLS/HR: 900 INJECTION INTRAVENOUS at 19:01

## 2018-05-01 RX ADMIN — HYDROCODONE BITARTRATE AND ACETAMINOPHEN 1 TAB: 10; 325 TABLET ORAL at 02:16

## 2018-05-01 RX ADMIN — Medication 1 ML: at 07:59

## 2018-05-01 RX ADMIN — STANDARDIZED SENNA CONCENTRATE AND DOCUSATE SODIUM 1 TAB: 8.6; 5 TABLET, FILM COATED ORAL at 20:04

## 2018-05-01 RX ADMIN — Medication 1 ML: at 20:04

## 2018-05-01 RX ADMIN — HYDROCODONE BITARTRATE AND ACETAMINOPHEN 1 TAB: 10; 325 TABLET ORAL at 20:03

## 2018-05-01 RX ADMIN — PHENYTOIN SODIUM 1 MLS/HR: 50 INJECTION INTRAMUSCULAR; INTRAVENOUS at 20:06

## 2018-05-01 RX ADMIN — TAZOBACTAM SODIUM AND PIPERACILLIN SODIUM 1 MLS/HR: 375; 3 INJECTION, SOLUTION INTRAVENOUS at 14:47

## 2018-05-01 RX ADMIN — MORPHINE SULFATE 1 MG: 2 INJECTION, SOLUTION INTRAMUSCULAR; INTRAVENOUS at 11:13

## 2018-05-02 LAB
CREATININE: 0.93 MG/DL (ref 0.5–1)
GLOMERULAR FILTRATION RATE: 72 ML/MIN (ref 89–?)

## 2018-05-02 RX ADMIN — STANDARDIZED SENNA CONCENTRATE AND DOCUSATE SODIUM 1 TAB: 8.6; 5 TABLET, FILM COATED ORAL at 21:00

## 2018-05-02 RX ADMIN — PHENYTOIN SODIUM 1 MLS/HR: 50 INJECTION INTRAMUSCULAR; INTRAVENOUS at 06:52

## 2018-05-02 RX ADMIN — HYDROCODONE BITARTRATE AND ACETAMINOPHEN 1 TAB: 10; 325 TABLET ORAL at 03:08

## 2018-05-02 RX ADMIN — TAZOBACTAM SODIUM AND PIPERACILLIN SODIUM 1 MLS/HR: 375; 3 INJECTION, SOLUTION INTRAVENOUS at 03:04

## 2018-05-02 RX ADMIN — TAZOBACTAM SODIUM AND PIPERACILLIN SODIUM 1 MLS/HR: 375; 3 INJECTION, SOLUTION INTRAVENOUS at 08:18

## 2018-05-02 RX ADMIN — Medication 1 ML: at 09:00

## 2018-05-02 RX ADMIN — STANDARDIZED SENNA CONCENTRATE AND DOCUSATE SODIUM 1 TAB: 8.6; 5 TABLET, FILM COATED ORAL at 08:18

## 2018-05-02 RX ADMIN — SODIUM CHLORIDE 1 MLS/HR: 900 INJECTION INTRAVENOUS at 12:08

## 2018-05-02 RX ADMIN — PHENYTOIN SODIUM 1 MLS/HR: 50 INJECTION INTRAMUSCULAR; INTRAVENOUS at 16:18

## 2018-05-02 RX ADMIN — HYDROCODONE BITARTRATE AND ACETAMINOPHEN 1 TAB: 10; 325 TABLET ORAL at 16:19

## 2018-05-02 RX ADMIN — FLUCONAZOLE 1 MG: 100 TABLET ORAL at 08:18

## 2018-05-02 RX ADMIN — Medication 1 ML: at 21:00

## 2018-05-03 LAB
ANION GAP: 7 MEQ/L (ref 5–15)
AUTOMATED NEUTROPHIL #: 5.4 TH/MM3 (ref 1.8–7.7)
BANDS: 11 % (ref 0–6)
BASOPHIL #: 0 TH/MM3 (ref 0–0.2)
BASOPHIL %: 0.4 % (ref 0–2)
BASOPHILS: 1 % (ref 0–2)
BICARBONATE: 23.7 MEQ/L (ref 21–32)
BLOOD UREA NITROGEN: 6 MG/DL (ref 7–18)
CALCIUM: 10.7 MG/DL (ref 8.5–10.1)
CHLORIDE: 121 MEQ/L (ref 98–107)
CREATININE: 0.91 MG/DL (ref 0.5–1)
EOSINOPHIL #: 0.1 TH/MM3 (ref 0–0.4)
EOSINOPHIL %: 2.1 % (ref 0–4)
EOSINOPHILS: 1 % (ref 0–4)
GLOMERULAR FILTRATION RATE: 73 ML/MIN (ref 89–?)
GLUCOSE,RANDOM: 83 MG/DL (ref 74–106)
HEMATOCRIT: 23.7 % (ref 35–46)
HEMO FLAGS: (no result)
HEMOGLOBIN: 7.8 GM/DL (ref 11.6–15.3)
LYMPH %: 7.4 % (ref 9–44)
LYMPHOCYTE #: 0.5 TH/MM3 (ref 1–4.8)
LYMPHOCYTES: 6 % (ref 9–44)
MEAN CELL VOLUME: 80.7 FL (ref 80–100)
MEAN CORPUSCULAR HEMOGLOBIN: 26.6 PG (ref 27–34)
MEAN CORPUSCULAR HGB CONC: 33 % (ref 32–36)
MEAN PLATELET VOLUME: 6.5 FL (ref 7–11)
METAMYELOCYTES: 1 % (ref 0–1)
MONO %: 6.1 % (ref 0–8)
MONOCYTE #: 0.4 TH/MM3 (ref 0–0.9)
MONOCYTES: 4 % (ref 0–8)
NEUT %: 84 % (ref 16–70)
NEUTROPHIL # MANUAL DIFF: 5.6 TH/MM3 (ref 1.8–7.7)
OVALOCYTES: (no result)
PLAT MORPH BLD: NORMAL
PLATELET COUNT: 200 TH/MM3 (ref 150–450)
PLATELET ESTIMATE SMEAR: NORMAL
POLYS (SEG NEUTROPHILS): 76 % (ref 16–70)
POTASSIUM: 3 MEQ/L (ref 3.5–5.1)
RED BLOOD COUNT: 2.94 MIL/MM3 (ref 4–5.3)
RED CELL DISTRIBUTION WIDTH: 18.4 % (ref 11.6–17.2)
SCAN/DIFF: (no result)
SODIUM (NA): 152 MEQ/L (ref 136–145)
WBC DIFF SAMPLE: 100
WHITE BLOOD COUNT: 6.4 TH/MM3 (ref 4–11)

## 2018-05-03 RX ADMIN — POTASSIUM BICARBONATE 1 MEQ: 25 TABLET, EFFERVESCENT ORAL at 09:19

## 2018-05-03 RX ADMIN — HYDROCODONE BITARTRATE AND ACETAMINOPHEN 1 TAB: 5; 325 TABLET ORAL at 18:05

## 2018-05-03 RX ADMIN — Medication 1 ML: at 21:00

## 2018-05-03 RX ADMIN — Medication 1 ML: at 07:15

## 2018-05-03 RX ADMIN — STANDARDIZED SENNA CONCENTRATE AND DOCUSATE SODIUM 1 TAB: 8.6; 5 TABLET, FILM COATED ORAL at 21:00

## 2018-05-03 RX ADMIN — PHENYTOIN SODIUM 1 MLS/HR: 50 INJECTION INTRAMUSCULAR; INTRAVENOUS at 05:07

## 2018-05-03 RX ADMIN — PHENYTOIN SODIUM 1 MLS/HR: 50 INJECTION INTRAMUSCULAR; INTRAVENOUS at 22:59

## 2018-05-03 RX ADMIN — FLUCONAZOLE 1 MG: 100 TABLET ORAL at 07:30

## 2018-05-03 RX ADMIN — PHENYTOIN SODIUM 1 MLS/HR: 50 INJECTION INTRAMUSCULAR; INTRAVENOUS at 11:53

## 2018-05-03 RX ADMIN — ACYCLOVIR 1 TAB: 800 TABLET ORAL at 15:30

## 2018-05-03 RX ADMIN — STANDARDIZED SENNA CONCENTRATE AND DOCUSATE SODIUM 1 TAB: 8.6; 5 TABLET, FILM COATED ORAL at 07:30

## 2018-05-03 RX ADMIN — HYDROCODONE BITARTRATE AND ACETAMINOPHEN 1 TAB: 5; 325 TABLET ORAL at 08:43

## 2018-05-03 RX ADMIN — ONDANSETRON 1 MG: 2 INJECTION, SOLUTION INTRAMUSCULAR; INTRAVENOUS at 17:02

## 2018-05-04 RX ADMIN — STANDARDIZED SENNA CONCENTRATE AND DOCUSATE SODIUM 1 TAB: 8.6; 5 TABLET, FILM COATED ORAL at 11:20

## 2018-05-04 RX ADMIN — MORPHINE SULFATE 1 MG: 2 INJECTION, SOLUTION INTRAMUSCULAR; INTRAVENOUS at 06:17

## 2018-05-04 RX ADMIN — FLUCONAZOLE 1 MG: 100 TABLET ORAL at 11:20

## 2018-05-04 RX ADMIN — Medication 1 ML: at 22:33

## 2018-05-04 RX ADMIN — Medication 1 ML: at 11:21

## 2018-05-04 RX ADMIN — HYDROCODONE BITARTRATE AND ACETAMINOPHEN 1 TAB: 10; 325 TABLET ORAL at 11:21

## 2018-05-04 RX ADMIN — PHENYTOIN SODIUM 1 MLS/HR: 50 INJECTION INTRAMUSCULAR; INTRAVENOUS at 22:33

## 2018-05-04 RX ADMIN — STANDARDIZED SENNA CONCENTRATE AND DOCUSATE SODIUM 1 TAB: 8.6; 5 TABLET, FILM COATED ORAL at 21:00

## 2018-05-04 RX ADMIN — PHENYTOIN SODIUM 1 MLS/HR: 50 INJECTION INTRAMUSCULAR; INTRAVENOUS at 11:30

## 2018-05-05 RX ADMIN — MEGESTROL ACETATE 1 MG: 40 SUSPENSION ORAL at 12:11

## 2018-05-05 RX ADMIN — Medication 1 ML: at 21:15

## 2018-05-05 RX ADMIN — Medication 1 ML: at 08:41

## 2018-05-05 RX ADMIN — FLUCONAZOLE 1 MG: 100 TABLET ORAL at 08:40

## 2018-05-05 RX ADMIN — PHENYTOIN SODIUM 1 MLS/HR: 50 INJECTION INTRAMUSCULAR; INTRAVENOUS at 08:39

## 2018-05-05 RX ADMIN — MORPHINE SULFATE 1 MG: 2 INJECTION, SOLUTION INTRAMUSCULAR; INTRAVENOUS at 06:25

## 2018-05-05 RX ADMIN — STANDARDIZED SENNA CONCENTRATE AND DOCUSATE SODIUM 1 TAB: 8.6; 5 TABLET, FILM COATED ORAL at 21:15

## 2018-05-05 RX ADMIN — PHENYTOIN SODIUM 1 MLS/HR: 50 INJECTION INTRAMUSCULAR; INTRAVENOUS at 14:53

## 2018-05-05 RX ADMIN — PHENYTOIN SODIUM 1 MLS/HR: 50 INJECTION INTRAMUSCULAR; INTRAVENOUS at 23:53

## 2018-05-05 RX ADMIN — MORPHINE SULFATE 1 MG: 2 INJECTION, SOLUTION INTRAMUSCULAR; INTRAVENOUS at 14:53

## 2018-05-05 RX ADMIN — STANDARDIZED SENNA CONCENTRATE AND DOCUSATE SODIUM 1 TAB: 8.6; 5 TABLET, FILM COATED ORAL at 08:40

## 2018-05-06 LAB
ANION GAP: 7 MEQ/L (ref 5–15)
AUTOMATED NEUTROPHIL #: 3.7 TH/MM3 (ref 1.8–7.7)
BASOPHIL #: 0 TH/MM3 (ref 0–0.2)
BASOPHIL %: 0.4 % (ref 0–2)
BICARBONATE: 25.3 MEQ/L (ref 21–32)
BLOOD UREA NITROGEN: 4 MG/DL (ref 7–18)
CALCIUM: 9.6 MG/DL (ref 8.5–10.1)
CHLORIDE: 119 MEQ/L (ref 98–107)
CREATININE: 0.71 MG/DL (ref 0.5–1)
EOSINOPHIL #: 0.2 TH/MM3 (ref 0–0.4)
EOSINOPHIL %: 4.1 % (ref 0–4)
GLOMERULAR FILTRATION RATE: 98 ML/MIN (ref 89–?)
GLUCOSE,RANDOM: 99 MG/DL (ref 74–106)
HEMATOCRIT: 23.4 % (ref 35–46)
HEMO FLAGS: (no result)
HEMOGLOBIN: 7.5 GM/DL (ref 11.6–15.3)
LYMPH %: 9.5 % (ref 9–44)
LYMPHOCYTE #: 0.4 TH/MM3 (ref 1–4.8)
MAGNESIUM: 1.9 MG/DL (ref 1.5–2.5)
MEAN CELL VOLUME: 80.7 FL (ref 80–100)
MEAN CORPUSCULAR HEMOGLOBIN: 25.8 PG (ref 27–34)
MEAN CORPUSCULAR HGB CONC: 31.9 % (ref 32–36)
MEAN PLATELET VOLUME: 6.5 FL (ref 7–11)
MONO %: 5.8 % (ref 0–8)
MONOCYTE #: 0.3 TH/MM3 (ref 0–0.9)
NEUT %: 80.2 % (ref 16–70)
PLATELET COUNT: 200 TH/MM3 (ref 150–450)
POTASSIUM: 2.7 MEQ/L (ref 3.5–5.1)
RED BLOOD COUNT: 2.9 MIL/MM3 (ref 4–5.3)
RED CELL DISTRIBUTION WIDTH: 19.5 % (ref 11.6–17.2)
SCAN/DIFF: (no result)
SODIUM (NA): 151 MEQ/L (ref 136–145)
WHITE BLOOD COUNT: 4.7 TH/MM3 (ref 4–11)

## 2018-05-06 RX ADMIN — POTASSIUM CHLORIDE 1 MEQ: 750 TABLET, FILM COATED, EXTENDED RELEASE ORAL at 13:34

## 2018-05-06 RX ADMIN — FLUCONAZOLE 1 MG: 100 TABLET ORAL at 08:30

## 2018-05-06 RX ADMIN — HYDROCODONE BITARTRATE AND ACETAMINOPHEN 1 TAB: 10; 325 TABLET ORAL at 22:15

## 2018-05-06 RX ADMIN — STANDARDIZED SENNA CONCENTRATE AND DOCUSATE SODIUM 1 TAB: 8.6; 5 TABLET, FILM COATED ORAL at 08:30

## 2018-05-06 RX ADMIN — POTASSIUM CHLORIDE 1 MEQ: 750 TABLET, FILM COATED, EXTENDED RELEASE ORAL at 10:49

## 2018-05-06 RX ADMIN — HYDROCODONE BITARTRATE AND ACETAMINOPHEN 1 TAB: 10; 325 TABLET ORAL at 00:06

## 2018-05-06 RX ADMIN — HYDROCODONE BITARTRATE AND ACETAMINOPHEN 1 TAB: 10; 325 TABLET ORAL at 10:49

## 2018-05-06 RX ADMIN — Medication 1 ML: at 22:17

## 2018-05-06 RX ADMIN — MEGESTROL ACETATE 1 MG: 40 SUSPENSION ORAL at 08:30

## 2018-05-06 RX ADMIN — PHENYTOIN SODIUM 1 MLS/HR: 50 INJECTION INTRAMUSCULAR; INTRAVENOUS at 05:01

## 2018-05-06 RX ADMIN — PHENYTOIN SODIUM 1 MLS/HR: 50 INJECTION INTRAMUSCULAR; INTRAVENOUS at 14:27

## 2018-05-06 RX ADMIN — HYDROCODONE BITARTRATE AND ACETAMINOPHEN 1 TAB: 10; 325 TABLET ORAL at 14:33

## 2018-05-06 RX ADMIN — STANDARDIZED SENNA CONCENTRATE AND DOCUSATE SODIUM 1 TAB: 8.6; 5 TABLET, FILM COATED ORAL at 22:15

## 2018-05-06 RX ADMIN — Medication 1 ML: at 09:00

## 2018-05-07 RX ADMIN — MIDAZOLAM HYDROCHLORIDE 1 MG: 1 INJECTION, SOLUTION INTRAMUSCULAR; INTRAVENOUS at 11:42

## 2018-05-07 RX ADMIN — HYDROCODONE BITARTRATE AND ACETAMINOPHEN 1 TAB: 10; 325 TABLET ORAL at 07:42

## 2018-05-07 RX ADMIN — HYDROCODONE BITARTRATE AND ACETAMINOPHEN 1 TAB: 5; 325 TABLET ORAL at 22:01

## 2018-05-07 RX ADMIN — MIDAZOLAM HYDROCHLORIDE 1 MG: 1 INJECTION, SOLUTION INTRAMUSCULAR; INTRAVENOUS at 11:03

## 2018-05-07 RX ADMIN — FLUCONAZOLE 1 MG: 100 TABLET ORAL at 07:43

## 2018-05-07 RX ADMIN — IOHEXOL 1 ML: 350 INJECTION, SOLUTION INTRAVENOUS at 11:50

## 2018-05-07 RX ADMIN — STANDARDIZED SENNA CONCENTRATE AND DOCUSATE SODIUM 1 TAB: 8.6; 5 TABLET, FILM COATED ORAL at 21:58

## 2018-05-07 RX ADMIN — PHENYTOIN SODIUM 1 MLS/HR: 50 INJECTION INTRAMUSCULAR; INTRAVENOUS at 00:52

## 2018-05-07 RX ADMIN — CEFAZOLIN SODIUM 1 MLS/HR: 2 SOLUTION INTRAVENOUS at 11:04

## 2018-05-07 RX ADMIN — MEGESTROL ACETATE 1 MG: 40 SUSPENSION ORAL at 07:43

## 2018-05-07 RX ADMIN — STANDARDIZED SENNA CONCENTRATE AND DOCUSATE SODIUM 1 TAB: 8.6; 5 TABLET, FILM COATED ORAL at 07:43

## 2018-05-07 RX ADMIN — DEXTROSE MONOHYDRATE 1 MG: 5 INJECTION, SOLUTION INTRAVENOUS at 11:04

## 2018-05-07 RX ADMIN — HYDROCODONE BITARTRATE AND ACETAMINOPHEN 1 TAB: 10; 325 TABLET ORAL at 16:06

## 2018-05-07 RX ADMIN — Medication 1 ML: at 07:43

## 2018-05-07 RX ADMIN — PHENYTOIN SODIUM 1 MLS/HR: 50 INJECTION INTRAMUSCULAR; INTRAVENOUS at 21:59

## 2018-05-07 RX ADMIN — Medication 1 ML: at 21:58

## 2018-05-07 RX ADMIN — MAGNESIUM HYDROXIDE 1 ML: 400 SUSPENSION ORAL at 22:01

## 2018-05-08 LAB
ANION GAP: 6 MEQ/L (ref 5–15)
BICARBONATE: 25.4 MEQ/L (ref 21–32)
BLOOD UREA NITROGEN: 6 MG/DL (ref 7–18)
CALCIUM: 9.1 MG/DL (ref 8.5–10.1)
CHLORIDE: 115 MEQ/L (ref 98–107)
CREATININE: 0.81 MG/DL (ref 0.5–1)
GLOMERULAR FILTRATION RATE: 84 ML/MIN (ref 89–?)
GLUCOSE,RANDOM: 135 MG/DL (ref 74–106)
POTASSIUM: 3.2 MEQ/L (ref 3.5–5.1)
SODIUM (NA): 146 MEQ/L (ref 136–145)

## 2018-05-08 RX ADMIN — HYDROCODONE BITARTRATE AND ACETAMINOPHEN 1 TAB: 5; 325 TABLET ORAL at 19:24

## 2018-05-08 RX ADMIN — Medication 1 ML: at 10:00

## 2018-05-08 RX ADMIN — STANDARDIZED SENNA CONCENTRATE AND DOCUSATE SODIUM 1 TAB: 8.6; 5 TABLET, FILM COATED ORAL at 09:00

## 2018-05-08 RX ADMIN — STANDARDIZED SENNA CONCENTRATE AND DOCUSATE SODIUM 1 TAB: 8.6; 5 TABLET, FILM COATED ORAL at 19:24

## 2018-05-08 RX ADMIN — Medication 1 ML: at 20:58

## 2018-05-08 RX ADMIN — PHENYTOIN SODIUM 1 MLS/HR: 50 INJECTION INTRAMUSCULAR; INTRAVENOUS at 04:29

## 2018-05-08 RX ADMIN — FLUCONAZOLE 1 MG: 100 TABLET ORAL at 09:59

## 2018-05-08 RX ADMIN — HYDROCODONE BITARTRATE AND ACETAMINOPHEN 1 TAB: 5; 325 TABLET ORAL at 04:34

## 2018-05-08 RX ADMIN — POTASSIUM BICARBONATE 1 MEQ: 25 TABLET, EFFERVESCENT ORAL at 10:12

## 2018-05-08 RX ADMIN — PHENYTOIN SODIUM 1 MLS/HR: 50 INJECTION INTRAMUSCULAR; INTRAVENOUS at 00:49

## 2018-05-08 RX ADMIN — MEGESTROL ACETATE 1 MG: 40 SUSPENSION ORAL at 09:59

## 2018-05-09 LAB
ANION GAP: 6 MEQ/L (ref 5–15)
AUTOMATED NEUTROPHIL #: 2.5 TH/MM3 (ref 1.8–7.7)
BASOPHIL #: 0 TH/MM3 (ref 0–0.2)
BASOPHIL %: 0.2 % (ref 0–2)
BICARBONATE: 26.7 MEQ/L (ref 21–32)
BLOOD UREA NITROGEN: 6 MG/DL (ref 7–18)
CALCIUM: 9.5 MG/DL (ref 8.5–10.1)
CHLORIDE: 112 MEQ/L (ref 98–107)
CREATININE: 0.74 MG/DL (ref 0.5–1)
EOSINOPHIL #: 0.1 TH/MM3 (ref 0–0.4)
EOSINOPHIL %: 4.3 % (ref 0–4)
GLOMERULAR FILTRATION RATE: 93 ML/MIN (ref 89–?)
GLUCOSE,RANDOM: 120 MG/DL (ref 74–106)
HEMATOCRIT: 21.9 % (ref 35–46)
HEMO FLAGS: (no result)
HEMOGLOBIN: 7.1 GM/DL (ref 11.6–15.3)
LYMPH %: 13.7 % (ref 9–44)
LYMPHOCYTE #: 0.5 TH/MM3 (ref 1–4.8)
MAGNESIUM: 2.2 MG/DL (ref 1.5–2.5)
MEAN CELL VOLUME: 81.3 FL (ref 80–100)
MEAN CORPUSCULAR HEMOGLOBIN: 26.3 PG (ref 27–34)
MEAN CORPUSCULAR HGB CONC: 32.4 % (ref 32–36)
MEAN PLATELET VOLUME: 7.1 FL (ref 7–11)
MONO %: 7.9 % (ref 0–8)
MONOCYTE #: 0.3 TH/MM3 (ref 0–0.9)
NEUT %: 73.9 % (ref 16–70)
PHOSPHORUS: 1.5 MG/DL (ref 2.5–4.9)
PLATELET COUNT: 183 TH/MM3 (ref 150–450)
POTASSIUM: 3.8 MEQ/L (ref 3.5–5.1)
RED BLOOD COUNT: 2.69 MIL/MM3 (ref 4–5.3)
RED CELL DISTRIBUTION WIDTH: 21 % (ref 11.6–17.2)
SODIUM (NA): 145 MEQ/L (ref 136–145)
WHITE BLOOD COUNT: 3.3 TH/MM3 (ref 4–11)

## 2018-05-09 RX ADMIN — HYDROCODONE BITARTRATE AND ACETAMINOPHEN 1 TAB: 10; 325 TABLET ORAL at 18:51

## 2018-05-09 RX ADMIN — STANDARDIZED SENNA CONCENTRATE AND DOCUSATE SODIUM 1 TAB: 8.6; 5 TABLET, FILM COATED ORAL at 21:00

## 2018-05-09 RX ADMIN — Medication 1 ML: at 21:00

## 2018-05-09 RX ADMIN — FLUCONAZOLE 1 MG: 100 TABLET ORAL at 09:39

## 2018-05-09 RX ADMIN — MEGESTROL ACETATE 1 MG: 40 SUSPENSION ORAL at 09:37

## 2018-05-09 RX ADMIN — STANDARDIZED SENNA CONCENTRATE AND DOCUSATE SODIUM 1 TAB: 8.6; 5 TABLET, FILM COATED ORAL at 09:00

## 2018-05-09 RX ADMIN — Medication 1 ML: at 09:39

## 2018-05-09 RX ADMIN — POTASSIUM PHOSPHATE, MONOBASIC 1 MG: 500 TABLET, SOLUBLE ORAL at 11:33

## 2018-05-09 RX ADMIN — POTASSIUM BICARBONATE 1 MEQ: 25 TABLET, EFFERVESCENT ORAL at 09:38

## 2018-05-10 RX ADMIN — Medication 1 ML: at 20:11

## 2018-05-10 RX ADMIN — STANDARDIZED SENNA CONCENTRATE AND DOCUSATE SODIUM 1 TAB: 8.6; 5 TABLET, FILM COATED ORAL at 07:28

## 2018-05-10 RX ADMIN — HYDROCODONE BITARTRATE AND ACETAMINOPHEN 1 TAB: 10; 325 TABLET ORAL at 20:10

## 2018-05-10 RX ADMIN — POTASSIUM BICARBONATE 1 MEQ: 25 TABLET, EFFERVESCENT ORAL at 07:28

## 2018-05-10 RX ADMIN — FLUCONAZOLE 1 MG: 100 TABLET ORAL at 07:27

## 2018-05-10 RX ADMIN — STANDARDIZED SENNA CONCENTRATE AND DOCUSATE SODIUM 1 TAB: 8.6; 5 TABLET, FILM COATED ORAL at 20:10

## 2018-05-10 RX ADMIN — MEGESTROL ACETATE 1 MG: 40 SUSPENSION ORAL at 07:28

## 2018-05-10 RX ADMIN — HYDROCODONE BITARTRATE AND ACETAMINOPHEN 1 TAB: 5; 325 TABLET ORAL at 07:45

## 2018-05-11 LAB
ANION GAP: 5 MEQ/L (ref 5–15)
AUTOMATED NEUTROPHIL #: 2.8 TH/MM3 (ref 1.8–7.7)
BASOPHIL #: 0 TH/MM3 (ref 0–0.2)
BASOPHIL %: 0.3 % (ref 0–2)
BICARBONATE: 30.9 MEQ/L (ref 21–32)
BLOOD UREA NITROGEN: 19 MG/DL (ref 7–18)
CALCIUM: 10.2 MG/DL (ref 8.5–10.1)
CHLORIDE: 104 MEQ/L (ref 98–107)
CREATININE: 0.82 MG/DL (ref 0.5–1)
EOSINOPHIL #: 0.2 TH/MM3 (ref 0–0.4)
EOSINOPHIL %: 4 % (ref 0–4)
GLOMERULAR FILTRATION RATE: 83 ML/MIN (ref 89–?)
GLUCOSE,RANDOM: 111 MG/DL (ref 74–106)
HEMATOCRIT: 22.5 % (ref 35–46)
HEMO FLAGS: (no result)
HEMOGLOBIN: 7.3 GM/DL (ref 11.6–15.3)
LYMPH %: 15.1 % (ref 9–44)
LYMPHOCYTE #: 0.6 TH/MM3 (ref 1–4.8)
MEAN CELL VOLUME: 81.7 FL (ref 80–100)
MEAN CORPUSCULAR HEMOGLOBIN: 26.4 PG (ref 27–34)
MEAN CORPUSCULAR HGB CONC: 32.3 % (ref 32–36)
MEAN PLATELET VOLUME: 7 FL (ref 7–11)
MONO %: 9.2 % (ref 0–8)
MONOCYTE #: 0.4 TH/MM3 (ref 0–0.9)
NEUT %: 71.4 % (ref 16–70)
PHOSPHORUS: 1.9 MG/DL (ref 2.5–4.9)
PLAT MORPH BLD: NORMAL
PLATELET COUNT: 231 TH/MM3 (ref 150–450)
PLATELET ESTIMATE SMEAR: NORMAL
POTASSIUM: 4.6 MEQ/L (ref 3.5–5.1)
RED BLOOD COUNT: 2.76 MIL/MM3 (ref 4–5.3)
RED CELL DISTRIBUTION WIDTH: 21.1 % (ref 11.6–17.2)
SCAN/DIFF: (no result)
SODIUM (NA): 140 MEQ/L (ref 136–145)
WHITE BLOOD COUNT: 3.9 TH/MM3 (ref 4–11)

## 2018-05-11 RX ADMIN — MEGESTROL ACETATE 1 MG: 40 SUSPENSION ORAL at 09:00

## 2018-05-11 RX ADMIN — POTASSIUM BICARBONATE 1 MEQ: 25 TABLET, EFFERVESCENT ORAL at 11:01

## 2018-05-11 RX ADMIN — STANDARDIZED SENNA CONCENTRATE AND DOCUSATE SODIUM 1 TAB: 8.6; 5 TABLET, FILM COATED ORAL at 21:26

## 2018-05-11 RX ADMIN — Medication 1 ML: at 21:26

## 2018-05-11 RX ADMIN — HYDROCODONE BITARTRATE AND ACETAMINOPHEN 1 TAB: 10; 325 TABLET ORAL at 10:56

## 2018-05-11 RX ADMIN — POTASSIUM PHOSPHATE, MONOBASIC 1 MG: 500 TABLET, SOLUBLE ORAL at 21:26

## 2018-05-11 RX ADMIN — HYDROCODONE BITARTRATE AND ACETAMINOPHEN 1 TAB: 10; 325 TABLET ORAL at 16:27

## 2018-05-11 RX ADMIN — Medication 1 ML: at 10:57

## 2018-05-11 RX ADMIN — FLUCONAZOLE 1 MG: 100 TABLET ORAL at 10:57

## 2018-05-11 RX ADMIN — STANDARDIZED SENNA CONCENTRATE AND DOCUSATE SODIUM 1 TAB: 8.6; 5 TABLET, FILM COATED ORAL at 10:56

## 2018-05-12 LAB
ALBUMIN: 1.9 GM/DL (ref 3.4–5)
ANION GAP: 8 MEQ/L (ref 5–15)
AUTOMATED NEUTROPHIL #: 3 TH/MM3 (ref 1.8–7.7)
BASOPHIL #: 0 TH/MM3 (ref 0–0.2)
BASOPHIL %: 0.3 % (ref 0–2)
BICARBONATE: 27.3 MEQ/L (ref 21–32)
BLOOD UREA NITROGEN: 18 MG/DL (ref 7–18)
CALCIUM: 9.8 MG/DL (ref 8.5–10.1)
CHLORIDE: 105 MEQ/L (ref 98–107)
CREATININE: 0.83 MG/DL (ref 0.5–1)
EOSINOPHIL #: 0.2 TH/MM3 (ref 0–0.4)
EOSINOPHIL %: 4.1 % (ref 0–4)
GLOMERULAR FILTRATION RATE: 82 ML/MIN (ref 89–?)
GLUCOSE,RANDOM: 134 MG/DL (ref 74–106)
HEMATOCRIT: 23.7 % (ref 35–46)
HEMO FLAGS: (no result)
HEMOGLOBIN: 7.7 GM/DL (ref 11.6–15.3)
LYMPH %: 13.6 % (ref 9–44)
LYMPHOCYTE #: 0.5 TH/MM3 (ref 1–4.8)
MAGNESIUM: 2.2 MG/DL (ref 1.5–2.5)
MEAN CELL VOLUME: 82.6 FL (ref 80–100)
MEAN CORPUSCULAR HEMOGLOBIN: 26.9 PG (ref 27–34)
MEAN CORPUSCULAR HGB CONC: 32.6 % (ref 32–36)
MEAN PLATELET VOLUME: 7.3 FL (ref 7–11)
MONO %: 7.4 % (ref 0–8)
MONOCYTE #: 0.3 TH/MM3 (ref 0–0.9)
NEUT %: 74.6 % (ref 16–70)
PHOSPHORUS: 1.8 MG/DL (ref 2.5–4.9)
PLATELET COUNT: 226 TH/MM3 (ref 150–450)
POTASSIUM: 4.1 MEQ/L (ref 3.5–5.1)
RBC COMMENT: 1 1
RED BLOOD COUNT: 2.86 MIL/MM3 (ref 4–5.3)
RED CELL DISTRIBUTION WIDTH: 21.6 % (ref 11.6–17.2)
SODIUM (NA): 140 MEQ/L (ref 136–145)
WHITE BLOOD COUNT: 4 TH/MM3 (ref 4–11)

## 2018-05-12 RX ADMIN — FLUCONAZOLE 1 MG: 100 TABLET ORAL at 09:37

## 2018-05-12 RX ADMIN — HYDROCODONE BITARTRATE AND ACETAMINOPHEN 1 TAB: 10; 325 TABLET ORAL at 09:36

## 2018-05-12 RX ADMIN — MEGESTROL ACETATE 1 MG: 40 SUSPENSION ORAL at 09:36

## 2018-05-12 RX ADMIN — Medication 1 ML: at 09:40

## 2018-05-12 RX ADMIN — STANDARDIZED SENNA CONCENTRATE AND DOCUSATE SODIUM 1 TAB: 8.6; 5 TABLET, FILM COATED ORAL at 20:11

## 2018-05-12 RX ADMIN — POTASSIUM BICARBONATE 1 MEQ: 25 TABLET, EFFERVESCENT ORAL at 09:37

## 2018-05-12 RX ADMIN — NICARDIPINE HYDROCHLORIDE 1 MLS/HR: 2.5 INJECTION INTRAVENOUS at 10:30

## 2018-05-12 RX ADMIN — SODIUM PHOSPHATE, MONOBASIC, MONOHYDRATE 1 MLS/HR: 276; 142 INJECTION, SOLUTION INTRAVENOUS at 10:41

## 2018-05-12 RX ADMIN — HYDROCODONE BITARTRATE AND ACETAMINOPHEN 1 TAB: 10; 325 TABLET ORAL at 20:10

## 2018-05-12 RX ADMIN — POTASSIUM PHOSPHATE, MONOBASIC 1 MG: 500 TABLET, SOLUBLE ORAL at 10:41

## 2018-05-12 RX ADMIN — Medication 1 ML: at 20:11

## 2018-05-12 RX ADMIN — STANDARDIZED SENNA CONCENTRATE AND DOCUSATE SODIUM 1 TAB: 8.6; 5 TABLET, FILM COATED ORAL at 09:37

## 2018-05-13 LAB
AUTOMATED NEUTROPHIL #: 4 TH/MM3 (ref 1.8–7.7)
BASOPHIL #: 0 TH/MM3 (ref 0–0.2)
BASOPHIL %: 0.2 % (ref 0–2)
EOSINOPHIL #: 0.2 TH/MM3 (ref 0–0.4)
EOSINOPHIL %: 3.1 % (ref 0–4)
HEMATOCRIT: 28.1 % (ref 35–46)
HEMO FLAGS: (no result)
HEMOGLOBIN: 9.1 GM/DL (ref 11.6–15.3)
LYMPH %: 10.2 % (ref 9–44)
LYMPHOCYTE #: 0.5 TH/MM3 (ref 1–4.8)
MEAN CELL VOLUME: 83.1 FL (ref 80–100)
MEAN CORPUSCULAR HEMOGLOBIN: 27.1 PG (ref 27–34)
MEAN CORPUSCULAR HGB CONC: 32.6 % (ref 32–36)
MEAN PLATELET VOLUME: 7.3 FL (ref 7–11)
MONO %: 7 % (ref 0–8)
MONOCYTE #: 0.4 TH/MM3 (ref 0–0.9)
NEUT %: 79.5 % (ref 16–70)
PLATELET COUNT: 237 TH/MM3 (ref 150–450)
RED BLOOD COUNT: 3.37 MIL/MM3 (ref 4–5.3)
RED CELL DISTRIBUTION WIDTH: 20 % (ref 11.6–17.2)
WHITE BLOOD COUNT: 5.1 TH/MM3 (ref 4–11)

## 2018-05-13 RX ADMIN — POTASSIUM PHOSPHATE, MONOBASIC 1 MG: 500 TABLET, SOLUBLE ORAL at 11:18

## 2018-05-13 RX ADMIN — Medication 1 ML: at 08:49

## 2018-05-13 RX ADMIN — STANDARDIZED SENNA CONCENTRATE AND DOCUSATE SODIUM 1 TAB: 8.6; 5 TABLET, FILM COATED ORAL at 08:49

## 2018-05-13 RX ADMIN — POTASSIUM BICARBONATE 1 MEQ: 25 TABLET, EFFERVESCENT ORAL at 08:49

## 2018-05-13 RX ADMIN — MEGESTROL ACETATE 1 MG: 40 SUSPENSION ORAL at 08:49

## 2018-05-13 RX ADMIN — HEPARIN SODIUM (PORCINE) LOCK FLUSH IV SOLN 100 UNIT/ML 1 UNITS: 100 SOLUTION at 14:57

## 2018-05-13 RX ADMIN — FLUCONAZOLE 1 MG: 100 TABLET ORAL at 08:49
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
